# Patient Record
Sex: FEMALE | Race: WHITE | NOT HISPANIC OR LATINO | Employment: OTHER | URBAN - METROPOLITAN AREA
[De-identification: names, ages, dates, MRNs, and addresses within clinical notes are randomized per-mention and may not be internally consistent; named-entity substitution may affect disease eponyms.]

---

## 2017-01-10 ENCOUNTER — TELEPHONE (OUTPATIENT)
Dept: INTERNAL MEDICINE | Facility: CLINIC | Age: 57
End: 2017-01-10

## 2017-01-10 NOTE — TELEPHONE ENCOUNTER
----- Message from Mee Sidhu sent at 1/10/2017 10:28 AM EST -----  Patient called to ask for an order to check her thyroid, please. Thank you!

## 2017-01-10 NOTE — TELEPHONE ENCOUNTER
Called to see why she needed Thyroid checked again just checked on 12.20.16.      Her endo doctor wants her to have:  Free T3  Reverse T3  Thyroid Antibody tests  T3U  Free T4 Index?  TPO and anti TG antibodies      Weight gain, hair lose etc.

## 2017-01-12 DIAGNOSIS — R63.5 WEIGHT GAIN: ICD-10-CM

## 2017-01-12 DIAGNOSIS — L65.9 HAIR LOSS: Primary | ICD-10-CM

## 2017-01-13 ENCOUNTER — LAB (OUTPATIENT)
Dept: INTERNAL MEDICINE | Facility: CLINIC | Age: 57
End: 2017-01-13

## 2017-01-13 DIAGNOSIS — R77.8 ELEVATED TOTAL PROTEIN: ICD-10-CM

## 2017-01-13 DIAGNOSIS — L65.9 HAIR LOSS: ICD-10-CM

## 2017-01-13 DIAGNOSIS — E83.52 HYPERCALCEMIA: ICD-10-CM

## 2017-01-13 DIAGNOSIS — D72.829 LEUKOCYTOSIS, UNSPECIFIED TYPE: ICD-10-CM

## 2017-01-13 DIAGNOSIS — R63.5 WEIGHT GAIN: ICD-10-CM

## 2017-01-13 LAB
BASOPHILS # BLD AUTO: 0.03 10*3/MM3 (ref 0–0.2)
BASOPHILS NFR BLD AUTO: 0.2 % (ref 0–1)
CA-I SERPL ISE-MCNC: 1.34 MMOL/L (ref 1.12–1.32)
DEPRECATED RDW RBC AUTO: 45.6 FL (ref 37–54)
EOSINOPHIL # BLD AUTO: 0.85 10*3/MM3 (ref 0.1–0.3)
EOSINOPHIL NFR BLD AUTO: 6.7 % (ref 0–3)
ERYTHROCYTE [DISTWIDTH] IN BLOOD BY AUTOMATED COUNT: 13.7 % (ref 11.3–14.5)
HCT VFR BLD AUTO: 39.5 % (ref 34.5–44)
HGB BLD-MCNC: 13 G/DL (ref 11.5–15.5)
IMM GRANULOCYTES # BLD: 0.02 10*3/MM3 (ref 0–0.03)
IMM GRANULOCYTES NFR BLD: 0.2 % (ref 0–0.6)
LYMPHOCYTES # BLD AUTO: 4.04 10*3/MM3 (ref 0.6–4.8)
LYMPHOCYTES NFR BLD AUTO: 32 % (ref 24–44)
MCH RBC QN AUTO: 29.8 PG (ref 27–31)
MCHC RBC AUTO-ENTMCNC: 32.9 G/DL (ref 32–36)
MCV RBC AUTO: 90.6 FL (ref 80–99)
MONOCYTES # BLD AUTO: 1.87 10*3/MM3 (ref 0–1)
MONOCYTES NFR BLD AUTO: 14.8 % (ref 0–12)
NEUTROPHILS # BLD AUTO: 5.83 10*3/MM3 (ref 1.5–8.3)
NEUTROPHILS NFR BLD AUTO: 46.1 % (ref 41–71)
PLATELET # BLD AUTO: 517 10*3/MM3 (ref 150–450)
PMV BLD AUTO: 11.4 FL (ref 6–12)
RBC # BLD AUTO: 4.36 10*6/MM3 (ref 3.89–5.14)
T4 FREE SERPL-MCNC: 1.01 NG/DL (ref 0.89–1.76)
TSH SERPL DL<=0.05 MIU/L-ACNC: 2.25 MIU/ML (ref 0.35–5.35)
WBC NRBC COR # BLD: 12.64 10*3/MM3 (ref 3.5–10.8)

## 2017-01-13 PROCEDURE — 86376 MICROSOMAL ANTIBODY EACH: CPT | Performed by: FAMILY MEDICINE

## 2017-01-13 PROCEDURE — 84439 ASSAY OF FREE THYROXINE: CPT | Performed by: FAMILY MEDICINE

## 2017-01-13 PROCEDURE — 85025 COMPLETE CBC W/AUTO DIFF WBC: CPT | Performed by: FAMILY MEDICINE

## 2017-01-13 PROCEDURE — 84432 ASSAY OF THYROGLOBULIN: CPT | Performed by: FAMILY MEDICINE

## 2017-01-13 PROCEDURE — 84155 ASSAY OF PROTEIN SERUM: CPT | Performed by: FAMILY MEDICINE

## 2017-01-13 PROCEDURE — 84479 ASSAY OF THYROID (T3 OR T4): CPT | Performed by: FAMILY MEDICINE

## 2017-01-13 PROCEDURE — 84165 PROTEIN E-PHORESIS SERUM: CPT | Performed by: FAMILY MEDICINE

## 2017-01-13 PROCEDURE — 83970 ASSAY OF PARATHORMONE: CPT | Performed by: FAMILY MEDICINE

## 2017-01-13 PROCEDURE — 84443 ASSAY THYROID STIM HORMONE: CPT | Performed by: FAMILY MEDICINE

## 2017-01-13 PROCEDURE — 84481 FREE ASSAY (FT-3): CPT | Performed by: FAMILY MEDICINE

## 2017-01-13 PROCEDURE — 36415 COLL VENOUS BLD VENIPUNCTURE: CPT

## 2017-01-13 PROCEDURE — 84482 T3 REVERSE: CPT | Performed by: FAMILY MEDICINE

## 2017-01-13 PROCEDURE — 82330 ASSAY OF CALCIUM: CPT | Performed by: FAMILY MEDICINE

## 2017-01-14 LAB
FT4I SERPL CALC-MCNC: 2.1 (ref 1.2–4.9)
PTH-INTACT SERPL-MCNC: 36 PG/ML (ref 15–65)
T3FREE SERPL-MCNC: 2.8 PG/ML (ref 2–4.4)
T3RU NFR SERPL: 30 % (ref 24–39)
T4 SERPL-MCNC: 7 UG/DL (ref 4.5–12)
THYROPEROXIDASE AB SERPL-ACNC: 23 IU/ML (ref 0–34)

## 2017-01-16 LAB
ALBUMIN SERPL-MCNC: 3.9 G/DL (ref 2.9–4.4)
ALBUMIN/GLOB SERPL: 1 {RATIO} (ref 0.7–1.7)
ALPHA1 GLOB FLD ELPH-MCNC: 0.3 G/DL (ref 0–0.4)
ALPHA2 GLOB SERPL ELPH-MCNC: 1 G/DL (ref 0.4–1)
B-GLOBULIN SERPL ELPH-MCNC: 1.3 G/DL (ref 0.7–1.3)
GAMMA GLOB SERPL ELPH-MCNC: 1.2 G/DL (ref 0.4–1.8)
GLOBULIN SER CALC-MCNC: 3.8 G/DL (ref 2.2–3.9)
Lab: NORMAL
M-SPIKE: NORMAL G/DL
PROT SERPL-MCNC: 7.7 G/DL (ref 6–8.5)

## 2017-01-17 LAB — T3REVERSE SERPL-MCNC: 10.9 NG/DL (ref 9.2–24.1)

## 2017-01-18 ENCOUNTER — TELEPHONE (OUTPATIENT)
Dept: INTERNAL MEDICINE | Facility: CLINIC | Age: 57
End: 2017-01-18

## 2017-01-18 NOTE — TELEPHONE ENCOUNTER
----- Message from Mee Sidhu sent at 1/18/2017  3:09 PM EST -----  Patient called stating that the symptoms from her bladder infection have not improved. Please advise. Thank you!

## 2017-01-20 ENCOUNTER — OFFICE VISIT (OUTPATIENT)
Dept: INTERNAL MEDICINE | Facility: CLINIC | Age: 57
End: 2017-01-20

## 2017-01-20 VITALS
WEIGHT: 141.8 LBS | SYSTOLIC BLOOD PRESSURE: 110 MMHG | DIASTOLIC BLOOD PRESSURE: 70 MMHG | TEMPERATURE: 98 F | OXYGEN SATURATION: 94 % | BODY MASS INDEX: 23.63 KG/M2 | HEART RATE: 81 BPM | HEIGHT: 65 IN

## 2017-01-20 DIAGNOSIS — L65.9 HAIR LOSS: ICD-10-CM

## 2017-01-20 DIAGNOSIS — R82.90 ABNORMAL URINALYSIS: ICD-10-CM

## 2017-01-20 DIAGNOSIS — R82.90 ABNORMAL URINE ODOR: Primary | ICD-10-CM

## 2017-01-20 LAB
BILIRUB BLD-MCNC: NEGATIVE MG/DL
CLARITY, POC: CLEAR
COLOR UR: YELLOW
GLUCOSE UR STRIP-MCNC: NEGATIVE MG/DL
KETONES UR QL: NEGATIVE
LEUKOCYTE EST, POC: ABNORMAL
NITRITE UR-MCNC: NEGATIVE MG/ML
PH UR: 6 [PH] (ref 5–8)
PROT UR STRIP-MCNC: NEGATIVE MG/DL
RBC # UR STRIP: NEGATIVE /UL
SP GR UR: 1.02 (ref 1–1.03)
UROBILINOGEN UR QL: ABNORMAL

## 2017-01-20 PROCEDURE — 87077 CULTURE AEROBIC IDENTIFY: CPT | Performed by: FAMILY MEDICINE

## 2017-01-20 PROCEDURE — 87186 SC STD MICRODIL/AGAR DIL: CPT | Performed by: FAMILY MEDICINE

## 2017-01-20 PROCEDURE — 87086 URINE CULTURE/COLONY COUNT: CPT | Performed by: FAMILY MEDICINE

## 2017-01-20 PROCEDURE — 99213 OFFICE O/P EST LOW 20 MIN: CPT | Performed by: FAMILY MEDICINE

## 2017-01-20 PROCEDURE — 81003 URINALYSIS AUTO W/O SCOPE: CPT | Performed by: FAMILY MEDICINE

## 2017-01-20 RX ORDER — CEFUROXIME AXETIL 500 MG/1
500 TABLET ORAL 2 TIMES DAILY
Qty: 20 TABLET | Refills: 0 | Status: SHIPPED | OUTPATIENT
Start: 2017-01-20 | End: 2017-05-11

## 2017-01-20 NOTE — PROGRESS NOTES
"Leni Laurent is a 56 y.o. female.     Chief Complaint   Patient presents with   • Cystitis     1 month follow up on bladder infection and thyroid       Vitals:    01/20/17 1634   BP: 110/70   Pulse: 81   Temp: 98 °F (36.7 °C)   SpO2: 94%   Weight: 141 lb 12.8 oz (64.3 kg)   Height: 65\" (165.1 cm)       Cystitis   This is a recurrent problem. The current episode started 1 to 4 weeks ago. The problem occurs intermittently. The problem has been unchanged. Associated symptoms include abdominal pain, diaphoresis, fatigue, headaches, nausea and vomiting. Pertinent negatives include no arthralgias, chest pain, chills, coughing, fever, myalgias, neck pain, rash or sore throat.      Bladder infection has recurred. Pt has an odor, but no pain.   Pt has had weight gain and fatigue with brittle nails and hair loss.  Preliminary thyroid lab ok, will check Celiac.   The following portions of the patient's history were reviewed and updated as appropriate: allergies, current medications, past family history, past medical history, past social history, past surgical history and problem list.     Past Medical History   Diagnosis Date   • Abdominal pain    • Absence of pancreas, acquired    • Anxiety    • Asthma    • Chronic illness    • Contact dermatitis      due to plants   • Depression    • Diabetes mellitus    • Encounter for dental examination 07/2014   • Gastroparesis    • GERD (gastroesophageal reflux disease)    • Hyperlipidemia    • Incisional hernia    • Insomnia    • Iron deficiency    • Myeloid leukemia    • Osteopenia    • Post-splenectomy    • Vitamin D deficiency        Past Surgical History   Procedure Laterality Date   • Colonoscopy     • Total abdominal hysterectomy with salpingo oophorectomy     • Pancreas surgery       pancreas removal   • Gallbladder surgery     • Stomach surgery       distal stomach    • Other surgical history       duodeum removed   • Other surgical history       jejunem removed "   • Cervical discectomy     • Hernia mesh removal  2014     abdominal hernia with mesh        Allergies   Allergen Reactions   • Coconut Oil      Difficulty speaking, swallowing, hives     • Aspirin Hives   • Phenergan [Promethazine Hcl]      Anxiety/relessness   • Reglan [Metoclopramide]      Involuntary movement       Social History     Social History   • Marital status:      Spouse name: N/A   • Number of children: N/A   • Years of education: N/A     Occupational History   • Not on file.     Social History Main Topics   • Smoking status: Never Smoker   • Smokeless tobacco: Never Used   • Alcohol use No   • Drug use: No   • Sexual activity: Yes     Birth control/ protection: None     Other Topics Concern   • Not on file     Social History Narrative       Family History   Problem Relation Age of Onset   • Osteoporosis Mother    • Cancer Mother      small bowel cancer   • Hashimoto's thyroiditis Mother    • Lung cancer Father          Review of Systems   Constitutional: Positive for diaphoresis and fatigue. Negative for chills and fever.   HENT: Negative for ear pain, hearing loss, mouth sores, nosebleeds, postnasal drip, rhinorrhea, sinus pressure, sneezing and sore throat.    Eyes: Negative for redness and itching.   Respiratory: Negative for cough, shortness of breath and wheezing.    Cardiovascular: Positive for palpitations. Negative for chest pain.   Gastrointestinal: Positive for abdominal pain, constipation, diarrhea, nausea and vomiting. Negative for anal bleeding and blood in stool.        Hx pancreas issues   Endocrine: Positive for heat intolerance. Negative for cold intolerance, polydipsia, polyphagia and polyuria.   Genitourinary: Positive for frequency and hematuria. Negative for dysuria and urgency.   Musculoskeletal: Negative for arthralgias, back pain, gait problem, myalgias and neck pain.   Skin: Negative for color change and rash.        Dry skin, and brittle nails   Allergic/Immunologic:  Negative for environmental allergies.   Neurological: Positive for headaches. Negative for dizziness, seizures and syncope.   Hematological: Negative for adenopathy. Does not bruise/bleed easily.   Psychiatric/Behavioral: Negative for dysphoric mood. The patient is not nervous/anxious.        Objective   Physical Exam   Constitutional: She is oriented to person, place, and time. She appears well-developed.   HENT:   Head: Normocephalic.   Right Ear: External ear normal.   Left Ear: External ear normal.   Nose: Nose normal.   Mouth/Throat: Oropharynx is clear and moist.   Eyes: Conjunctivae and EOM are normal. Pupils are equal, round, and reactive to light.   Neck: Normal range of motion. Neck supple.   Cardiovascular: Normal rate and regular rhythm.    No murmur heard.  Pulmonary/Chest: Effort normal and breath sounds normal.   Abdominal: Soft. Bowel sounds are normal. There is tenderness. There is no rebound and no guarding.   Musculoskeletal: Normal range of motion.   Neurological: She is alert and oriented to person, place, and time.   Skin: Skin is warm and dry.   Psychiatric: She has a normal mood and affect. Her behavior is normal.   Nursing note and vitals reviewed.      Assessment/Plan   Renée was seen today for cystitis.    Diagnoses and all orders for this visit:    Abnormal urine odor  -     POC Urinalysis Dipstick, Automated    Abnormal urinalysis  -     Urine Culture    Hair loss  -     Celiac Disease Panel    Other orders  -     cefuroxime (CEFTIN) 500 MG tablet; Take 1 tablet by mouth 2 (Two) Times a Day.      Reviewed available lab with pt and wrote quick lab letter in her presence to be able to give her a copy of most recent lab.              Current Outpatient Prescriptions:   •  desvenlafaxine (PRISTIQ) 100 MG 24 hr tablet, Take 1 tablet by mouth Daily., Disp: 30 tablet, Rfl: 5  •  esomeprazole (nexIUM) 40 MG capsule, Take 1 capsule by mouth Every Morning Before Breakfast., Disp: 30 capsule, Rfl:  5  •  glucagon (GLUCAGON EMERGENCY) 1 MG injection, Inject 1 mg under the skin 1 (one) time as needed for low blood sugar for up to 1 dose., Disp: 1 kit, Rfl: 12  •  glucose blood test strip, Used to test blood sugar 4 times daily for diabetes. E10.9, Disp: 150 each, Rfl: 12  •  Insulin Glargine (LANTUS SOLOSTAR) 100 UNIT/ML injection pen, Inject 10 Units under the skin Every Night. For diabetes E10.9, Disp: 5 pen, Rfl: 1  •  Insulin Lispro (HUMALOG KWIKPEN) 100 UNIT/ML solution pen-injector, 3 times per day, sliding scale. 1 unit for every 30 points over 175 for diabetes. E10.9, Disp: 10 pen, Rfl: 1  •  magnesium oxide (MAGOX) 400 (241.3 MG) MG tablet tablet, Take 400 mg by mouth daily., Disp: , Rfl:   •  Multiple Vitamins-Minerals (AQUADEKS PO), Take  by mouth., Disp: , Rfl:   •  ondansetron (ZOFRAN) 4 MG tablet, Take 1 tablet by mouth Every 8 (Eight) Hours As Needed for nausea or vomiting., Disp: 30 tablet, Rfl: 5  •  pancrelipase, Lip-Prot-Amyl, (CREON) 40574 UNITS capsule delayed-release particles capsule, Take 3 tablets tid with meals and 1-2 with snacks, Disp: 360 capsule, Rfl: 5  •  vitamin D (ERGOCALCIFEROL) 51537 UNITS capsule capsule, Take 1 capsule by mouth Every 7 (Seven) Days., Disp: 4 capsule, Rfl: 4  •  zolpidem (AMBIEN) 10 MG tablet, Take 1 tablet by mouth At Night As Needed for sleep., Disp: 30 tablet, Rfl: 2  •  cefuroxime (CEFTIN) 500 MG tablet, Take 1 tablet by mouth 2 (Two) Times a Day., Disp: 20 tablet, Rfl: 0  •  HYDROcodone-acetaminophen (NORCO) 5-325 MG per tablet, Take 1 tablet by mouth Every 6 (Six) Hours As Needed for moderate pain (4-6)., Disp: 30 tablet, Rfl: 0  •  Multiple Vitamins-Minerals (MULTIVITAMIN ADULT PO), Take  by mouth., Disp: , Rfl:     Return in about 3 months (around 4/20/2017), or if symptoms worsen or fail to improve, for Recheck.    Recent Results (from the past 336 hour(s))   PTH, Intact    Collection Time: 01/13/17  2:08 PM   Result Value Ref Range    PTH, Intact 36  15 - 65 pg/mL   Calcium, Ionized    Collection Time: 01/13/17  2:08 PM   Result Value Ref Range    Ionized Calcium 1.34 (H) 1.12 - 1.32 mmol/L   Protein Electrophoresis, Total    Collection Time: 01/13/17  2:08 PM   Result Value Ref Range    Total Protein 7.7 6.0 - 8.5 g/dL    Albumin 3.9 2.9 - 4.4 g/dL    Alpha-1-Globulin 0.3 0.0 - 0.4 g/dL    Alpha-2-Globulin 1.0 0.4 - 1.0 g/dL    Beta Globulin 1.3 0.7 - 1.3 g/dL    Gamma Globulin 1.2 0.4 - 1.8 g/dL    M-Clive Not Observed Not Observed g/dL    Globulin 3.8 2.2 - 3.9 g/dL    A/G Ratio 1.0 0.7 - 1.7    Please note Comment    TSH    Collection Time: 01/13/17  2:08 PM   Result Value Ref Range    TSH 2.247 0.350 - 5.350 mIU/mL   T4, Free    Collection Time: 01/13/17  2:08 PM   Result Value Ref Range    Free T4 1.01 0.89 - 1.76 ng/dL   T3, Free    Collection Time: 01/13/17  2:08 PM   Result Value Ref Range    T3, Free 2.8 2.0 - 4.4 pg/mL   T3, Reverse    Collection Time: 01/13/17  2:08 PM   Result Value Ref Range    T3, Reverse 10.9 9.2 - 24.1 ng/dL   Thyroid Peroxidase Antibody    Collection Time: 01/13/17  2:08 PM   Result Value Ref Range    Thyroid Peroxidase Antibody 23 0 - 34 IU/mL   T3 Uptake & FTI    Collection Time: 01/13/17  2:08 PM   Result Value Ref Range    T4, Total 7.0 4.5 - 12.0 ug/dL    T3 Uptake 30 24 - 39 %    Free Thyroxine Index 2.1 1.2 - 4.9   CBC Auto Differential    Collection Time: 01/13/17  2:08 PM   Result Value Ref Range    WBC 12.64 (H) 3.50 - 10.80 10*3/mm3    RBC 4.36 3.89 - 5.14 10*6/mm3    Hemoglobin 13.0 11.5 - 15.5 g/dL    Hematocrit 39.5 34.5 - 44.0 %    MCV 90.6 80.0 - 99.0 fL    MCH 29.8 27.0 - 31.0 pg    MCHC 32.9 32.0 - 36.0 g/dL    RDW 13.7 11.3 - 14.5 %    RDW-SD 45.6 37.0 - 54.0 fl    MPV 11.4 6.0 - 12.0 fL    Platelets 517 (H) 150 - 450 10*3/mm3    Neutrophil % 46.1 41.0 - 71.0 %    Lymphocyte % 32.0 24.0 - 44.0 %    Monocyte % 14.8 (H) 0.0 - 12.0 %    Eosinophil % 6.7 (H) 0.0 - 3.0 %    Basophil % 0.2 0.0 - 1.0 %    Immature  Grans % 0.2 0.0 - 0.6 %    Neutrophils, Absolute 5.83 1.50 - 8.30 10*3/mm3    Lymphocytes, Absolute 4.04 0.60 - 4.80 10*3/mm3    Monocytes, Absolute 1.87 (H) 0.00 - 1.00 10*3/mm3    Eosinophils, Absolute 0.85 (H) 0.10 - 0.30 10*3/mm3    Basophils, Absolute 0.03 0.00 - 0.20 10*3/mm3    Immature Grans, Absolute 0.02 0.00 - 0.03 10*3/mm3   POC Urinalysis Dipstick, Automated    Collection Time: 01/20/17  5:24 PM   Result Value Ref Range    Color Yellow Yellow, Straw, Dark Yellow, Denice    Clarity, UA Clear Clear    Glucose, UA Negative Negative, 1000 mg/dL (3+) mg/dL    Bilirubin Negative Negative    Ketones, UA Negative Negative    Specific Gravity  1.025 1.005 - 1.030    Blood, UA Negative Negative    pH, Urine 6.0 5.0 - 8.0    Protein, POC Negative Negative mg/dL    Urobilinogen, UA  Normal    Leukocytes Moderate (2+) (A) Negative    Nitrite, UA Negative Negative

## 2017-01-20 NOTE — MR AVS SNAPSHOT
Renée Laurent   1/20/2017 3:30 PM   Office Visit    Dept Phone:  [phone]   Encounter #:  [id number]    Provider:  Karla EDDY MD   Department:  [facility] INTERNAL MEDICINE                Your Full Care Plan              Today's Medication Changes          These changes are accurate as of: 1/20/17  5:48 PM.  If you have any questions, ask your nurse or doctor.               New Medication(s)Ordered:     cefuroxime 500 MG tablet   Commonly known as:  CEFTIN   Take 1 tablet by mouth 2 (Two) Times a Day.   Started by:  Karla EDDY MD         Stop taking medication(s)listed here:     nitrofurantoin (macrocrystal-monohydrate) 100 MG capsule   Commonly known as:  MACROBID   Stopped by:  Karla EDDY MD                Where to Get Your Medications      These medications were sent to [address] [place], [place] - [address] - [phone]  - [phone] FX  [address] [facility], [place] [address]     Phone:  [phone]     cefuroxime 500 MG tablet                  Your Updated Medication List          This list is accurate as of: 1/20/17  5:48 PM.  Always use your most recent med list.                AQUADEKS PO       cefuroxime 500 MG tablet   Commonly known as:  CEFTIN   Take 1 tablet by mouth 2 (Two) Times a Day.       desvenlafaxine 100 MG 24 hr tablet   Commonly known as:  PRISTIQ   Take 1 tablet by mouth Daily.       esomeprazole 40 MG capsule   Commonly known as:  nexIUM   Take 1 capsule by mouth Every Morning Before Breakfast.       glucagon 1 MG injection   Commonly known as:  GLUCAGON EMERGENCY   Inject 1 mg under the skin 1 (one) time as needed for low blood sugar for up to 1 dose.       glucose blood test strip   Used to test blood sugar 4 times daily for diabetes. E10.9       HYDROcodone-acetaminophen 5-325 MG per tablet   Commonly known as:  NORCO   Take 1 tablet by mouth Every 6 (Six) Hours As Needed for moderate pain  (4-6).       Insulin Glargine 100 UNIT/ML injection pen   Commonly known as:  LANTUS SOLOSTAR   Inject 10 Units under the skin Every Night. For diabetes E10.9       Insulin Lispro 100 UNIT/ML solution pen-injector   Commonly known as:  HUMALOG KWIKPEN   3 times per day, sliding scale. 1 unit for every 30 points over 175 for diabetes. E10.9       magnesium oxide 400 (241.3 MG) MG tablet tablet   Commonly known as:  MAGOX       MULTIVITAMIN ADULT PO       ondansetron 4 MG tablet   Commonly known as:  ZOFRAN   Take 1 tablet by mouth Every 8 (Eight) Hours As Needed for nausea or vomiting.       pancrelipase (Lip-Prot-Amyl) 96008 UNITS capsule delayed-release particles capsule   Commonly known as:  CREON   Take 3 tablets tid with meals and 1-2 with snacks       vitamin D 14554 UNITS capsule capsule   Commonly known as:  ERGOCALCIFEROL   Take 1 capsule by mouth Every 7 (Seven) Days.       zolpidem 10 MG tablet   Commonly known as:  AMBIEN   Take 1 tablet by mouth At Night As Needed for sleep.               We Performed the Following     Celiac Disease Panel     POC Urinalysis Dipstick, Automated     Urine Culture       You Were Diagnosed With        Codes Comments    Abnormal urine odor    -  Primary ICD-10-CM: R82.90  ICD-9-CM: 791.9     Abnormal urinalysis     ICD-10-CM: R82.90  ICD-9-CM: 791.9     Hair loss     ICD-10-CM: L65.9  ICD-9-CM: 704.00       Instructions     None    Patient Instructions History      Upcoming Appointments     Visit Type Date Time Department    FOLLOW UP 1/20/2017  3:30 PM [address]    FOLLOW UP 3/20/2017 12:30 PM [address]      MyChart Signup     Our records indicate that you have declined [facility] Schedule C Systemshart signup. If you would like to sign up for [employer], please email [email or web address] or call [phone] to obtain an activation code.             Other Info from Your Visit           Your Appointments     Mar 20, 2017 12:30 PM EDT   Follow Up with Karla  "Brian EDDY MD   [facility] INTERNAL MEDICINE (--)    [address]  [place] [id number] [phone]           Arrive 15 minutes prior to appointment.              Allergies     Coconut Oil      Difficulty speaking, swallowing, hives    Aspirin  Hives    Phenergan [Promethazine Hcl]      Anxiety/relessness    Reglan [Metoclopramide]      Involuntary movement      Reason for Visit     Cystitis 1 month follow up on bladder infection and thyroid      Vital Signs     Blood Pressure Pulse Temperature Height Weight Last Menstrual Period    110/70 81 98 °F (36.7 °C) 65\" (165.1 cm) 141 lb 12.8 oz (64.3 kg) (LMP Unknown)    Oxygen Saturation Body Mass Index Smoking Status             94% 23.6 kg/m2 Never Smoker         Problems and Diagnoses Noted     Abnormal urine odor    -  Primary    Abnormal urine findings        Hair loss          Results     POC Urinalysis Dipstick, Automated      Component Value Standard Range & Units    Color Yellow Yellow, Straw, Dark Yellow, Denice    Clarity, UA Clear Clear    Glucose, UA Negative Negative, 1000 mg/dL (3+) mg/dL    Bilirubin Negative Negative    Ketones, UA Negative Negative    Specific Gravity  1.025 1.005 - 1.030    Blood, UA Negative Negative    pH, Urine 6.0 5.0 - 8.0    Protein, POC Negative Negative mg/dL    Urobilinogen, UA  Normal    17 umol/L    Leukocytes Moderate (2+) Negative    125 Ray/uL    Nitrite, UA Negative Negative                    "

## 2017-01-22 LAB — BACTERIA SPEC AEROBE CULT: ABNORMAL

## 2017-01-23 PROCEDURE — 36415 COLL VENOUS BLD VENIPUNCTURE: CPT | Performed by: FAMILY MEDICINE

## 2017-01-23 PROCEDURE — 86255 FLUORESCENT ANTIBODY SCREEN: CPT | Performed by: FAMILY MEDICINE

## 2017-01-25 LAB
ENDOMYSIUM IGA SER QL: NEGATIVE
IGA SERPL-MCNC: 435 MG/DL (ref 87–352)
TTG IGA SER-ACNC: <2 U/ML (ref 0–3)

## 2017-01-30 LAB — THYROGLOBULIN (TG-RIA): 7.8 NG/ML

## 2017-03-22 RX ORDER — ZOLPIDEM TARTRATE 10 MG/1
10 TABLET ORAL NIGHTLY PRN
Qty: 30 TABLET | Refills: 0 | OUTPATIENT
Start: 2017-03-22 | End: 2017-05-11 | Stop reason: SDUPTHER

## 2017-03-23 ENCOUNTER — TELEPHONE (OUTPATIENT)
Dept: INTERNAL MEDICINE | Facility: CLINIC | Age: 57
End: 2017-03-23

## 2017-03-23 NOTE — TELEPHONE ENCOUNTER
----- Message from Karla EDDY MD sent at 3/22/2017  5:58 PM EDT -----  Ok #30 R0 on printer  ----- Message -----     From: Nuha Butt     Sent: 3/22/2017   5:00 PM       To: Karla EDDY MD    Last seen 1/20/17.  Last fill2/21/17.  Next appointment 3/30.  Mannie printed.  [facility] 12/22/16  ----- Message -----     From: Angely Baron     Sent: 3/22/2017   9:13 AM       To: Nuha Butt    PT CALLED NEEDS REFILL ZOLPIDIM 10 MG SENT TO [facility] [place]

## 2017-04-17 RX ORDER — ZOLPIDEM TARTRATE 10 MG/1
TABLET ORAL
Qty: 30 TABLET | Refills: 0 | OUTPATIENT
Start: 2017-04-17

## 2017-04-19 ENCOUNTER — TELEPHONE (OUTPATIENT)
Dept: INTERNAL MEDICINE | Facility: CLINIC | Age: 57
End: 2017-04-19

## 2017-04-19 RX ORDER — ZOLPIDEM TARTRATE 10 MG/1
TABLET ORAL
Qty: 30 TABLET | Refills: 0 | OUTPATIENT
Start: 2017-04-19

## 2017-04-19 NOTE — TELEPHONE ENCOUNTER
----- Message from Angely Baron sent at 4/18/2017  1:16 PM EDT -----  PT NEEDS REFILL ZOLPIDIM 10 SENT TO [facility] [place]

## 2017-04-19 NOTE — TELEPHONE ENCOUNTER
PN that she must be seen for 3 mon th follow up before we can approve.  She has an appointment on 5/4, I offered an earlier appointment, but she will be out of town. She will wait for her appointment

## 2017-05-11 ENCOUNTER — OFFICE VISIT (OUTPATIENT)
Dept: INTERNAL MEDICINE | Facility: CLINIC | Age: 57
End: 2017-05-11

## 2017-05-11 VITALS
TEMPERATURE: 97.5 F | WEIGHT: 141.4 LBS | HEIGHT: 65 IN | BODY MASS INDEX: 23.56 KG/M2 | HEART RATE: 78 BPM | OXYGEN SATURATION: 94 % | SYSTOLIC BLOOD PRESSURE: 110 MMHG | DIASTOLIC BLOOD PRESSURE: 72 MMHG

## 2017-05-11 DIAGNOSIS — K31.84 GASTROPARESIS: ICD-10-CM

## 2017-05-11 DIAGNOSIS — Z90.410 POST-PANCREATECTOMY DIABETES (HCC): Primary | ICD-10-CM

## 2017-05-11 DIAGNOSIS — K21.9 GASTROESOPHAGEAL REFLUX DISEASE WITHOUT ESOPHAGITIS: ICD-10-CM

## 2017-05-11 DIAGNOSIS — R53.83 OTHER FATIGUE: ICD-10-CM

## 2017-05-11 DIAGNOSIS — E89.1 POST-PANCREATECTOMY DIABETES (HCC): Primary | ICD-10-CM

## 2017-05-11 DIAGNOSIS — K91.2 POSTSURGICAL MALABSORPTION: ICD-10-CM

## 2017-05-11 DIAGNOSIS — E10.9 TYPE 1 DIABETES MELLITUS WITHOUT COMPLICATIONS (HCC): ICD-10-CM

## 2017-05-11 DIAGNOSIS — E13.9 POST-PANCREATECTOMY DIABETES (HCC): Primary | ICD-10-CM

## 2017-05-11 DIAGNOSIS — E78.2 MIXED HYPERLIPIDEMIA: ICD-10-CM

## 2017-05-11 LAB
ALBUMIN SERPL-MCNC: 4.9 G/DL (ref 3.2–4.8)
ALBUMIN/GLOB SERPL: 1.3 G/DL (ref 1.5–2.5)
ALP SERPL-CCNC: 183 U/L (ref 25–100)
ALT SERPL W P-5'-P-CCNC: 34 U/L (ref 7–40)
ANION GAP SERPL CALCULATED.3IONS-SCNC: 4 MMOL/L (ref 3–11)
ARTICHOKE IGE QN: 150 MG/DL (ref 0–130)
AST SERPL-CCNC: 33 U/L (ref 0–33)
BILIRUB SERPL-MCNC: 0.2 MG/DL (ref 0.3–1.2)
BUN BLD-MCNC: 15 MG/DL (ref 9–23)
BUN/CREAT SERPL: 21.4 (ref 7–25)
CALCIUM SPEC-SCNC: 10.6 MG/DL (ref 8.7–10.4)
CHLORIDE SERPL-SCNC: 99 MMOL/L (ref 99–109)
CHOLEST SERPL-MCNC: 259 MG/DL (ref 0–200)
CO2 SERPL-SCNC: 36 MMOL/L (ref 20–31)
CREAT BLD-MCNC: 0.7 MG/DL (ref 0.6–1.3)
GFR SERPL CREATININE-BSD FRML MDRD: 86 ML/MIN/1.73
GLOBULIN UR ELPH-MCNC: 3.8 GM/DL
GLUCOSE BLD-MCNC: 116 MG/DL (ref 70–100)
HBA1C MFR BLD: 6.3 % (ref 4.8–5.6)
HDLC SERPL-MCNC: 72 MG/DL (ref 40–60)
POTASSIUM BLD-SCNC: 4.6 MMOL/L (ref 3.5–5.5)
PROT SERPL-MCNC: 8.7 G/DL (ref 5.7–8.2)
SODIUM BLD-SCNC: 139 MMOL/L (ref 132–146)
TRIGL SERPL-MCNC: 273 MG/DL (ref 0–150)
TSH SERPL DL<=0.05 MIU/L-ACNC: 2.36 MIU/ML (ref 0.35–5.35)

## 2017-05-11 PROCEDURE — 80053 COMPREHEN METABOLIC PANEL: CPT | Performed by: FAMILY MEDICINE

## 2017-05-11 PROCEDURE — 83036 HEMOGLOBIN GLYCOSYLATED A1C: CPT | Performed by: FAMILY MEDICINE

## 2017-05-11 PROCEDURE — 80061 LIPID PANEL: CPT | Performed by: FAMILY MEDICINE

## 2017-05-11 PROCEDURE — 99214 OFFICE O/P EST MOD 30 MIN: CPT | Performed by: FAMILY MEDICINE

## 2017-05-11 PROCEDURE — 84443 ASSAY THYROID STIM HORMONE: CPT | Performed by: FAMILY MEDICINE

## 2017-05-11 RX ORDER — ZOLPIDEM TARTRATE 10 MG/1
10 TABLET ORAL NIGHTLY PRN
Qty: 30 TABLET | Refills: 2 | Status: SHIPPED | OUTPATIENT
Start: 2017-05-11 | End: 2017-08-28 | Stop reason: SDUPTHER

## 2017-05-11 RX ORDER — ONDANSETRON 4 MG/1
4 TABLET, FILM COATED ORAL EVERY 8 HOURS PRN
Qty: 60 TABLET | Refills: 5 | Status: SHIPPED | OUTPATIENT
Start: 2017-05-11 | End: 2017-10-27 | Stop reason: SDUPTHER

## 2017-05-14 DIAGNOSIS — E55.9 VITAMIN D DEFICIENCY: ICD-10-CM

## 2017-05-15 RX ORDER — ERGOCALCIFEROL 1.25 MG/1
CAPSULE ORAL
Qty: 4 CAPSULE | Refills: 0 | Status: SHIPPED | OUTPATIENT
Start: 2017-05-15 | End: 2017-06-15 | Stop reason: SDUPTHER

## 2017-06-15 DIAGNOSIS — E55.9 VITAMIN D DEFICIENCY: ICD-10-CM

## 2017-06-15 RX ORDER — ERGOCALCIFEROL 1.25 MG/1
CAPSULE ORAL
Qty: 4 CAPSULE | Refills: 0 | Status: SHIPPED | OUTPATIENT
Start: 2017-06-15 | End: 2023-01-16

## 2017-06-17 DIAGNOSIS — F32.A DEPRESSION, UNSPECIFIED DEPRESSION TYPE: ICD-10-CM

## 2017-06-19 RX ORDER — DESVENLAFAXINE 100 MG/1
TABLET, EXTENDED RELEASE ORAL
Qty: 30 TABLET | Refills: 4 | Status: SHIPPED | OUTPATIENT
Start: 2017-06-19 | End: 2017-11-20 | Stop reason: SDUPTHER

## 2017-08-21 ENCOUNTER — OFFICE VISIT (OUTPATIENT)
Dept: GASTROENTEROLOGY | Facility: CLINIC | Age: 57
End: 2017-08-21

## 2017-08-21 VITALS
WEIGHT: 148 LBS | HEIGHT: 65 IN | TEMPERATURE: 97.5 F | HEART RATE: 92 BPM | DIASTOLIC BLOOD PRESSURE: 68 MMHG | BODY MASS INDEX: 24.66 KG/M2 | OXYGEN SATURATION: 98 % | RESPIRATION RATE: 20 BRPM | SYSTOLIC BLOOD PRESSURE: 108 MMHG

## 2017-08-21 DIAGNOSIS — K63.89 SMALL INTESTINAL BACTERIAL OVERGROWTH: ICD-10-CM

## 2017-08-21 DIAGNOSIS — Z94.83 HISTORY OF PANCREAS TRANSPLANT (HCC): ICD-10-CM

## 2017-08-21 DIAGNOSIS — R10.10 UPPER ABDOMINAL PAIN: Primary | ICD-10-CM

## 2017-08-21 DIAGNOSIS — K31.84 GASTROPARESIS: ICD-10-CM

## 2017-08-21 DIAGNOSIS — Z80.0 FAMILY HISTORY OF CANCER OF SMALL INTESTINE: ICD-10-CM

## 2017-08-21 DIAGNOSIS — Z79.1 NSAID LONG-TERM USE: ICD-10-CM

## 2017-08-21 DIAGNOSIS — R79.89 LFT ELEVATION: ICD-10-CM

## 2017-08-21 DIAGNOSIS — Z83.2 FAMILY HISTORY OF AUTOIMMUNE DISORDER: ICD-10-CM

## 2017-08-21 DIAGNOSIS — K21.9 GASTROESOPHAGEAL REFLUX DISEASE, ESOPHAGITIS PRESENCE NOT SPECIFIED: ICD-10-CM

## 2017-08-21 DIAGNOSIS — Z13.220 ENCOUNTER FOR SCREENING FOR LIPID DISORDER: ICD-10-CM

## 2017-08-21 PROCEDURE — 99205 OFFICE O/P NEW HI 60 MIN: CPT | Performed by: NURSE PRACTITIONER

## 2017-08-21 RX ORDER — PANTOPRAZOLE SODIUM 40 MG/1
40 TABLET, DELAYED RELEASE ORAL DAILY
Qty: 30 TABLET | Refills: 2 | Status: SHIPPED | OUTPATIENT
Start: 2017-08-21 | End: 2018-02-20 | Stop reason: SDUPTHER

## 2017-08-21 RX ORDER — ATORVASTATIN CALCIUM 40 MG/1
1 TABLET, FILM COATED ORAL DAILY
COMMUNITY
Start: 2017-08-09 | End: 2017-11-20

## 2017-08-21 NOTE — PROGRESS NOTES
OUTPATIENT NEW PATIENT NOTE  Patient: JANI JIMENES : 1960  Date of Service: 2017  Dear Dr.Wanda NAJMA Torres MD   Thank you for your referral of this patient for evaluation of GERD and gastroparesis.   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  Start pantoprazole (PROTONIX) 40 MG EC tablet; Take 1 tablet by mouth Daily. Take first thing in the morning on an empty stomach.  Wait at least 30 min to 1hr before eating.  · Anti-reflux measures.  Written instructions given.  Avoid NSAIDS if possible.  Pt is encouraged to talk to PCP for alternative approach for pain management such as physical therapy, exercises, muscle relaxants, etc.   Obtain recent CT done outside    Gastroparesis.  Allergic to Reglan. Erythromycin not effective.  Post-pancreatectomy diabetes  -     Refer pt to Dr Cantrell [facility].   · Follow up w/ endocrinologist    Small intestinal bacterial overgrowth  Chronic leukocytosis and elevated eosinophils  Thrombocytosis r/t splenectomy   -     Repeat another round of rifaximin (XIFAXAN) 550 MG tablet; Take 1 tablet by mouth Every 8 (Eight) Hours for 14 days.    LFT elevation  -     Iron Profile  -     Ferritin  -     Hepatitis A Antibody, Total   -     Hepatitis B Surface Antibody  -     Celiac Ab tTG DGP TIgA  -     Alkaline Phosphatase, Isoenzymes  -     Alpha - 1 - Antitrypsin Phenotype  -     TO  -     Anti-microsomal Antibody  -     Anti-Smooth Muscle Antibody Titer  -     Mitochondrial Antibodies, M2    Family history of cancer of small intestine and  autoimmune disorder    Follow Up: Return in about 3 months (around 11/21/2017) for Recheck.      DISCUSSION: The above plan was delineated in details with patient and all questions and concerns were answered.  Patient is also given contact information.  Patient is to return as scheduled or sooner if new problems arise  Subjective                                                     SUBJECTIVE   History of Present Illness  Ms. Renée Laurent is a 56 y.o. female Female w/ recurrent pancreatitis related to pancreatic divisum and cystic fibrosis heterozygous.  She was evaluated at [facility].  Due to persistent abdominal pain, pt underwent Total Pancreatectomy with Islet Auto Transplantation of isolate (TPIAT) at [facility] by Dr Rigoberto Whiting in 2011.  Pt had insufficient endogenous insulin production and required additional insulin.  She developed gastroparesis.  Tx w/ Reglan caused dyskinesia. Tried erythromycin, but it did not work for gastroparesis. Patient have had 2 gastric emptying studies done in the past. Has not seen a gastroparesis specialist. Was recently placed on insulin pump 8/2017.    Pt was evaluated by Dr. Jesus Boudreaux (Director of Pancreatobiliary Endoscopy) from [facility] 2/2016 for bloating, n/v, abd distention pain. Pt had 2 courses of rifaximin for SIBO in 2016 w/ sx improvement. No scope done at [place]. Pt is here for transfer of care.       Continue to have chronic RUQ and upper abd pain since teenager and worsened since surgery 2011. Pain is intermittent, but quite frequent.  Eating makes it worsened. Frequent Nausea.  Vomits w/ undigested food and acid.  Emesis w/ acid is more common. Pt has chronic GERD. EGDs x 2 were done before and after TPIAT surgery.  Last one is 2015. Has been on Nexium since surgery.  Not taking Nexium daily (only about 2x a wk) d/t dx of osteopenia 2012.  Regurgitates and reflux every couple wks. Repeat  bone scan is scheduled in 9/2017. Takes vitamin D and calcium supplement. Not on iron supplement. Long hx of anemia, requiring periodic IV iron infusion. Takes ibuprofen 600-800 mg OTC once a daily for a couple yrs for chronic pain.     Takes Creon since pancreas surgery, but it makes pt bloated and constipated. Feeling incomplete emptied. Colonoscopy, done in 2011 at [facility], and was reportedly normal without evidence of polyps. Patient does not know when her repeat colonoscopy was recommended.     Has long hx of HLP for at least 3 yrs, but was only placed on Lipitor (atorvastatin) recently. Recent LFT elevation.  Gained 20 lbs w/in 16 months. Unsure of LFT elevation was before or after being placed on Lipitor. Mother has cancer of the small intestine, Hashimoto thyroid, disease and osteoporosis. Patient is currently undergoing workup for potential thyroid disease.  ROS:Review of Systems   Constitutional: Negative.    HENT: Negative.    Eyes: Negative.    Respiratory: Negative.    Cardiovascular: Negative.    Gastrointestinal: Positive for abdominal pain, constipation, nausea and vomiting.   Endocrine: Negative.    Genitourinary: Negative.    Musculoskeletal: Negative.    Skin: Negative.    Allergic/Immunologic: Positive for food allergies.   Neurological: Negative.    Hematological: Negative.    Psychiatric/Behavioral: Negative.      PAST MED HX: Pt  has a past medical history of Abdominal pain; Absence of pancreas, acquired; Anxiety; Asthma; Chronic illness; Contact dermatitis; Depression; Diabetes mellitus; Encounter for dental examination (07/2014); Gastroparesis; GERD (gastroesophageal reflux disease); Hyperlipidemia; Incisional hernia; Insomnia; Iron deficiency; Myeloid leukemia; Osteopenia; Post-splenectomy; and Vitamin D deficiency.  PAST SURG HX:   · Cervical discectomy and fusion in 1997  · Total abdominal hysterectomy w/ bilateral salpingoophorectomy in 2004  · Cholecystectomy in 2007  · Total  Pancreatectomy with Islet Auto Transplantation of isolate (TPIAT) 12/2011,   · Incisional hernia repair w/ mesh placement 2014    FAM HX: family history includes small intestine cancer in her mother; Hashimoto's thyroiditis in her mother; Lung cancer in her father; Osteoporosis in her mother.  SOC HX: Pt  reports that she has never smoked. She has never used smokeless tobacco. She reports that she does not drink alcohol or use illicit drugs.  Objective                                                           OBJECTIVE   Allergy: Pt is allergic to coconut oil; aspirin; phenergan [promethazine hcl]; and reglan [metoclopramide].  MEDS: •  atorvastatin (LIPITOR) 40 MG tablet, Take 1 tablet by mouth Daily., Disp: , Rfl:   •  desvenlafaxine (PRISTIQ) 100 MG 24 hr tablet, TAKE ONE TABLET BY MOUTH DAILY, Disp: 30 tablet, Rfl: 4  •  esomeprazole (nexIUM) 40 MG capsule, Take 1 capsule by mouth Every Morning Before Breakfast., Disp: 30 capsule, Rfl: 5  •  glucagon (GLUCAGON EMERGENCY) 1 MG injection, Inject 1 mg under the skin 1 (one) time as needed for low blood sugar for up to 1 dose., Disp: 1 kit, Rfl: 12  •  glucose blood test strip, Used to test blood sugar 4 times daily for diabetes. E10.9, Disp: 150 each, Rfl: 12  •  Insulin Glargine (LANTUS SOLOSTAR) 100 UNIT/ML injection pen, Inject 10 Units under the skin Every Night. For diabetes E10.9, Disp: 5 pen, Rfl: 1  •  Insulin Lispro (HUMALOG KWIKPEN) 100 UNIT/ML solution pen-injector, 3 times per day, sliding scale. 1 unit for every 30 points over 175 for diabetes. E10.9, Disp: 10 pen, Rfl: 1  •  magnesium oxide (MAGOX) 400 (241.3 MG) MG tablet tablet, Take 400 mg by mouth daily., Disp: , Rfl:   •  Multiple Vitamins-Minerals (AQUADEKS PO), Take  by mouth., Disp: , Rfl:   •  ondansetron (ZOFRAN) 4 MG tablet, Take 1 tablet by mouth Every 8 (Eight) Hours As Needed for Nausea or Vomiting., Disp: 60 tablet, Rfl: 5  •  pancrelipase, Lip-Prot-Amyl, (CREON) 86379 UNITS capsule  delayed-release particles capsule, Take 3 tablets tid with meals and 1-2 with snacks, Disp: 360 capsule, Rfl: 5  •  vitamin D (ERGOCALCIFEROL) 61979 UNITS capsule capsule, TAKE ONE CAPSULE BY MOUTH EVERY 7 DAYS, Disp: 4 capsule, Rfl: 0  •  zolpidem (AMBIEN) 10 MG tablet, Take 1 tablet by mouth At Night As Needed for Sleep., Disp: 30 tablet, Rfl: 2  Lab Results   Component Value Date    WBC 12.64 (H) 01/13/2017    HGB 13.0 01/13/2017    HCT 39.5 01/13/2017    MCV 90.6 01/13/2017    MCHC 32.9 01/13/2017     (H) 01/13/2017     Lab Results   Component Value Date    WBC 12.64 (H) 01/13/2017    WBC 13.85 (H) 12/20/2016    EOSABS 0.85 (H) 01/13/2017    EOSABS 1.02 (H) 12/20/2016     Lab Results   Component Value Date     05/11/2017    K 4.6 05/11/2017    CL 99 05/11/2017    CO2 36.0 (H) 05/11/2017    BUN 15 05/11/2017    CREATININE 0.70 05/11/2017    GLUCOSE 116 (H) 05/11/2017    CALCIUM 10.6 (H) 05/11/2017    ANIONGAP 4.0 05/11/2017     Lab Results   Component Value Date    AST 33 05/11/2017    AST 32 12/20/2016    ALT 34 05/11/2017    ALT 27 12/20/2016    ALKPHOS 183 (H) 05/11/2017    ALKPHOS 160 (H) 12/20/2016    BILITOT 0.2 (L) 05/11/2017    PROTEINTOT 8.7 (H) 05/11/2017    ALBUMIN 4.90 (H) 05/11/2017   [addendum:    Per outside medical record, lab work done on 8/23/17 at [facility] in [place]  AST 27 ALT 29 alkaline phosphatase 152 total bili 0.2 albumin 4.7 total protein 8.3 serum creatinine 0.7 BUN 16   Hemoglobin A1c 7.0  Triglycerides elevated at 265 total cholesterol elevated at 310   Alkaline phosphatase isoenzyme with alkaline phosphatase elevated at 153.  Liver fraction normal at 39 bone fraction normal at 19 intestinal fraction elevated at 41 (normal is at 0-18%)  Ferritin 122  Iron 81  TIBC 351  Iron saturation 23%  Liver kidney microsomal antibody less than 1.0  Celiac disease compared to panel negative  Mitochondrial antibody less than 20.0  Anti-smooth muscle antibody 9  TO  negative  Alpha-1 antitrypsin phenotype  Hepatitis B surface antibody negative  Hepatitis A IgM negative      Wt Readings from Last 5 Encounters:   08/21/17 148 lb (67.1 kg)   05/11/17 141 lb 6.4 oz (64.1 kg)   01/20/17 141 lb 12.8 oz (64.3 kg)   12/20/16 145 lb (65.8 kg)   06/01/16 140 lb 3.2 oz (63.6 kg)   body mass index is 24.63 kg/(m^2).,Temp: 97.5 °F (36.4 °C),BP: 108/68,Heart Rate: 92   Physical Exam  General Well developed; well nourished; no acute distress.   ENT Oral mucosa pink and moist without thrush or lesions.    Neck Neck supple; trachea midline. No thyromegaly   Resp CTA; no rhonchi, rales, or wheezes.  Respiration effort normal  CV RRR; normal S1, S2; no M/R/G. No lower extremity edema  GI Abd soft, NT, ND, normal active bowel sounds.  No HSM.  No abd hernia  Skin No rash; no lesions; no bruises.  Skin turgor normal  Musc No clubbing; no cyanosis.  Gait steady  Psych Oriented to time, place, and person.  Appropriate affect  Thank you kindly for allowing me to be part of this patient’s care.    Pt care team: Kati PAREKH & Krish Mcdaniel [facility]--Gastroenterology  [phone]    CC: Dr.Wanda NAJMA Torres MD  [address] [place] [address] / [place] [address] FAX:[phone]

## 2017-08-23 ENCOUNTER — TELEPHONE (OUTPATIENT)
Dept: GASTROENTEROLOGY | Facility: CLINIC | Age: 57
End: 2017-08-23

## 2017-08-23 NOTE — TELEPHONE ENCOUNTER
Gabby  I don't remember if I've already sent you a message.  Pls refer tp to Dr Óscar poole [facility] for gastroparesis.  Thanks

## 2017-08-28 ENCOUNTER — OFFICE VISIT (OUTPATIENT)
Dept: INTERNAL MEDICINE | Facility: CLINIC | Age: 57
End: 2017-08-28

## 2017-08-28 VITALS
WEIGHT: 146.2 LBS | SYSTOLIC BLOOD PRESSURE: 108 MMHG | TEMPERATURE: 97.3 F | BODY MASS INDEX: 24.36 KG/M2 | OXYGEN SATURATION: 95 % | DIASTOLIC BLOOD PRESSURE: 68 MMHG | HEIGHT: 65 IN | HEART RATE: 85 BPM

## 2017-08-28 DIAGNOSIS — E89.1 POST-PANCREATECTOMY DIABETES (HCC): ICD-10-CM

## 2017-08-28 DIAGNOSIS — E10.9 TYPE 1 DIABETES MELLITUS WITHOUT COMPLICATIONS (HCC): ICD-10-CM

## 2017-08-28 DIAGNOSIS — E13.9 POST-PANCREATECTOMY DIABETES (HCC): ICD-10-CM

## 2017-08-28 DIAGNOSIS — E78.2 MIXED HYPERLIPIDEMIA: ICD-10-CM

## 2017-08-28 DIAGNOSIS — R06.09 DYSPNEA ON EXERTION: Primary | ICD-10-CM

## 2017-08-28 DIAGNOSIS — Z90.410 POST-PANCREATECTOMY DIABETES (HCC): ICD-10-CM

## 2017-08-28 PROCEDURE — 93000 ELECTROCARDIOGRAM COMPLETE: CPT | Performed by: FAMILY MEDICINE

## 2017-08-28 PROCEDURE — 99214 OFFICE O/P EST MOD 30 MIN: CPT | Performed by: FAMILY MEDICINE

## 2017-08-28 RX ORDER — ZOLPIDEM TARTRATE 10 MG/1
10 TABLET ORAL NIGHTLY PRN
Qty: 30 TABLET | Refills: 2 | Status: SHIPPED | OUTPATIENT
Start: 2017-08-28 | End: 2017-11-20 | Stop reason: SDUPTHER

## 2017-08-28 NOTE — PROGRESS NOTES
"Leni Laurent is a 56 y.o. female.     Chief Complaint   Patient presents with   • type 1 diabetes     patient stated she has her pump   • post-pancreatectomy   • Med Refill       Visit Vitals   • /68   • Pulse 85   • Temp 97.3 °F (36.3 °C)   • Ht 65\" (165.1 cm)   • Wt 146 lb 3.2 oz (66.3 kg)   • LMP  (LMP Unknown)   • SpO2 95%   • BMI 24.33 kg/m2       Diabetes   She presents for her follow-up diabetic visit. She has type 1 diabetes mellitus. MedicAlert identification noted. Her disease course has been worsening. Hypoglycemia symptoms include confusion and sweats. Pertinent negatives for hypoglycemia include no dizziness, headaches or nervousness/anxiousness. (Palpitations and fatigue) Associated symptoms include fatigue. Pertinent negatives for diabetes include no blurred vision, no chest pain, no foot paresthesias, no foot ulcerations, no polydipsia, no polyphagia, no polyuria, no visual change, no weakness and no weight loss. There are no hypoglycemic complications. Pertinent negatives for hypoglycemia complications include no blackouts, no hospitalization, no nocturnal hypoglycemia, no required assistance and no required glucagon injection. Diabetic complications include autonomic neuropathy. Pertinent negatives for diabetic complications include no CVA, heart disease, impotence, nephropathy, peripheral neuropathy, PVD or retinopathy. Risk factors for coronary artery disease include diabetes mellitus, dyslipidemia and post-menopausal. Current diabetic treatment includes insulin injections and intensive insulin program (on insulin pump). She is compliant with treatment most of the time. Her weight is increasing steadily. She is following a diabetic diet. Meal planning includes avoidance of concentrated sweets. She has not had a previous visit with a dietitian. She participates in exercise daily. Her home blood glucose trend is increasing rapidly. Her breakfast blood glucose range is " generally >200 mg/dl. Her highest blood glucose is >200 mg/dl. An ACE inhibitor/angiotensin II receptor blocker is being taken. She does not see a podiatrist.Eye exam is current (tomorrow).   Hyperlipidemia   This is a new problem. The current episode started more than 1 month ago. The problem is uncontrolled. Recent lipid tests were reviewed and are high. Exacerbating diseases include diabetes. She has no history of chronic renal disease, hypothyroidism, liver disease, obesity or nephrotic syndrome. There are no known factors aggravating her hyperlipidemia. Associated symptoms include shortness of breath. Pertinent negatives include no chest pain, focal sensory loss, focal weakness, leg pain or myalgias. Current antihyperlipidemic treatment includes statins. Compliance problems include medication side effects.  Risk factors for coronary artery disease include dyslipidemia, diabetes mellitus and post-menopausal.   Shortness of Breath   This is a recurrent problem. The current episode started more than 1 month ago (3-4 months). The problem occurs intermittently. The problem has been waxing and waning. Associated symptoms include abdominal pain, orthopnea, PND and vomiting. Pertinent negatives include no chest pain, claudication, coryza, ear pain, fever, headaches, hemoptysis, leg pain, leg swelling, neck pain, rash, rhinorrhea, sore throat, sputum production, swollen glands, syncope or wheezing. The symptoms are aggravated by exercise and weather changes. The patient has no known risk factors for DVT/PE. She has tried rest for the symptoms. The treatment provided mild relief. Her past medical history is significant for asthma. There is no history of allergies, aspirin allergies, bronchiolitis, CAD, chronic lung disease, COPD, DVT, a heart failure, PE, pneumonia or a recent surgery.      Pt has endoscopy scheduled for 9/8/17 with Dr Mehta.   Pt did see GI.   Pt had lab done in [place] that is not back yet.    Pt's glucose this am was 242. Pt has held the lipitor as that is the only new medication.   Pt's shortness of breath is intermittent and fells like dog or elephant on chest. Pt denies chest pain.     Pt's sugar yesterday 363.     The following portions of the patient's history were reviewed and updated as appropriate: allergies, current medications, past family history, past medical history, past social history, past surgical history and problem list.    Past Medical History:   Diagnosis Date   • Abdominal pain    • Absence of pancreas, acquired    • Anxiety    • Asthma    • Chronic illness    • Contact dermatitis     due to plants   • Depression    • Diabetes mellitus    • Encounter for dental examination 07/2014   • Gastroparesis    • GERD (gastroesophageal reflux disease)    • Hyperlipidemia    • Incisional hernia    • Insomnia    • Iron deficiency    • Myeloid leukemia    • Osteopenia    • Post-splenectomy    • Vitamin D deficiency        Past Surgical History:   Procedure Laterality Date   • CERVICAL DISCECTOMY ANTERIOR     • COLONOSCOPY     • GALLBLADDER SURGERY     • HERNIA MESH REMOVAL  2014    abdominal hernia with mesh    • OTHER SURGICAL HISTORY      duodeum removed   • OTHER SURGICAL HISTORY      jejunem removed   • PANCREAS SURGERY      pancreas removal   • STOMACH SURGERY      distal stomach    • TOTAL ABDOMINAL HYSTERECTOMY WITH SALPINGO OOPHORECTOMY         Allergies   Allergen Reactions   • Coconut Oil      Difficulty speaking, swallowing, hives     • Aspirin Hives   • Phenergan [Promethazine Hcl]      Anxiety/relessness   • Reglan [Metoclopramide]      Involuntary movement       Family History   Problem Relation Age of Onset   • Osteoporosis Mother    • Cancer Mother      small bowel cancer   • Hashimoto's thyroiditis Mother    • Lung cancer Father        Social History     Social History   • Marital status:      Spouse name: N/A   • Number of children: N/A   • Years of education: N/A      Occupational History   • Not on file.     Social History Main Topics   • Smoking status: Never Smoker   • Smokeless tobacco: Never Used   • Alcohol use No   • Drug use: No   • Sexual activity: Yes     Birth control/ protection: None     Other Topics Concern   • Not on file     Social History Narrative       Review of Systems   Constitutional: Positive for fatigue and unexpected weight change. Negative for chills, diaphoresis, fever and weight loss.        Weight gain   HENT: Negative.  Negative for ear pain, nosebleeds, postnasal drip, rhinorrhea, sinus pressure, sneezing and sore throat.    Eyes: Negative.  Negative for blurred vision, redness and itching.   Respiratory: Positive for shortness of breath. Negative for cough, hemoptysis, sputum production and wheezing.    Cardiovascular: Positive for orthopnea and PND. Negative for chest pain, palpitations, claudication, leg swelling and syncope.   Gastrointestinal: Positive for abdominal distention, abdominal pain, constipation, nausea and vomiting. Negative for diarrhea.   Endocrine: Negative.  Negative for cold intolerance, heat intolerance, polydipsia, polyphagia and polyuria.   Genitourinary: Negative.  Negative for dysuria, frequency, hematuria, impotence and urgency.   Musculoskeletal: Negative.  Negative for arthralgias, back pain, myalgias and neck pain.   Skin: Negative.  Negative for color change and rash.   Allergic/Immunologic: Negative.  Negative for environmental allergies.   Neurological: Negative.  Negative for dizziness, focal weakness, syncope, weakness, light-headedness and headaches.   Hematological: Negative.  Negative for adenopathy. Does not bruise/bleed easily.   Psychiatric/Behavioral: Positive for confusion. Negative for dysphoric mood. The patient is not nervous/anxious.         Confusion with hypoglycemia.        Objective   Physical Exam   Constitutional: She is oriented to person, place, and time. She appears well-developed.   HENT:    Head: Normocephalic.   Right Ear: External ear normal.   Left Ear: External ear normal.   Nose: Nose normal.   Mouth/Throat: Oropharynx is clear and moist.   Eyes: Conjunctivae and EOM are normal. Pupils are equal, round, and reactive to light.   Neck: Normal range of motion. Neck supple.   Cardiovascular: Normal rate and regular rhythm.    No murmur heard.  Pulmonary/Chest: Effort normal and breath sounds normal.   Abdominal: Soft. Bowel sounds are normal. There is tenderness in the epigastric area.   Musculoskeletal: Normal range of motion.   Neurological: She is alert and oriented to person, place, and time.   Skin: Skin is warm and dry.   Psychiatric: She has a normal mood and affect. Her behavior is normal.   Nursing note and vitals reviewed.      Assessment/Plan   Renée was seen today for type 1 diabetes, post-pancreatectomy and med refill.    Diagnoses and all orders for this visit:    Dyspnea on exertion  -     ECG 12 Lead  -     Ambulatory Referral to Cardiology    Post-pancreatectomy diabetes  -     pancrelipase, Lip-Prot-Amyl, (CREON) 26079 units capsule delayed-release particles capsule; Take 3 tablets tid with meals and 1-2 with snacks    Type 1 diabetes mellitus without complications  -     Ambulatory Referral to Cardiology    Mixed hyperlipidemia  -     Ambulatory Referral to Cardiology    Other orders  -     zolpidem (AMBIEN) 10 MG tablet; Take 1 tablet by mouth At Night As Needed for Sleep.      Wait 1 week and see if blood sugar better off of lipitor, if so will change statin.              Current Outpatient Prescriptions:   •  atorvastatin (LIPITOR) 40 MG tablet, Take 1 tablet by mouth Daily., Disp: , Rfl:   •  desvenlafaxine (PRISTIQ) 100 MG 24 hr tablet, TAKE ONE TABLET BY MOUTH DAILY, Disp: 30 tablet, Rfl: 4  •  glucagon (GLUCAGON EMERGENCY) 1 MG injection, Inject 1 mg under the skin 1 (one) time as needed for low blood sugar for up to 1 dose., Disp: 1 kit, Rfl: 12  •  glucose blood test  strip, Used to test blood sugar 4 times daily for diabetes. E10.9, Disp: 150 each, Rfl: 12  •  insulin lispro (HumaLOG) 100 UNIT/ML patient supplied pump, Inject  under the skin Continuous. 1 unit per 25, Disp: , Rfl:   •  magnesium oxide (MAGOX) 400 (241.3 MG) MG tablet tablet, Take 400 mg by mouth daily., Disp: , Rfl:   •  Multiple Vitamins-Minerals (AQUADEKS PO), Take  by mouth., Disp: , Rfl:   •  ondansetron (ZOFRAN) 4 MG tablet, Take 1 tablet by mouth Every 8 (Eight) Hours As Needed for Nausea or Vomiting., Disp: 60 tablet, Rfl: 5  •  pancrelipase, Lip-Prot-Amyl, (CREON) 30351 units capsule delayed-release particles capsule, Take 3 tablets tid with meals and 1-2 with snacks, Disp: 360 capsule, Rfl: 5  •  pantoprazole (PROTONIX) 40 MG EC tablet, Take 1 tablet by mouth Daily. Take first thing in the morning on an empty stomach.  Wait at least 30 min to 1hr before eating., Disp: 30 tablet, Rfl: 2  •  vitamin D (ERGOCALCIFEROL) 56891 UNITS capsule capsule, TAKE ONE CAPSULE BY MOUTH EVERY 7 DAYS, Disp: 4 capsule, Rfl: 0  •  zolpidem (AMBIEN) 10 MG tablet, Take 1 tablet by mouth At Night As Needed for Sleep., Disp: 30 tablet, Rfl: 2    Return in about 3 months (around 11/28/2017), or if symptoms worsen or fail to improve, for Recheck.      ECG 12 Lead  Date/Time: 8/28/2017 3:46 PM  Performed by: MARTITA MENDES  Authorized by: MARTITA MENDES   Comparison: not compared with previous ECG   Previous ECG: no previous ECG available  Rhythm: sinus rhythm  Rate: normal  BPM: 83  Conduction: conduction normal  ST Segments: ST segments normal  T Waves: T waves normal  QRS axis: normal (P, R, T: 67, 55, 22)  Other: no other findings  Clinical impression: normal ECG          Mannie report reviewed and is consistent #[id number]

## 2017-09-21 ENCOUNTER — TELEPHONE (OUTPATIENT)
Dept: GASTROENTEROLOGY | Facility: CLINIC | Age: 57
End: 2017-09-21

## 2017-09-21 NOTE — TELEPHONE ENCOUNTER
Talked to patient this afternoon. I informed her that we haven't received her labs results from her hometown lab. She stated she will call them today and get them to fax over the results to Kati Harris.

## 2017-10-03 ENCOUNTER — TELEPHONE (OUTPATIENT)
Dept: GASTROENTEROLOGY | Facility: CLINIC | Age: 57
End: 2017-10-03

## 2017-10-03 NOTE — TELEPHONE ENCOUNTER
Pls tell pt that I apologize for the delay.  I didn't not know about the labs are were in since it was done outside.    Labs show no evidence of autoimmune condition to explain abnormal liver enzymes.  No hepatitis infection.   Liver enzymes elevation is likely to be r/t fatty liver.  · Pls follow up w/ PCP for mgt of HLP and DM  · I encourage moderate exercise for weight control  · I recommend a referral to a dietician (if she has not already seen one in the past for DM diet education and weight loss).  Pt lives in [place].  I can send a referral order for a dietician if she is interested.     Per outside medical record, lab work done on 8/23/17 at [facility] in [place]  AST 27 ALT 29 alkaline phosphatase 152 total bili 0.2 albumin 4.7 total protein 8.3 serum creatinine 0.7 BUN 16   Hemoglobin A1c 7.0  Triglycerides elevated at 265 total cholesterol elevated at 310   Alkaline phosphatase isoenzyme with alkaline phosphatase elevated at 153.  Liver fraction normal at 39 bone fraction normal at 19 intestinal fraction elevated at 41 (normal is at 0-18%)  Ferritin 122  Iron 81  TIBC 351  Iron saturation 23%  Liver kidney microsomal antibody less than 1.0  Celiac disease compared to panel negative  Mitochondrial antibody less than 20.0  Anti-smooth muscle antibody 9  TO negative  Alpha-1 antitrypsin phenotype  Hepatitis B surface antibody negative  Hepatitis A IgM negative

## 2017-10-03 NOTE — TELEPHONE ENCOUNTER
Gave patient her outside lab results per Kati Harris. Patient stated she will leave everything as is for now. She has a follow up with her PCP next week. She will see Kati in November 2017 for her follow up visit with her.

## 2017-10-10 ENCOUNTER — TELEPHONE (OUTPATIENT)
Dept: GASTROENTEROLOGY | Facility: CLINIC | Age: 57
End: 2017-10-10

## 2017-10-10 NOTE — TELEPHONE ENCOUNTER
Patient called back. Informed her that we have the Creon 24,000 units samples. Patient stated she will pick them up next week. She is coming from [facility]

## 2017-10-25 ENCOUNTER — LAB REQUISITION (OUTPATIENT)
Dept: LAB | Facility: HOSPITAL | Age: 57
End: 2017-10-25

## 2017-10-25 ENCOUNTER — OUTSIDE FACILITY SERVICE (OUTPATIENT)
Dept: GASTROENTEROLOGY | Facility: CLINIC | Age: 57
End: 2017-10-25

## 2017-10-25 DIAGNOSIS — R10.10 UPPER ABDOMINAL PAIN: ICD-10-CM

## 2017-10-25 PROCEDURE — 88312 SPECIAL STAINS GROUP 1: CPT | Performed by: INTERNAL MEDICINE

## 2017-10-25 PROCEDURE — 88305 TISSUE EXAM BY PATHOLOGIST: CPT | Performed by: INTERNAL MEDICINE

## 2017-10-25 PROCEDURE — 43239 EGD BIOPSY SINGLE/MULTIPLE: CPT | Performed by: INTERNAL MEDICINE

## 2017-10-26 LAB
CYTO UR: NORMAL
LAB AP CASE REPORT: NORMAL
LAB AP CLINICAL INFORMATION: NORMAL
Lab: NORMAL
PATH REPORT.FINAL DX SPEC: NORMAL
PATH REPORT.GROSS SPEC: NORMAL

## 2017-10-27 RX ORDER — ONDANSETRON 4 MG/1
TABLET, FILM COATED ORAL
Qty: 60 TABLET | Refills: 4 | Status: SHIPPED | OUTPATIENT
Start: 2017-10-27 | End: 2018-02-20 | Stop reason: SDUPTHER

## 2017-11-20 ENCOUNTER — OFFICE VISIT (OUTPATIENT)
Dept: INTERNAL MEDICINE | Facility: CLINIC | Age: 57
End: 2017-11-20

## 2017-11-20 VITALS
SYSTOLIC BLOOD PRESSURE: 110 MMHG | HEART RATE: 79 BPM | DIASTOLIC BLOOD PRESSURE: 74 MMHG | WEIGHT: 148.6 LBS | OXYGEN SATURATION: 95 % | BODY MASS INDEX: 24.73 KG/M2 | TEMPERATURE: 96.6 F

## 2017-11-20 DIAGNOSIS — G47.00 INSOMNIA, UNSPECIFIED TYPE: ICD-10-CM

## 2017-11-20 DIAGNOSIS — M54.6 CHRONIC MIDLINE THORACIC BACK PAIN: ICD-10-CM

## 2017-11-20 DIAGNOSIS — Z23 NEED FOR INFLUENZA VACCINATION: Primary | ICD-10-CM

## 2017-11-20 DIAGNOSIS — F32.A DEPRESSION, UNSPECIFIED DEPRESSION TYPE: ICD-10-CM

## 2017-11-20 DIAGNOSIS — G89.29 CHRONIC MIDLINE THORACIC BACK PAIN: ICD-10-CM

## 2017-11-20 DIAGNOSIS — R10.13 EPIGASTRIC PAIN: ICD-10-CM

## 2017-11-20 PROCEDURE — 99214 OFFICE O/P EST MOD 30 MIN: CPT | Performed by: FAMILY MEDICINE

## 2017-11-20 PROCEDURE — 90686 IIV4 VACC NO PRSV 0.5 ML IM: CPT | Performed by: FAMILY MEDICINE

## 2017-11-20 PROCEDURE — 90471 IMMUNIZATION ADMIN: CPT | Performed by: FAMILY MEDICINE

## 2017-11-20 RX ORDER — UBIDECARENONE 50 MG
50 CAPSULE ORAL DAILY
COMMUNITY
End: 2021-05-07

## 2017-11-20 RX ORDER — DESVENLAFAXINE 100 MG/1
100 TABLET, EXTENDED RELEASE ORAL DAILY
Qty: 30 TABLET | Refills: 4 | Status: SHIPPED | OUTPATIENT
Start: 2017-11-20 | End: 2018-02-20 | Stop reason: SDUPTHER

## 2017-11-20 RX ORDER — ASCORBIC ACID 500 MG
500 TABLET ORAL DAILY
COMMUNITY

## 2017-11-20 RX ORDER — GLUCOSAMINE/D3/BOSWELLIA SERRA 1500MG-400
1 TABLET ORAL DAILY
COMMUNITY

## 2017-11-20 RX ORDER — ZOLPIDEM TARTRATE 10 MG/1
10 TABLET ORAL NIGHTLY PRN
Qty: 30 TABLET | Refills: 2 | Status: SHIPPED | OUTPATIENT
Start: 2017-11-20 | End: 2018-02-20 | Stop reason: SDUPTHER

## 2017-11-20 NOTE — PROGRESS NOTES
Subjective   Renée Laurent is a 56 y.o. female.     Chief Complaint   Patient presents with   • Med Management     3 month follow up visit       Visit Vitals   • /74   • Pulse 79   • Temp 96.6 °F (35.9 °C)   • Wt 148 lb 9.6 oz (67.4 kg)   • LMP  (LMP Unknown)   • SpO2 95%   • BMI 24.73 kg/m2       Abdominal Pain   This is a chronic problem. The current episode started more than 1 month ago. The onset quality is gradual. The problem occurs 2 to 4 times per day. The most recent episode lasted 3 days. The problem has been gradually worsening. The pain is located in the epigastric region. The pain is at a severity of 7/10. The quality of the pain is burning, cramping and a sensation of fullness. The abdominal pain radiates to the back. Associated symptoms include anorexia, constipation, diarrhea, nausea and vomiting. Pertinent negatives include no arthralgias, belching, dysuria, fever, flatus, frequency, headaches, hematochezia, hematuria, melena, myalgias or weight loss. The pain is aggravated by eating. The pain is relieved by certain positions, palpation and recumbency. Prior diagnostic workup includes upper endoscopy.   Back Pain   This is a recurrent problem. The current episode started more than 1 year ago. The problem occurs intermittently. The problem has been waxing and waning since onset. The pain is present in the thoracic spine. The quality of the pain is described as aching and burning. The pain does not radiate. The pain is at a severity of 8/10. The symptoms are aggravated by bending and position (food). Associated symptoms include abdominal pain. Pertinent negatives include no bladder incontinence, bowel incontinence, chest pain, dysuria, fever, headaches, leg pain, numbness, paresis, paresthesias, pelvic pain, perianal numbness, tingling, weakness or weight loss. Risk factors: myeloid leukemia. She has tried analgesics for the symptoms. The treatment provided moderate relief.      Pt is taking  the MCT oil and is digesting better.  Pt us taking polyglycoplex x and is not craving sugar.  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   Pt had her upper endoscopy and has bile reflux and stomach reflux and will wake up with bile reflux.  Pt is off of narcotics for 2 years.     Pt has chronic pain at 3 up to 7.     Pt is taking CBD oil otc from [employer].   Pt cannot take reglan, pt does not tolerate the erythromycin.     Pt will have pain in her back with stomach bloating. Topical analgesics don't work on back pain.   Pt has had back nerves cut, with pain relief for 3 months,     Pt tried carafate for 2 weeks without improvement.     Pt tried THC with improvement while in [facility], with improvement.       The following portions of the patient's history were reviewed and updated as appropriate: allergies, current medications, past family history, past medical history, past social history, past surgical history and problem list.   Past Medical History:   Diagnosis Date   • Abdominal pain    • Absence of pancreas, acquired    • Anxiety    • Asthma    • Chronic illness    • Contact dermatitis     due to plants   • Depression    • Diabetes mellitus    • Encounter for dental examination 07/2014   • Gastroparesis    • GERD (gastroesophageal reflux disease)    • Hyperlipidemia    • Incisional hernia    • Insomnia    • Iron deficiency    • Myeloid leukemia    • Osteopenia    • Post-splenectomy    • Vitamin D deficiency        Past Surgical History:   Procedure Laterality Date   • CERVICAL DISCECTOMY ANTERIOR     • COLONOSCOPY     • GALLBLADDER SURGERY     • HERNIA MESH REMOVAL  2014    abdominal hernia with mesh    • OTHER SURGICAL HISTORY      duodeum removed   • OTHER SURGICAL HISTORY      jejunem removed   • PANCREAS SURGERY      pancreas removal   • STOMACH SURGERY      distal stomach    • TOTAL ABDOMINAL HYSTERECTOMY WITH SALPINGO OOPHORECTOMY      • UPPER GASTROINTESTINAL ENDOSCOPY  10/25/2017    Dr Mehta, gastritis, eosinphils in esophagus       Allergies   Allergen Reactions   • Aspirin Hives   • Phenergan [Promethazine Hcl]      Anxiety/relessness   • Reglan [Metoclopramide]      Involuntary movement       Family History   Problem Relation Age of Onset   • Osteoporosis Mother    • Cancer Mother      small bowel cancer   • Hashimoto's thyroiditis Mother    • Lung cancer Father        Social History     Social History   • Marital status:      Spouse name: N/A   • Number of children: N/A   • Years of education: N/A     Occupational History   • Not on file.     Social History Main Topics   • Smoking status: Never Smoker   • Smokeless tobacco: Never Used   • Alcohol use No   • Drug use: No   • Sexual activity: Yes     Birth control/ protection: None     Other Topics Concern   • Not on file     Social History Narrative         Review of Systems   Constitutional: Negative.  Negative for chills, diaphoresis, fatigue, fever and weight loss.   HENT: Positive for trouble swallowing. Negative for ear pain, nosebleeds, postnasal drip, rhinorrhea, sinus pressure, sneezing and sore throat.    Eyes: Negative.  Negative for redness and itching.   Respiratory: Positive for shortness of breath. Negative for cough and wheezing.         Reflux   Cardiovascular: Negative.  Negative for chest pain and palpitations.   Gastrointestinal: Positive for abdominal pain, anorexia, constipation, diarrhea, nausea and vomiting. Negative for bowel incontinence, flatus, hematochezia and melena.   Endocrine: Negative.  Negative for cold intolerance and heat intolerance.   Genitourinary: Negative.  Negative for bladder incontinence, dysuria, frequency, hematuria, pelvic pain and urgency.   Musculoskeletal: Positive for back pain. Negative for arthralgias, myalgias and neck pain.   Skin: Negative.  Negative for color change and rash.   Allergic/Immunologic: Negative.  Negative for  environmental allergies.   Neurological: Negative.  Negative for dizziness, tingling, syncope, weakness, light-headedness, numbness, headaches and paresthesias.   Hematological: Negative.  Negative for adenopathy. Does not bruise/bleed easily.   Psychiatric/Behavioral: Negative.  Negative for dysphoric mood. The patient is not nervous/anxious.         Depression is stable on medication       Objective   Physical Exam   Constitutional: She is oriented to person, place, and time. She appears well-developed.   HENT:   Head: Normocephalic.   Right Ear: External ear normal.   Left Ear: External ear normal.   Nose: Nose normal.   Mouth/Throat: Oropharynx is clear and moist.   Eyes: Conjunctivae and EOM are normal. Pupils are equal, round, and reactive to light.   Neck: Normal range of motion. Neck supple.   Cardiovascular: Normal rate and regular rhythm.    No murmur heard.  Pulmonary/Chest: Effort normal and breath sounds normal. No respiratory distress. She has no decreased breath sounds. She has no wheezes. She has no rhonchi. She has no rales. She exhibits no tenderness.   Abdominal: Soft. Bowel sounds are normal. There is tenderness.   Musculoskeletal: Normal range of motion.   Neurological: She is alert and oriented to person, place, and time.   Skin: Skin is warm and dry.   Psychiatric: She has a normal mood and affect. Her behavior is normal.   Nursing note and vitals reviewed.      Assessment/Plan   Renée was seen today for med management.    Diagnoses and all orders for this visit:    Need for influenza vaccination  -     Flu Vaccine Quad PF 3YR+ (FLUARIX/FLUZONE [id number])    Depression, unspecified depression type  -     desvenlafaxine (PRISTIQ) 100 MG 24 hr tablet; Take 1 tablet by mouth Daily.    Epigastric pain    Chronic midline thoracic back pain    Insomnia, unspecified type  -     zolpidem (AMBIEN) 10 MG tablet; Take 1 tablet by mouth At Night As Needed for Sleep.      Trial of nerve pain compounding  gel from [facility] pharmacy    Keep appointment with GI clinic.         Current Outpatient Prescriptions:   •  ALPHA LIPOIC ACID PO, Take 400 mg by mouth Daily., Disp: , Rfl:   •  B Complex Vitamins (VITAMIN B COMPLEX PO), Take 1 tablet by mouth Daily., Disp: , Rfl:   •  Biotin 21069 MCG tablet, Take 1 tablet by mouth Daily., Disp: , Rfl:   •  Cholecalciferol (VITAMIN D3) 5000 units tablet tablet, Take 5,000 Units by mouth Daily., Disp: , Rfl:   •  coenzyme Q10 50 MG capsule capsule, Take 50 mg by mouth Daily., Disp: , Rfl:   •  glucagon (GLUCAGON EMERGENCY) 1 MG injection, Inject 1 mg under the skin 1 (one) time as needed for low blood sugar for up to 1 dose., Disp: 1 kit, Rfl: 12  •  glucose blood test strip, Used to test blood sugar 4 times daily for diabetes. E10.9, Disp: 150 each, Rfl: 12  •  insulin lispro (HumaLOG) 100 UNIT/ML patient supplied pump, Inject  under the skin Continuous. 1 unit per 25, Disp: , Rfl:   •  MAGNESIUM GLYCINATE PLUS PO, Take 1 tablet by mouth Daily., Disp: , Rfl:   •  magnesium oxide (MAGOX) 400 (241.3 MG) MG tablet tablet, Take 400 mg by mouth daily., Disp: , Rfl:   •  Medium Chain Triglycerides (MCT OIL PO), Take 15 mL by mouth Daily., Disp: , Rfl:   •  Multiple Vitamins-Minerals (AQUADEKS PO), Take  by mouth., Disp: , Rfl:   •  NON FORMULARY, 340 mg Daily. l-cartinine fumerate, Disp: , Rfl:   •  NON FORMULARY, 2 g 3 (Three) Times a Day. polyglycoplex protein, Disp: , Rfl:   •  ondansetron (ZOFRAN) 4 MG tablet, TAKE ONE TABLET BY MOUTH EVERY 8 HOURS FOR NAUSEA AND VOMITING, Disp: 60 tablet, Rfl: 4  •  pancrelipase, Lip-Prot-Amyl, (CREON) 45931 units capsule delayed-release particles capsule, Take 3 tablets tid with meals and 1-2 with snacks, Disp: 360 capsule, Rfl: 5  •  pantoprazole (PROTONIX) 40 MG EC tablet, Take 1 tablet by mouth Daily. Take first thing in the morning on an empty stomach.  Wait at least 30 min to 1hr before eating., Disp: 30 tablet, Rfl: 2  •   Probiotic Product (PROBIOTIC DAILY PO), Take  by mouth., Disp: , Rfl:   •  vitamin C (ASCORBIC ACID) 500 MG tablet, Take 500 mg by mouth Daily., Disp: , Rfl:   •  vitamin D (ERGOCALCIFEROL) 10045 UNITS capsule capsule, TAKE ONE CAPSULE BY MOUTH EVERY 7 DAYS, Disp: 4 capsule, Rfl: 0  •  desvenlafaxine (PRISTIQ) 100 MG 24 hr tablet, Take 1 tablet by mouth Daily., Disp: 30 tablet, Rfl: 4  •  zolpidem (AMBIEN) 10 MG tablet, Take 1 tablet by mouth At Night As Needed for Sleep., Disp: 30 tablet, Rfl: 2    Return in about 3 months (around 2/20/2018) for Recheck.

## 2017-11-28 RX ORDER — SUCRALFATE 1 G/10ML
SUSPENSION ORAL
Qty: 250 ML | Refills: 0 | Status: SHIPPED | OUTPATIENT
Start: 2017-11-28 | End: 2017-12-22 | Stop reason: HOSPADM

## 2017-12-12 RX ORDER — IBUPROFEN 600 MG/1
TABLET ORAL
Qty: 1 KIT | Refills: 11 | Status: SHIPPED | OUTPATIENT
Start: 2017-12-12 | End: 2019-05-22 | Stop reason: SDUPTHER

## 2017-12-22 ENCOUNTER — OFFICE VISIT (OUTPATIENT)
Dept: GASTROENTEROLOGY | Facility: CLINIC | Age: 57
End: 2017-12-22

## 2017-12-22 VITALS
WEIGHT: 148.6 LBS | DIASTOLIC BLOOD PRESSURE: 80 MMHG | TEMPERATURE: 97.2 F | BODY MASS INDEX: 24.76 KG/M2 | OXYGEN SATURATION: 98 % | SYSTOLIC BLOOD PRESSURE: 120 MMHG | HEIGHT: 65 IN | HEART RATE: 87 BPM

## 2017-12-22 DIAGNOSIS — K21.00 REFLUX ESOPHAGITIS: Primary | ICD-10-CM

## 2017-12-22 PROCEDURE — 99213 OFFICE O/P EST LOW 20 MIN: CPT | Performed by: NURSE PRACTITIONER

## 2017-12-22 NOTE — PROGRESS NOTES
OUTPATIENT ESTABLISHED PATIENT NOTE  Patient: JANI JIMENES : 1960  Date of Service: 2017  CC: Follow-up    Assessment/Plan                                             ASSESSMENT & PLANS     Jani was seen today for follow-up.    Diagnoses and all orders for this visit:    Reflux esophagitis    continue current medication    Follow Up:Return if symptoms worsen or fail to improve, for Recheck.      DISCUSSION: The above plan was delineated in details with patient and all questions and concerns were answered.  Patient is also given contact information.  Patient is to return as scheduled or sooner if new problems arise.   Subjective                                                     SUBJECTIVE   History of Present Illness  Ms. Jani Jimenes is a 57 y.o. female is here for Follow-up  EGD, done on 10/25/17 by Dr. 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  No ulcers. Moderate shop at the beginning at the gastric anastomosis without overt ulceration.  Copious mucosal hematuria in the stomach.  ----  Tried carafate w/o sx improvement  Reflux stomach acid and bile     Xifaxan and Creon are not covered by Medicare    CT done at [facility] in Spring 2017.  We don't have the result yet  C and D    Pt used to take 5 Creon before meal w/ some, but not much sx improvement.       ROS:Review of Systems   Constitutional: Positive for fatigue.        Pt currently or recently takes NSAIDS ( (i.e Ibuprofen, Aleve, Advil, Exedrin, BC Powder, diclofenac, meloxicam, & Naproxen, etc)? No    Pt currently or recently takes abx? No   HENT: Negative.    Eyes: Negative.    Respiratory: Negative.    Cardiovascular: Negative.    Gastrointestinal: Positive for abdominal distention, abdominal pain, constipation, diarrhea, nausea and vomiting.   Endocrine: Negative.    Genitourinary: Negative.    Musculoskeletal: Negative.    Skin: Negative.     Allergic/Immunologic: Negative.    Neurological: Negative.    Hematological: Negative.    Psychiatric/Behavioral: Negative.      Objective                                                           OBJECTIVE   Allergy: Pt is allergic to aspirin; phenergan [promethazine hcl]; and reglan [metoclopramide].  MEDS: •  ALPHA LIPOIC ACID PO, Take 400 mg by mouth Daily., Disp: , Rfl:   •  B Complex Vitamins (VITAMIN B COMPLEX PO), Take 1 tablet by mouth Daily., Disp: , Rfl:   •  Biotin 22420 MCG tablet, Take 1 tablet by mouth Daily., Disp: , Rfl:   •  coenzyme Q10 50 MG capsule capsule, Take 50 mg by mouth Daily., Disp: , Rfl:   •  desvenlafaxine (PRISTIQ) 100 MG 24 hr tablet, Take 1 tablet by mouth Daily., Disp: 30 tablet, Rfl: 4  •  GLUCAGON EMERGENCY 1 MG injection, INECT 1 MG UNDER THE SKIN ONE TIME AS NEEDED FOR LOW BLOOD SUGAR FOR UP TO 1 DOSE, Disp: 1 kit, Rfl: 11  •  glucose blood test strip, Used to test blood sugar 4 times daily for diabetes. E10.9, Disp: 150 each, Rfl: 12  •  insulin lispro (HumaLOG) 100 UNIT/ML patient supplied pump, Inject  under the skin Continuous. 1 unit per 25, Disp: , Rfl:   •  MAGNESIUM GLYCINATE PLUS PO, Take 1 tablet by mouth Daily., Disp: , Rfl:   •  Medium Chain Triglycerides (MCT OIL PO), Take 15 mL by mouth Daily., Disp: , Rfl:   •  Multiple Vitamins-Minerals (AQUADEKS PO), Take  by mouth., Disp: , Rfl:   •  NON FORMULARY, 340 mg Daily. l-cartinine fumerate, Disp: , Rfl:   •  NON FORMULARY, 2 g 3 (Three) Times a Day. polyglycoplex protein, Disp: , Rfl:   •  ondansetron (ZOFRAN) 4 MG tablet, TAKE ONE TABLET BY MOUTH EVERY 8 HOURS FOR NAUSEA AND VOMITING, Disp: 60 tablet, Rfl: 4  •  pancrelipase, Lip-Prot-Amyl, (CREON) 30464 units capsule delayed-release particles capsule, Take 3 tablets tid with meals and 1-2 with snacks, Disp: 360 capsule, Rfl: 5  •  pantoprazole (PROTONIX) 40 MG EC tablet, Take 1 tablet by mouth Daily. Take first thing in the morning on an empty stomach.  Wait at  least 30 min to 1hr before eating., Disp: 30 tablet, Rfl: 2  •  Probiotic Product (PROBIOTIC DAILY PO), Take  by mouth., Disp: , Rfl:   •  vitamin C (ASCORBIC ACID) 500 MG tablet, Take 500 mg by mouth Daily., Disp: , Rfl:   •  vitamin D (ERGOCALCIFEROL) 25361 UNITS capsule capsule, TAKE ONE CAPSULE BY MOUTH EVERY 7 DAYS, Disp: 4 capsule, Rfl: 0  •  zolpidem (AMBIEN) 10 MG tablet, Take 1 tablet by mouth At Night As Needed for Sleep., Disp: 30 tablet, Rfl: 2  Pathology  Lab Results   Component Value Date    FINALDX  10/25/2017      1. SMALL BOWEL BIOPSY:  No histopathologic abnormality, small bowel.   2. GASTRIC BIOPSY:  Reactive gastropathic changes and mild chronic gastritis.  Special stains for H.pylori are negative for organisms.    3. DISTAL ESOPHAGEAL BIOPSY:  Changes compatible with reflux esophagitis with slight increase in eosinophils.  See micro.   4. MID-ESOPHAGEAL BIOPSY:  Esophagitis with mild eosinophilia.  DGD/dlb      MICRO  10/25/2017     Sections of the small bowel show normal villi without blunting or broadening and the lamina propria is not expanded by lymphoplasmacytic infiltrates.  No inflammation, erosion or ulceration is seen in the mucosal surface.  Parasitic organisms are not noted on the mucosal surface.      Sections of the small bowel show multiple pieces of the gastric biopsy, one of which shows a band-like chronic inflammatory infiltrate of mild degree with one of the other fragments showing reactive/regenerative atypia in the epithelium of the gastric pits. No definite area of clear ulceration or erosion is seen.  Special stains for H. pylori are negative for organisms.     Sections of the detached fragment of the squamous epithelium from the esophagus show acanthosis and increased numbers of small capillaries per unit area arising relatively high in the epithelium.  There is basal regenerative hyperplasia.  The inflammatory infiltrate in the intraepithelial spaces is comprised of  mononuclear inflammatory cells and eosinophils which average from 5 to 10 per high-powered field.  No viral cytopathic changes or fungal elements are seen.  No evidence of goblet cell intestinal metaplasia or dysplasia is noted.    Sections of the mid-esophageal biopsy show changes not that dissimilar to those seen in the distal esophageal biopsy.  There is slight acanthosis of the epithelium and increased numbers of capillaries arising high in the epithelium.  Inflammatory elements are noted in the intraepithelial spaces and these include small numbers of eosinophils with the most affected areas showing eight EO's in one high-powered field.       Co-morbidities   DM  Lab Results   Component Value Date    HGBA1C 6.30 (H) 05/11/2017    HGBA1C 7.30 (H) 12/20/2016      Thyroid/Calcium Panel (Normal TSH: 0.350 - 5.350 mIU/mL)  Lab Results   Component Value Date    TSH 2.357 05/11/2017    TSH 2.247 01/13/2017    TSH 2.861 12/20/2016    FREET4 1.01 01/13/2017    FREET4 1.00 12/20/2016    PTH 36 01/13/2017    CALCIUM 10.6 (H) 05/11/2017    CALCIUM 10.5 (H) 12/20/2016    CAION 1.34 (H) 01/13/2017     HLP  Lab Results   Component Value Date    TRIG 273 (H) 05/11/2017    TRIG 283 (H) 12/20/2016    HDL 72 (H) 05/11/2017    HDL 75 (H) 12/20/2016    LDLDIRECT 150 (H) 05/11/2017    LDLDIRECT 178 (H) 12/20/2016   Per outside medical record, lab work done on 8/23/17 at [facility] in [place]  AST 27 ALT 29 alkaline phosphatase 152 total bili 0.2 albumin 4.7 total protein 8.3 serum creatinine 0.7 BUN 16   Hemoglobin A1c 7.0  Triglycerides elevated at 265 total cholesterol elevated at 310   Alkaline phosphatase isoenzyme with alkaline phosphatase elevated at 153.  Liver fraction normal at 39 bone fraction normal at 19 intestinal fraction elevated at 41 (normal is at 0-18%)  Ferritin 122  Iron 81  TIBC 351  Iron saturation 23%  Liver kidney microsomal antibody less than 1.0  Celiac disease compared to panel  negative  Mitochondrial antibody less than 20.0  Anti-smooth muscle antibody 9  TO negative  Alpha-1 antitrypsin phenotype  Hepatitis B surface antibody negative  Hepatitis A IgM negative  Lab Results   Component Value Date    WBC 12.64 (H) 01/13/2017    HGB 13.0 01/13/2017    HCT 39.5 01/13/2017    MCV 90.6 01/13/2017    MCHC 32.9 01/13/2017     (H) 01/13/2017     Lab Results   Component Value Date     05/11/2017    K 4.6 05/11/2017    CL 99 05/11/2017    CO2 36.0 (H) 05/11/2017    BUN 15 05/11/2017    CREATININE 0.70 05/11/2017    GLUCOSE 116 (H) 05/11/2017    CALCIUM 10.6 (H) 05/11/2017    ANIONGAP 4.0 05/11/2017     Lab Results   Component Value Date    AST 33 05/11/2017    AST 32 12/20/2016    ALT 34 05/11/2017    ALT 27 12/20/2016    ALKPHOS 183 (H) 05/11/2017    ALKPHOS 160 (H) 12/20/2016    BILITOT 0.2 (L) 05/11/2017    ALBUMIN 4.90 (H) 05/11/2017     Lab Results   Component Value Date    HGBA1C 6.30 (H) 05/11/2017    HGBA1C 7.30 (H) 12/20/2016     No results found for: HEPAIGM, HEPBSAG, HEPBIGMCORE, HEPCAB, HEPCVIRUSABY  Wt Readings from Last 5 Encounters:   12/22/17 67.4 kg (148 lb 9.6 oz)   11/20/17 67.4 kg (148 lb 9.6 oz)   08/28/17 66.3 kg (146 lb 3.2 oz)   08/21/17 67.1 kg (148 lb)   05/11/17 64.1 kg (141 lb 6.4 oz)   body mass index is 24.73 kg/(m^2).,Temp: 97.2 °F (36.2 °C),BP: 120/80,Heart Rate: 87   Physical Exam  General Well developed; well nourished; no acute distress.   ENT Oral mucosa pink and moist without thrush or lesions.    GI Abd soft, NT, ND, normal active bowel sounds.  No HSM.  No abd hernia    Pt care team: Kati PAREKH & PEGGY Sprague  [facility]--Gastroenterology  [phone]    CC: Dr.Wanda NAJMA Torres MD   [address] [address] / [place] [id number] FAX:[phone]

## 2018-01-03 DIAGNOSIS — Z90.410 POST-PANCREATECTOMY DIABETES (HCC): ICD-10-CM

## 2018-01-03 DIAGNOSIS — E13.9 POST-PANCREATECTOMY DIABETES (HCC): ICD-10-CM

## 2018-01-03 DIAGNOSIS — E89.1 POST-PANCREATECTOMY DIABETES (HCC): ICD-10-CM

## 2018-01-03 RX ORDER — PANCRELIPASE 24000; 76000; 120000 [USP'U]/1; [USP'U]/1; [USP'U]/1
CAPSULE, DELAYED RELEASE PELLETS ORAL
Qty: 360 CAPSULE | Refills: 4 | Status: SHIPPED | OUTPATIENT
Start: 2018-01-03 | End: 2019-09-11 | Stop reason: SDUPTHER

## 2018-02-20 ENCOUNTER — OFFICE VISIT (OUTPATIENT)
Dept: INTERNAL MEDICINE | Facility: CLINIC | Age: 58
End: 2018-02-20

## 2018-02-20 VITALS
WEIGHT: 143.8 LBS | OXYGEN SATURATION: 96 % | HEART RATE: 85 BPM | DIASTOLIC BLOOD PRESSURE: 66 MMHG | BODY MASS INDEX: 23.93 KG/M2 | SYSTOLIC BLOOD PRESSURE: 112 MMHG | TEMPERATURE: 97.8 F

## 2018-02-20 DIAGNOSIS — D50.9 IRON DEFICIENCY ANEMIA, UNSPECIFIED IRON DEFICIENCY ANEMIA TYPE: ICD-10-CM

## 2018-02-20 DIAGNOSIS — Z79.899 ENCOUNTER FOR LONG-TERM CURRENT USE OF MEDICATION: ICD-10-CM

## 2018-02-20 DIAGNOSIS — Z90.81 STATUS POST SPLENECTOMY: ICD-10-CM

## 2018-02-20 DIAGNOSIS — K21.00 GASTROESOPHAGEAL REFLUX DISEASE WITH ESOPHAGITIS: ICD-10-CM

## 2018-02-20 DIAGNOSIS — F32.A DEPRESSION, UNSPECIFIED DEPRESSION TYPE: ICD-10-CM

## 2018-02-20 DIAGNOSIS — K31.84 GASTROPARESIS: ICD-10-CM

## 2018-02-20 DIAGNOSIS — E78.2 MIXED HYPERLIPIDEMIA: Primary | ICD-10-CM

## 2018-02-20 DIAGNOSIS — K21.9 GASTROESOPHAGEAL REFLUX DISEASE, ESOPHAGITIS PRESENCE NOT SPECIFIED: ICD-10-CM

## 2018-02-20 DIAGNOSIS — C92.91 MYELOID LEUKEMIA IN REMISSION, UNSPECIFIED MYELOID LEUKEMIA TYPE (HCC): ICD-10-CM

## 2018-02-20 DIAGNOSIS — R94.6 THYROID FUNCTION TEST ABNORMAL: ICD-10-CM

## 2018-02-20 DIAGNOSIS — E55.9 VITAMIN D DEFICIENCY: ICD-10-CM

## 2018-02-20 DIAGNOSIS — G47.00 INSOMNIA, UNSPECIFIED TYPE: ICD-10-CM

## 2018-02-20 LAB
25(OH)D3 SERPL-MCNC: 13.3 NG/ML
ALBUMIN SERPL-MCNC: 4.6 G/DL (ref 3.2–4.8)
ALBUMIN/GLOB SERPL: 1.5 G/DL (ref 1.5–2.5)
ALP SERPL-CCNC: 162 U/L (ref 25–100)
ALT SERPL W P-5'-P-CCNC: 33 U/L (ref 7–40)
ANION GAP SERPL CALCULATED.3IONS-SCNC: 7 MMOL/L (ref 3–11)
ARTICHOKE IGE QN: 156 MG/DL (ref 0–130)
AST SERPL-CCNC: 30 U/L (ref 0–33)
BASOPHILS # BLD AUTO: 0.03 10*3/MM3 (ref 0–0.2)
BASOPHILS NFR BLD AUTO: 0.3 % (ref 0–1)
BILIRUB SERPL-MCNC: 0.4 MG/DL (ref 0.3–1.2)
BUN BLD-MCNC: 14 MG/DL (ref 9–23)
BUN/CREAT SERPL: 17.5 (ref 7–25)
CALCIUM SPEC-SCNC: 9.7 MG/DL (ref 8.7–10.4)
CHLORIDE SERPL-SCNC: 100 MMOL/L (ref 99–109)
CHOLEST SERPL-MCNC: 235 MG/DL (ref 0–200)
CO2 SERPL-SCNC: 31 MMOL/L (ref 20–31)
CREAT BLD-MCNC: 0.8 MG/DL (ref 0.6–1.3)
DEPRECATED RDW RBC AUTO: 46.4 FL (ref 37–54)
EOSINOPHIL # BLD AUTO: 0.37 10*3/MM3 (ref 0–0.3)
EOSINOPHIL NFR BLD AUTO: 3.3 % (ref 0–3)
ERYTHROCYTE [DISTWIDTH] IN BLOOD BY AUTOMATED COUNT: 13.7 % (ref 11.3–14.5)
FERRITIN SERPL-MCNC: 64 NG/ML (ref 10–291)
FOLATE SERPL-MCNC: 16.5 NG/ML (ref 3.2–20)
GFR SERPL CREATININE-BSD FRML MDRD: 73 ML/MIN/1.73
GLOBULIN UR ELPH-MCNC: 3 GM/DL
GLUCOSE BLD-MCNC: 164 MG/DL (ref 70–100)
HCT VFR BLD AUTO: 42.1 % (ref 34.5–44)
HDLC SERPL-MCNC: 64 MG/DL (ref 40–60)
HGB BLD-MCNC: 13.4 G/DL (ref 11.5–15.5)
IMM GRANULOCYTES # BLD: 0.03 10*3/MM3 (ref 0–0.03)
IMM GRANULOCYTES NFR BLD: 0.3 % (ref 0–0.6)
IRON 24H UR-MRATE: 125 MCG/DL (ref 50–175)
IRON SATN MFR SERPL: 35 % (ref 15–50)
LYMPHOCYTES # BLD AUTO: 2.88 10*3/MM3 (ref 0.6–4.8)
LYMPHOCYTES NFR BLD AUTO: 25.4 % (ref 24–44)
MCH RBC QN AUTO: 29.2 PG (ref 27–31)
MCHC RBC AUTO-ENTMCNC: 31.8 G/DL (ref 32–36)
MCV RBC AUTO: 91.7 FL (ref 80–99)
MONOCYTES # BLD AUTO: 1.67 10*3/MM3 (ref 0–1)
MONOCYTES NFR BLD AUTO: 14.8 % (ref 0–12)
NEUTROPHILS # BLD AUTO: 6.34 10*3/MM3 (ref 1.5–8.3)
NEUTROPHILS NFR BLD AUTO: 55.9 % (ref 41–71)
PLATELET # BLD AUTO: 539 10*3/MM3 (ref 150–450)
PMV BLD AUTO: 12 FL (ref 6–12)
POTASSIUM BLD-SCNC: 5.6 MMOL/L (ref 3.5–5.5)
PROT SERPL-MCNC: 7.6 G/DL (ref 5.7–8.2)
RBC # BLD AUTO: 4.59 10*6/MM3 (ref 3.89–5.14)
SODIUM BLD-SCNC: 138 MMOL/L (ref 132–146)
T4 FREE SERPL-MCNC: 0.99 NG/DL (ref 0.89–1.76)
TIBC SERPL-MCNC: 356 MCG/DL (ref 250–450)
TRIGL SERPL-MCNC: 144 MG/DL (ref 0–150)
TSH SERPL DL<=0.05 MIU/L-ACNC: 2.69 MIU/ML (ref 0.35–5.35)
VIT B12 BLD-MCNC: 542 PG/ML (ref 211–911)
WBC NRBC COR # BLD: 11.32 10*3/MM3 (ref 3.5–10.8)

## 2018-02-20 PROCEDURE — 83540 ASSAY OF IRON: CPT | Performed by: FAMILY MEDICINE

## 2018-02-20 PROCEDURE — 85025 COMPLETE CBC W/AUTO DIFF WBC: CPT | Performed by: FAMILY MEDICINE

## 2018-02-20 PROCEDURE — 84443 ASSAY THYROID STIM HORMONE: CPT | Performed by: FAMILY MEDICINE

## 2018-02-20 PROCEDURE — 84439 ASSAY OF FREE THYROXINE: CPT | Performed by: FAMILY MEDICINE

## 2018-02-20 PROCEDURE — 83550 IRON BINDING TEST: CPT | Performed by: FAMILY MEDICINE

## 2018-02-20 PROCEDURE — 80061 LIPID PANEL: CPT | Performed by: FAMILY MEDICINE

## 2018-02-20 PROCEDURE — 82607 VITAMIN B-12: CPT | Performed by: FAMILY MEDICINE

## 2018-02-20 PROCEDURE — 82306 VITAMIN D 25 HYDROXY: CPT | Performed by: FAMILY MEDICINE

## 2018-02-20 PROCEDURE — 82746 ASSAY OF FOLIC ACID SERUM: CPT | Performed by: FAMILY MEDICINE

## 2018-02-20 PROCEDURE — 82728 ASSAY OF FERRITIN: CPT | Performed by: FAMILY MEDICINE

## 2018-02-20 PROCEDURE — 80053 COMPREHEN METABOLIC PANEL: CPT | Performed by: FAMILY MEDICINE

## 2018-02-20 PROCEDURE — 99214 OFFICE O/P EST MOD 30 MIN: CPT | Performed by: FAMILY MEDICINE

## 2018-02-20 RX ORDER — ZOLPIDEM TARTRATE 10 MG/1
10 TABLET ORAL NIGHTLY PRN
Qty: 30 TABLET | Refills: 2 | Status: SHIPPED | OUTPATIENT
Start: 2018-02-20 | End: 2018-06-07 | Stop reason: SDUPTHER

## 2018-02-20 RX ORDER — PANTOPRAZOLE SODIUM 40 MG/1
40 TABLET, DELAYED RELEASE ORAL DAILY
Qty: 30 TABLET | Refills: 2 | Status: SHIPPED | OUTPATIENT
Start: 2018-02-20 | End: 2018-12-13 | Stop reason: SDUPTHER

## 2018-02-20 RX ORDER — ONDANSETRON 4 MG/1
4 TABLET, FILM COATED ORAL EVERY 8 HOURS PRN
Qty: 60 TABLET | Refills: 4 | Status: SHIPPED | OUTPATIENT
Start: 2018-02-20 | End: 2018-07-12 | Stop reason: SDUPTHER

## 2018-02-20 RX ORDER — DESVENLAFAXINE 100 MG/1
100 TABLET, EXTENDED RELEASE ORAL DAILY
Qty: 30 TABLET | Refills: 4 | Status: SHIPPED | OUTPATIENT
Start: 2018-02-20 | End: 2018-07-09 | Stop reason: SDUPTHER

## 2018-02-20 NOTE — PROGRESS NOTES
Subjective   Renée Laurent is a 57 y.o. female.     Chief Complaint   Patient presents with   • Med Management     3 month follow up       Visit Vitals   • /66   • Pulse 85   • Temp 97.8 °F (36.6 °C)   • Wt 65.2 kg (143 lb 12.8 oz)   • LMP  (LMP Unknown)   • SpO2 96%   • BMI 23.93 kg/m2       Insomnia   This is a chronic problem. The current episode started more than 1 year ago. The problem occurs constantly. The problem has been unchanged. Associated symptoms include abdominal pain, fatigue, nausea and vomiting. Pertinent negatives include no anorexia, arthralgias, change in bowel habit, chest pain, chills, congestion, coughing, diaphoresis, fever, headaches, joint swelling, myalgias, neck pain, numbness, rash, sore throat, swollen glands, urinary symptoms, vertigo, visual change or weakness. The symptoms are aggravated by stress (abdominal pain). Treatments tried: ambien. The treatment provided moderate relief.   Heartburn   She complains of abdominal pain, early satiety, globus sensation, heartburn, a hoarse voice, nausea and tooth decay. She reports no belching, no chest pain, no choking, no coughing, no dysphagia, no sore throat, no stridor, no water brash or no wheezing. This is a chronic problem. The current episode started more than 1 year ago. The problem occurs constantly. The problem has been gradually improving. The heartburn duration is more than one hour. The heartburn is located in the substernum. The heartburn is of severe intensity. The heartburn wakes her from sleep. The heartburn does not limit her activity. The heartburn changes with position. The symptoms are aggravated by lying down and certain foods. Associated symptoms include fatigue and weight loss. Pertinent negatives include no melena, muscle weakness or orthopnea. Pt changed diet to lose weight. There are no known risk factors. She has tried a PPI for the symptoms. The treatment provided moderate relief. Past invasive treatments  do not include gastroplasty, gastroplication or reflux surgery.   Depression   Visit Type: follow-up  Patient presents with the following symptoms: fatigue, insomnia, nausea and weight loss.  Patient is not experiencing: anhedonia, chest pain, choking sensation, compulsions, confusion, decreased concentration, depressed mood, dizziness, dry mouth, excessive worry, feelings of hopelessness, feelings of worthlessness, hypersomnia, hyperventilation, irritability, malaise, memory impairment, muscle tension, nervousness/anxiety, obsessions, palpitations, panic, psychomotor agitation, psychomotor retardation, restlessness, shortness of breath, suicidal ideas, suicidal planning, thoughts of death and weight gain.  Severity: mild   Sleep per night: 4 hours  Nighttime awakenings: several  Compliance with medications:  %           Pt has done some sleep eating. Discussed cutting her ambien in half.   Pt has a referral to Dr Henry in [place] who is a surgeon who runs gastroparesis/transplant program.  Pt has been having partial bowel obstructions.   Pt is on insulin pump. Sugar is doing well. Pt has changed her diet. Pt sees Endo in [place].  Her Endocrinologist did HGA1c and DM foot exam.     The following portions of the patient's history were reviewed and updated as appropriate: allergies, current medications, past family history, past medical history, past social history, past surgical history and problem list.    Past Medical History:   Diagnosis Date   • Abdominal pain    • Absence of pancreas, acquired    • Anemia    • Anxiety    • Asthma    • Bile reflux gastritis    • Chronic illness    • Contact dermatitis     due to plants   • Depression    • Diabetes mellitus    • Encounter for dental examination 07/2014   • Gastroparesis    • GERD (gastroesophageal reflux disease)    • Hyperlipidemia    • Incisional hernia    • Insomnia    • Iron deficiency    • Myeloid leukemia    • Osteopenia    • Pancreatitis     • Post-splenectomy    • Vitamin D deficiency      Past Surgical History:   Procedure Laterality Date   • ABDOMINAL SURGERY     • CERVICAL DISCECTOMY ANTERIOR     • CHOLECYSTECTOMY     • COLONOSCOPY     • GALLBLADDER SURGERY     • HERNIA MESH REMOVAL  2014    abdominal hernia with mesh    • OTHER SURGICAL HISTORY      duodeum removed   • OTHER SURGICAL HISTORY      jejunem removed   • PANCREAS SURGERY      pancreas removal   • STOMACH SURGERY      distal stomach    • TOTAL ABDOMINAL HYSTERECTOMY WITH SALPINGO OOPHORECTOMY     • TUBAL ABDOMINAL LIGATION     • UPPER GASTROINTESTINAL ENDOSCOPY  10/25/2017    Dr Mehta, gastritis, eosinphils in esophagus       Allergies   Allergen Reactions   • Aspirin Hives   • Phenergan [Promethazine Hcl]      Anxiety/relessness   • Reglan [Metoclopramide]      Involuntary movement       Family History   Problem Relation Age of Onset   • Osteoporosis Mother    • Cancer Mother      small bowel cancer   • Hashimoto's thyroiditis Mother    • Lung cancer Father        Social History     Social History   • Marital status:      Spouse name: N/A   • Number of children: N/A   • Years of education: N/A     Occupational History   • Not on file.     Social History Main Topics   • Smoking status: Never Smoker   • Smokeless tobacco: Never Used   • Alcohol use No   • Drug use: No   • Sexual activity: Yes     Birth control/ protection: None     Other Topics Concern   • Not on file     Social History Narrative       Review of Systems   Constitutional: Positive for fatigue and weight loss. Negative for chills, diaphoresis, fever, irritability and weight gain.   HENT: Positive for hoarse voice. Negative for congestion, ear pain, nosebleeds, postnasal drip, rhinorrhea, sinus pressure, sneezing and sore throat.    Eyes: Negative.  Negative for redness and itching.   Respiratory: Negative.  Negative for cough, choking, shortness of breath and wheezing.    Cardiovascular: Negative.  Negative for  chest pain and palpitations.   Gastrointestinal: Positive for abdominal pain, constipation, diarrhea, heartburn, nausea and vomiting. Negative for anorexia, change in bowel habit, dysphagia and melena.   Endocrine: Negative.  Negative for cold intolerance and heat intolerance.   Genitourinary: Negative.  Negative for dysuria, frequency, hematuria and urgency.   Musculoskeletal: Negative.  Negative for arthralgias, back pain, joint swelling, myalgias, muscle weakness and neck pain.   Skin: Negative.  Negative for color change and rash.   Allergic/Immunologic: Negative.  Negative for environmental allergies.   Neurological: Negative.  Negative for dizziness, vertigo, syncope, weakness, light-headedness, numbness and headaches.   Hematological: Negative.  Negative for adenopathy. Does not bruise/bleed easily.   Psychiatric/Behavioral: Negative for confusion, decreased concentration, dysphoric mood and suicidal ideas. The patient has insomnia. The patient is not nervous/anxious.         Depression and anxiety are stable on medication.       Objective   Physical Exam   Constitutional: She is oriented to person, place, and time. She appears well-developed.   HENT:   Head: Normocephalic.   Right Ear: Tympanic membrane, external ear and ear canal normal.   Left Ear: Tympanic membrane, external ear and ear canal normal.   Nose: Nose normal.   Mouth/Throat: Uvula is midline and oropharynx is clear and moist. No oropharyngeal exudate, posterior oropharyngeal edema or posterior oropharyngeal erythema.   Eyes: Conjunctivae and EOM are normal. Pupils are equal, round, and reactive to light.   Neck: Normal range of motion. Neck supple.   Cardiovascular: Normal rate and regular rhythm.    No murmur heard.  Pulmonary/Chest: Effort normal and breath sounds normal. No respiratory distress. She has no decreased breath sounds. She has no wheezes. She has no rhonchi. She has no rales. She exhibits no tenderness.   Abdominal: Soft. Bowel  sounds are normal. There is no hepatosplenomegaly. There is tenderness in the right upper quadrant and epigastric area.       Musculoskeletal: Normal range of motion.   Neurological: She is alert and oriented to person, place, and time.   Skin: Skin is warm and dry.   Psychiatric: She has a normal mood and affect. Her behavior is normal.   Nursing note and vitals reviewed.      Assessment/Plan   Renée was seen today for med management.    Diagnoses and all orders for this visit:    Mixed hyperlipidemia  -     Comprehensive Metabolic Panel  -     Lipid Panel    Gastroesophageal reflux disease, esophagitis presence not specified  -     pantoprazole (PROTONIX) 40 MG EC tablet; Take 1 tablet by mouth Daily. Take first thing in the morning on an empty stomach.  Wait at least 30 min to 1hr before eating.    Insomnia, unspecified type  -     zolpidem (AMBIEN) 10 MG tablet; Take 1 tablet by mouth At Night As Needed for Sleep.    Depression, unspecified depression type  -     desvenlafaxine (PRISTIQ) 100 MG 24 hr tablet; Take 1 tablet by mouth Daily.    Gastroparesis    Gastroesophageal reflux disease with esophagitis    Vitamin D deficiency  -     Vitamin D 25 Hydroxy    Myeloid leukemia in remission, unspecified myeloid leukemia type  -     CBC & Differential    Encounter for long-term current use of medication  -     Vitamin B12  -     Folate    Thyroid function test abnormal  -     TSH  -     T4, Free    Iron deficiency anemia, unspecified iron deficiency anemia type  -     CBC & Differential  -     Iron Profile  -     Ferritin    Status post splenectomy  -     CBC & Differential    Other orders  -     ondansetron (ZOFRAN) 4 MG tablet; Take 1 tablet by mouth Every 8 (Eight) Hours As Needed for Nausea or Vomiting.        Will check [facility] for Dexa report.  Discussed getting mammogram.  Discussed tetanus vaccine           Current Outpatient Prescriptions:   •  ALPHA LIPOIC ACID PO, Take 400 mg by mouth Daily.,  Disp: , Rfl:   •  B Complex Vitamins (VITAMIN B COMPLEX PO), Take 1 tablet by mouth Daily., Disp: , Rfl:   •  Biotin 78311 MCG tablet, Take 1 tablet by mouth Daily., Disp: , Rfl:   •  coenzyme Q10 50 MG capsule capsule, Take 50 mg by mouth Daily., Disp: , Rfl:   •  CREON 28368-65829 units capsule delayed-release particles capsule, TAKE 3 CAPSULES BY MOUTH THREE TIMES A DAY WITH MEALS AND 1-2 WITH SNACKS, Disp: 360 capsule, Rfl: 4  •  desvenlafaxine (PRISTIQ) 100 MG 24 hr tablet, Take 1 tablet by mouth Daily., Disp: 30 tablet, Rfl: 4  •  GLUCAGON EMERGENCY 1 MG injection, INECT 1 MG UNDER THE SKIN ONE TIME AS NEEDED FOR LOW BLOOD SUGAR FOR UP TO 1 DOSE, Disp: 1 kit, Rfl: 11  •  glucose blood test strip, Used to test blood sugar 4 times daily for diabetes. E10.9, Disp: 150 each, Rfl: 12  •  insulin lispro (HumaLOG) 100 UNIT/ML patient supplied pump, Inject  under the skin Continuous. 1 unit per 25, Disp: , Rfl:   •  MAGNESIUM GLYCINATE PLUS PO, Take 1 tablet by mouth Daily., Disp: , Rfl:   •  Medium Chain Triglycerides (MCT OIL PO), Take 15 mL by mouth Daily., Disp: , Rfl:   •  Multiple Vitamins-Minerals (AQUADEKS PO), Take  by mouth., Disp: , Rfl:   •  NON FORMULARY, 340 mg Daily. l-cartinine fumerate, Disp: , Rfl:   •  NON FORMULARY, 2 g 3 (Three) Times a Day. polyglycoplex protein, Disp: , Rfl:   •  ondansetron (ZOFRAN) 4 MG tablet, Take 1 tablet by mouth Every 8 (Eight) Hours As Needed for Nausea or Vomiting., Disp: 60 tablet, Rfl: 4  •  pancrelipase, Lip-Prot-Amyl, (CREON) 18975 units capsule delayed-release particles capsule, Take 3 tablets tid with meals and 1-2 with snacks, Disp: 360 capsule, Rfl: 5  •  pantoprazole (PROTONIX) 40 MG EC tablet, Take 1 tablet by mouth Daily. Take first thing in the morning on an empty stomach.  Wait at least 30 min to 1hr before eating., Disp: 30 tablet, Rfl: 2  •  Probiotic Product (PROBIOTIC DAILY PO), Take  by mouth., Disp: , Rfl:   •  vitamin C (ASCORBIC ACID) 500 MG tablet,  Take 500 mg by mouth Daily., Disp: , Rfl:   •  vitamin D (ERGOCALCIFEROL) 49845 UNITS capsule capsule, TAKE ONE CAPSULE BY MOUTH EVERY 7 DAYS, Disp: 4 capsule, Rfl: 0  •  zolpidem (AMBIEN) 10 MG tablet, Take 1 tablet by mouth At Night As Needed for Sleep., Disp: 30 tablet, Rfl: 2    Return in about 3 months (around 5/20/2018), or if symptoms worsen or fail to improve, for Recheck.

## 2018-02-22 ENCOUNTER — TELEPHONE (OUTPATIENT)
Dept: INTERNAL MEDICINE | Facility: CLINIC | Age: 58
End: 2018-02-22

## 2018-02-22 NOTE — TELEPHONE ENCOUNTER
Spoke with patient. She is going to look into local places that may offer the injections as well as do some research on prolia to evaluate her insurance coverage. She is planning on calling back early next week to give us a location to send her to locally or if she'd like to go another route.

## 2018-02-22 NOTE — TELEPHONE ENCOUNTER
----- Message from Karla EDDY MD sent at 2/21/2018  5:09 PM EST -----  Please call the patient regarding her abnormal result.  Osteopenia on Dexa, High fracture risk. Need to consider adding biphosphonates such as fosamax or Boniva or injectable.   With hx of stomach surgery may have problems with oral meds, need to stay upright 30 min after pills.    Please continue weight bearing exercise, vitamin D and calcium rich diet-or supplements.   Please avoid falls.   Please avoid tobacco and alcohol.    Please repeat a DEXA scan in about 2 years.

## 2018-02-22 NOTE — TELEPHONE ENCOUNTER
PATIENT CALLED BACK AND I READ INSTRUCTIONS TO PATIENT. SHE STATES SHE WOULD NEED TO DO THE INJECTIONS. CAN YOU PLEASE CALL HER BACK REGARDING THIS.

## 2018-06-07 ENCOUNTER — TELEPHONE (OUTPATIENT)
Dept: INTERNAL MEDICINE | Facility: CLINIC | Age: 58
End: 2018-06-07

## 2018-06-07 DIAGNOSIS — G47.00 INSOMNIA, UNSPECIFIED TYPE: ICD-10-CM

## 2018-06-07 NOTE — TELEPHONE ENCOUNTER
Patients apt was rescheduled from 613 until 7/6 she will need her rx's for zolpidem (AMBIEN) 10 MG tablet,pantoprazole (PROTONIX) 40 MG EC tablet,desvenlafaxine (PRISTIQ) 100 MG 24 hr tablet,ancoenzyme Q10 50 MG capsule capsuled

## 2018-06-08 DIAGNOSIS — G47.00 INSOMNIA, UNSPECIFIED TYPE: ICD-10-CM

## 2018-06-08 RX ORDER — ZOLPIDEM TARTRATE 10 MG/1
TABLET ORAL
Qty: 30 TABLET | Refills: 0 | Status: SHIPPED | OUTPATIENT
Start: 2018-06-08 | End: 2018-12-13

## 2018-06-11 ENCOUNTER — TELEPHONE (OUTPATIENT)
Dept: INTERNAL MEDICINE | Facility: CLINIC | Age: 58
End: 2018-06-11

## 2018-06-11 DIAGNOSIS — G47.00 INSOMNIA, UNSPECIFIED TYPE: ICD-10-CM

## 2018-06-11 RX ORDER — ZOLPIDEM TARTRATE 10 MG/1
TABLET ORAL
Qty: 30 TABLET | Refills: 1 | OUTPATIENT
Start: 2018-06-11

## 2018-06-11 NOTE — TELEPHONE ENCOUNTER
Patients appointment was r/s by us due to Dr Torres being out and she is needing a refill on zolpidem (AMBIEN) 10 MG tablet

## 2018-07-09 DIAGNOSIS — F32.A DEPRESSION, UNSPECIFIED DEPRESSION TYPE: ICD-10-CM

## 2018-07-09 RX ORDER — DESVENLAFAXINE 100 MG/1
100 TABLET, EXTENDED RELEASE ORAL DAILY
Qty: 30 TABLET | Refills: 0 | Status: SHIPPED | OUTPATIENT
Start: 2018-07-09 | End: 2018-08-15 | Stop reason: SDUPTHER

## 2018-07-11 ENCOUNTER — PATIENT MESSAGE (OUTPATIENT)
Dept: INTERNAL MEDICINE | Facility: CLINIC | Age: 58
End: 2018-07-11

## 2018-07-12 RX ORDER — DOXEPIN HYDROCHLORIDE 6 MG/1
TABLET ORAL
Qty: 30 TABLET | Refills: 1 | Status: SHIPPED | OUTPATIENT
Start: 2018-07-12 | End: 2018-12-13

## 2018-07-12 RX ORDER — ONDANSETRON 4 MG/1
4 TABLET, FILM COATED ORAL EVERY 8 HOURS PRN
Qty: 60 TABLET | Refills: 0 | Status: SHIPPED | OUTPATIENT
Start: 2018-07-12 | End: 2018-08-15 | Stop reason: SDUPTHER

## 2018-07-12 NOTE — TELEPHONE ENCOUNTER
Nuha Butt 7/12/2018 10:44 AM EDT        ----- Message -----  From: Renée Laurent  Sent: 7/12/2018 10:39 AM  To: Geri Oneal  Clinical Pool  Subject: RE: Non-Urgent Medical Question     Nuha,    Thanks for letting me know. Dr Torres and I talked last visit about switching me from Ambien (zolpidem) to Doxepin at a low dose of 6 or 10mg because of the negative side effects I have with Ambien. I believe Doxepin isn’t a controlled substance so can you check with her and see if she can write a script for Doxepin and send to my pharmacy? I can try it and D/c Ambien if it works.     Thanks so much for your help with this.     Renée Laurent  ----- Message -----  From: DANIEL ALMARAZ  Sent: 7/12/2018 8:48 AM EDT  To: Renée Laurent  Subject: RE: Non-Urgent Medical Question  Pristique was sent on 7/9. Zofran sent today. They were sent to [facility] in [place]. We cannot refill the Ambien until you are seen, because you need to be seen every 3 months for controls. No show letters are sent if you do not cancel 24 hours in advance.     ----- Message -----   From: Renée Laurent   Sent: 7/11/2018 8:38 PM EDT   To: Karla Torres MD  Subject: Non-Urgent Medical Question    Dr. Torres,    I received a letter from your office today that I was a “no show” for my 7/6/18 appointment with you, but I actually called and rescheduled for your next available which is in August (spoke to Rabia). I also asked her to send a message that I will need my scripts refilled before August (pristiq, zofran, zolpidem) and she said she’d send it on to your nurse.     Would someone from your office please let me know if the renewals will be done? Pristiq is the one I’m most concerned about because of its short half-life and subsequent symptoms.     Thanks so much.     Renée Laurent   [phone]  [email or web address]

## 2018-07-27 ENCOUNTER — TELEPHONE (OUTPATIENT)
Dept: INTERNAL MEDICINE | Facility: CLINIC | Age: 58
End: 2018-07-27

## 2018-07-27 NOTE — TELEPHONE ENCOUNTER
[facility] CALLED REQUESTING A PA FOR Doxepin HCl 6 MG tablet.  PLEASE CALL [phone] AND USE REFERENCE # [id number]

## 2018-08-15 ENCOUNTER — TELEPHONE (OUTPATIENT)
Dept: INTERNAL MEDICINE | Facility: CLINIC | Age: 58
End: 2018-08-15

## 2018-08-15 DIAGNOSIS — E13.9 POST-PANCREATECTOMY DIABETES (HCC): ICD-10-CM

## 2018-08-15 DIAGNOSIS — F32.A DEPRESSION, UNSPECIFIED DEPRESSION TYPE: ICD-10-CM

## 2018-08-15 DIAGNOSIS — Z90.410 POST-PANCREATECTOMY DIABETES (HCC): ICD-10-CM

## 2018-08-15 DIAGNOSIS — E89.1 POST-PANCREATECTOMY DIABETES (HCC): ICD-10-CM

## 2018-08-15 RX ORDER — ONDANSETRON 4 MG/1
4 TABLET, FILM COATED ORAL EVERY 8 HOURS PRN
Qty: 60 TABLET | Refills: 1 | Status: SHIPPED | OUTPATIENT
Start: 2018-08-15 | End: 2018-12-13 | Stop reason: SDUPTHER

## 2018-08-15 RX ORDER — DESVENLAFAXINE 100 MG/1
100 TABLET, EXTENDED RELEASE ORAL DAILY
Qty: 30 TABLET | Refills: 1 | Status: SHIPPED | OUTPATIENT
Start: 2018-08-15 | End: 2018-11-27 | Stop reason: SDUPTHER

## 2018-08-15 NOTE — TELEPHONE ENCOUNTER
MS JIMENES IS GOING TO RUN OUT OF RX BEFORE SHE CAN BE SEEN ON 9/5/2018, SHE WILL NEED     REFILL: GABRIELLA STARKS ZOLFRAN

## 2018-09-19 ENCOUNTER — TELEPHONE (OUTPATIENT)
Dept: INTERNAL MEDICINE | Facility: CLINIC | Age: 58
End: 2018-09-19

## 2018-09-19 NOTE — TELEPHONE ENCOUNTER
Attempted to contact pt in regards to coordinating Initial Medicare Wellness Visit with Trena PAREKH 10/2/18 @ 10:45am to coincide with next routine f/u as  scheduled 10/2/18 with Dr. Torres. Left voicemail with contact information and requested a return call.

## 2018-10-02 ENCOUNTER — TELEPHONE (OUTPATIENT)
Dept: INTERNAL MEDICINE | Facility: CLINIC | Age: 58
End: 2018-10-02

## 2018-10-02 NOTE — TELEPHONE ENCOUNTER
Called pt in regards to adding Initial Medicare Wellness visit to next routine appt as scheduled with Dr. Torres 10/8/18 12:45pm. Trena Renner has availability 12:15 to complete wellness exam. Left voicemail with my contact information and requested a return call.

## 2018-11-21 DIAGNOSIS — F32.A DEPRESSION, UNSPECIFIED DEPRESSION TYPE: ICD-10-CM

## 2018-11-21 RX ORDER — DESVENLAFAXINE 100 MG/1
TABLET, EXTENDED RELEASE ORAL
Qty: 30 TABLET | Refills: 3 | OUTPATIENT
Start: 2018-11-21

## 2018-11-27 ENCOUNTER — TELEPHONE (OUTPATIENT)
Dept: INTERNAL MEDICINE | Facility: CLINIC | Age: 58
End: 2018-11-27

## 2018-11-27 DIAGNOSIS — F32.A DEPRESSION, UNSPECIFIED DEPRESSION TYPE: ICD-10-CM

## 2018-11-27 RX ORDER — DESVENLAFAXINE 100 MG/1
100 TABLET, EXTENDED RELEASE ORAL DAILY
Qty: 30 TABLET | Refills: 0 | Status: SHIPPED | OUTPATIENT
Start: 2018-11-27 | End: 2018-12-13 | Stop reason: SDUPTHER

## 2018-11-27 NOTE — TELEPHONE ENCOUNTER
pristiq sent to [facility].  Is there a doctor there who can take over care?   Pt has not been here since February. She has been to hospital there and seen Harpreet bravos

## 2018-11-27 NOTE — TELEPHONE ENCOUNTER
Pt has been a no show and canceled several times. Spoke with her today and she states due to her health and living 2 hours away she isn't able to drive herself. She has someone that could but teachers through the day and can only bring her in the middle of December. Advised that I would ask if she could receive a week's worth until she can come in? She has symptoms of a cold and will see Trena Renner for that and per Trena she will write Pristiq until she comes in to see you for a regular f/u. Please advise.

## 2018-11-29 NOTE — TELEPHONE ENCOUNTER
Pt notified and she wants to titrate to get off of the pristiq. She will try to find dr to take over care.

## 2018-12-13 ENCOUNTER — OFFICE VISIT (OUTPATIENT)
Dept: INTERNAL MEDICINE | Facility: CLINIC | Age: 58
End: 2018-12-13

## 2018-12-13 VITALS
WEIGHT: 142.5 LBS | DIASTOLIC BLOOD PRESSURE: 72 MMHG | SYSTOLIC BLOOD PRESSURE: 132 MMHG | BODY MASS INDEX: 23.74 KG/M2 | HEART RATE: 81 BPM | TEMPERATURE: 97.9 F | HEIGHT: 65 IN | OXYGEN SATURATION: 97 %

## 2018-12-13 DIAGNOSIS — R11.0 NAUSEA: ICD-10-CM

## 2018-12-13 DIAGNOSIS — K21.9 GASTROESOPHAGEAL REFLUX DISEASE, ESOPHAGITIS PRESENCE NOT SPECIFIED: ICD-10-CM

## 2018-12-13 DIAGNOSIS — E10.9 TYPE 1 DIABETES MELLITUS WITHOUT COMPLICATIONS (HCC): ICD-10-CM

## 2018-12-13 DIAGNOSIS — Z79.899 ENCOUNTER FOR LONG-TERM (CURRENT) USE OF MEDICATIONS: ICD-10-CM

## 2018-12-13 DIAGNOSIS — F32.A DEPRESSION, UNSPECIFIED DEPRESSION TYPE: ICD-10-CM

## 2018-12-13 DIAGNOSIS — Z23 NEED FOR IMMUNIZATION AGAINST INFLUENZA: ICD-10-CM

## 2018-12-13 DIAGNOSIS — E13.9 POST-PANCREATECTOMY DIABETES (HCC): ICD-10-CM

## 2018-12-13 DIAGNOSIS — E89.1 POST-PANCREATECTOMY DIABETES (HCC): ICD-10-CM

## 2018-12-13 DIAGNOSIS — F43.21 ADJUSTMENT DISORDER WITH DEPRESSED MOOD: ICD-10-CM

## 2018-12-13 DIAGNOSIS — R63.0 POOR APPETITE: ICD-10-CM

## 2018-12-13 DIAGNOSIS — K31.84 GASTROPARESIS: Primary | ICD-10-CM

## 2018-12-13 DIAGNOSIS — Z90.410 ACQUIRED TOTAL ABSENCE OF PANCREAS: ICD-10-CM

## 2018-12-13 DIAGNOSIS — Z90.410 POST-PANCREATECTOMY DIABETES (HCC): ICD-10-CM

## 2018-12-13 LAB
AMPHET+METHAMPHET UR QL: NEGATIVE
AMPHETAMINES UR QL: NEGATIVE
BARBITURATES UR QL SCN: NEGATIVE
BENZODIAZ UR QL SCN: NEGATIVE
BUPRENORPHINE SERPL-MCNC: NEGATIVE NG/ML
CANNABINOIDS SERPL QL: NEGATIVE
COCAINE UR QL: NEGATIVE
METHADONE UR QL SCN: NEGATIVE
OPIATES UR QL: NEGATIVE
OXYCODONE UR QL SCN: NEGATIVE
PCP UR QL SCN: NEGATIVE
PROPOXYPH UR QL: NEGATIVE
TRICYCLICS UR QL SCN: NEGATIVE

## 2018-12-13 PROCEDURE — 80306 DRUG TEST PRSMV INSTRMNT: CPT | Performed by: FAMILY MEDICINE

## 2018-12-13 PROCEDURE — G0008 ADMIN INFLUENZA VIRUS VAC: HCPCS | Performed by: FAMILY MEDICINE

## 2018-12-13 PROCEDURE — 90674 CCIIV4 VAC NO PRSV 0.5 ML IM: CPT | Performed by: FAMILY MEDICINE

## 2018-12-13 PROCEDURE — 99214 OFFICE O/P EST MOD 30 MIN: CPT | Performed by: FAMILY MEDICINE

## 2018-12-13 RX ORDER — ONDANSETRON 4 MG/1
4 TABLET, FILM COATED ORAL EVERY 8 HOURS PRN
Qty: 60 TABLET | Refills: 1 | Status: SHIPPED | OUTPATIENT
Start: 2018-12-13 | End: 2019-05-22 | Stop reason: SDUPTHER

## 2018-12-13 RX ORDER — PANTOPRAZOLE SODIUM 40 MG/1
40 TABLET, DELAYED RELEASE ORAL DAILY
Qty: 30 TABLET | Refills: 5 | Status: SHIPPED | OUTPATIENT
Start: 2018-12-13 | End: 2019-05-22 | Stop reason: SDUPTHER

## 2018-12-13 RX ORDER — DRONABINOL 2.5 MG/1
2.5 CAPSULE ORAL
Qty: 60 CAPSULE | Refills: 2 | Status: SHIPPED | OUTPATIENT
Start: 2018-12-13 | End: 2019-01-02 | Stop reason: DRUGHIGH

## 2018-12-13 RX ORDER — DESVENLAFAXINE 100 MG/1
100 TABLET, EXTENDED RELEASE ORAL DAILY
Qty: 30 TABLET | Refills: 5 | Status: SHIPPED | OUTPATIENT
Start: 2018-12-13 | End: 2019-05-22 | Stop reason: SDUPTHER

## 2018-12-13 NOTE — PROGRESS NOTES
"Subjective   Renée Laurent is a 58 y.o. female.     Chief Complaint   Patient presents with   • Diabetes     f/u sees endo for this but would like to have a flu shot she is having surgery on monday but wanted to double check if it was ok to receive the shot.   • Hyperlipidemia   • Anxiety       Visit Vitals  /72 (BP Location: Left arm)   Pulse 81   Temp 97.9 °F (36.6 °C)   Ht 165.1 cm (65\")   Wt 64.6 kg (142 lb 8 oz)   LMP  (LMP Unknown)   SpO2 97%   BMI 23.71 kg/m²         GI Problem   The primary symptoms include weight loss, fatigue, abdominal pain, nausea and vomiting. Primary symptoms do not include fever, diarrhea, melena, hematemesis, jaundice, hematochezia, dysuria, myalgias, arthralgias or rash. Episode onset: greater than 1 year. The problem has not changed since onset.  The illness is also significant for anorexia, bloating and constipation. The illness does not include chills, dysphagia, odynophagia, tenesmus, back pain or itching. Significant associated medical issues include GERD. Associated medical issues do not include inflammatory bowel disease, gallstones, liver disease, alcohol abuse, PUD, gastric bypass, bowel resection, irritable bowel syndrome, hemorrhoids or diverticulitis. Associated medical issues comments: gastroparesis. Risk factors: type 1 DM, pancreatectomy.   Depression   Visit Type: follow-up  Patient presents with the following symptoms: insomnia, nausea and weight loss.  Patient is not experiencing: anhedonia, chest pain, choking sensation, compulsions, confusion, decreased concentration, depressed mood, dizziness, dry mouth, excessive worry, fatigue, feelings of hopelessness, feelings of worthlessness, hypersomnia, hyperventilation, irritability, malaise, memory impairment, muscle tension, nervousness/anxiety, obsessions, palpitations, panic, psychomotor agitation, psychomotor retardation, restlessness, shortness of breath, suicidal ideas, suicidal planning, thoughts of " death and weight gain.  Frequency of symptoms: occasionally   Severity: mild   Sleep quality: fair  Nighttime awakenings: several  Compliance with medications:  %           Pt is having surgery next week.  Pt has a lot of gastric nerve pain.  Pt has gastroparesis and will have pacemaker to help her stomach empty.  Pt asks about marinol. Pt has nausea and some vomiting.   Pt has tried THC in places where it is legal and it has helped with the nausea and vomiting.  Pt has been in [facility] in [place] since last seen.     Pt would like a flu shot.  Pt would like to wean off of pristiq after surgery.     Pt sees Dr Clark for Endocrine in [place].   Pt is seeing GI in [place] for her gastroparesis and post pancreatectomy.   HGA1c was 7 on 8/10/18      The following portions of the patient's history were reviewed and updated as appropriate: allergies, current medications, past family history, past medical history, past social history, past surgical history and problem list.    Past Medical History:   Diagnosis Date   • Abdominal pain    • Absence of pancreas, acquired    • Anemia    • Anxiety    • Asthma    • Bile reflux gastritis    • Chronic illness    • Contact dermatitis     due to plants   • Depression    • Diabetes mellitus (CMS/HCC)    • Encounter for dental examination 07/2014   • Gastroparesis    • GERD (gastroesophageal reflux disease)    • Hyperlipidemia    • Incisional hernia    • Insomnia    • Iron deficiency    • Myeloid leukemia (CMS/HCC)    • Osteopenia    • Pancreatitis    • Post-splenectomy    • Vitamin D deficiency       Past Surgical History:   Procedure Laterality Date   • ABDOMINAL SURGERY     • CERVICAL DISCECTOMY ANTERIOR     • CHOLECYSTECTOMY     • COLONOSCOPY     • GALLBLADDER SURGERY     • HERNIA MESH REMOVAL  2014    abdominal hernia with mesh    • OTHER SURGICAL HISTORY      duodeum removed   • OTHER SURGICAL HISTORY      jejunem removed   • PANCREAS SURGERY       pancreas removal   • STOMACH SURGERY      distal stomach    • TOTAL ABDOMINAL HYSTERECTOMY WITH SALPINGO OOPHORECTOMY     • TUBAL ABDOMINAL LIGATION     • UPPER GASTROINTESTINAL ENDOSCOPY  10/25/2017    Dr Mehta, gastritis, eosinphils in esophagus      Family History   Problem Relation Age of Onset   • Osteoporosis Mother    • Cancer Mother         small bowel cancer   • Hashimoto's thyroiditis Mother    • Lung cancer Father       Social History     Socioeconomic History   • Marital status:      Spouse name: Not on file   • Number of children: Not on file   • Years of education: Not on file   • Highest education level: Not on file   Social Needs   • Financial resource strain: Not on file   • Food insecurity - worry: Not on file   • Food insecurity - inability: Not on file   • Transportation needs - medical: Not on file   • Transportation needs - non-medical: Not on file   Occupational History   • Not on file   Tobacco Use   • Smoking status: Never Smoker   • Smokeless tobacco: Never Used   Substance and Sexual Activity   • Alcohol use: No   • Drug use: No   • Sexual activity: Yes     Birth control/protection: None   Other Topics Concern   • Not on file   Social History Narrative   • Not on file        Review of Systems   Constitutional: Positive for appetite change, fatigue and weight loss. Negative for chills, diaphoresis, fever, irritability and weight gain.   HENT: Negative.  Negative for ear pain, nosebleeds, postnasal drip, rhinorrhea, sinus pressure, sneezing and sore throat.    Eyes: Negative.  Negative for redness and itching.   Respiratory: Negative.  Negative for cough, choking, shortness of breath and wheezing.    Cardiovascular: Negative.  Negative for chest pain and palpitations.   Gastrointestinal: Positive for abdominal distention, abdominal pain, anorexia, bloating, constipation, nausea and vomiting. Negative for diarrhea, dysphagia, hematemesis, hematochezia, jaundice and melena.    Endocrine: Negative.  Negative for cold intolerance and heat intolerance.   Genitourinary: Negative.  Negative for dysuria, frequency, hematuria and urgency.   Musculoskeletal: Negative.  Negative for arthralgias, back pain, myalgias and neck pain.   Skin: Negative.  Negative for color change, itching and rash.   Allergic/Immunologic: Negative.  Negative for environmental allergies.   Neurological: Negative.  Negative for dizziness, syncope, light-headedness and headaches.   Hematological: Negative.  Negative for adenopathy. Does not bruise/bleed easily.   Psychiatric/Behavioral: Negative for confusion, decreased concentration, dysphoric mood and suicidal ideas. The patient has insomnia. The patient is not nervous/anxious.         Depression stable on pristiq.       Objective   Physical Exam   Constitutional: She is oriented to person, place, and time. She appears well-developed.   HENT:   Head: Normocephalic.   Right Ear: External ear normal.   Left Ear: External ear normal.   Nose: Nose normal.   Eyes: Conjunctivae, EOM and lids are normal. Pupils are equal, round, and reactive to light.   Neck: Trachea normal and normal range of motion. Neck supple. Carotid bruit is not present. No thyroid mass and no thyromegaly present.   Cardiovascular: Normal rate and regular rhythm.   No murmur heard.  Pulmonary/Chest: Effort normal and breath sounds normal. No respiratory distress. She has no decreased breath sounds. She has no wheezes. She has no rhonchi. She has no rales. She exhibits no tenderness.   Abdominal: Soft. Bowel sounds are normal. There is tenderness in the epigastric area. There is no rebound and no guarding.   Musculoskeletal: Normal range of motion.   Neurological: She is alert and oriented to person, place, and time.   Skin: Skin is warm and dry.   Psychiatric: She has a normal mood and affect. Her behavior is normal.   Nursing note and vitals reviewed.      Assessment/Plan   Renée was seen today for  diabetes, hyperlipidemia and anxiety.    Diagnoses and all orders for this visit:    Gastroparesis  -     ondansetron (ZOFRAN) 4 MG tablet; Take 1 tablet by mouth Every 8 (Eight) Hours As Needed for Nausea or Vomiting.  -     dronabinol (MARINOL) 2.5 MG capsule; Take 1 capsule by mouth 2 (Two) Times a Day Before Meals.    Depression, unspecified depression type  -     desvenlafaxine (PRISTIQ) 100 MG 24 hr tablet; Take 1 tablet by mouth Daily.    Gastroesophageal reflux disease, esophagitis presence not specified  -     pantoprazole (PROTONIX) 40 MG EC tablet; Take 1 tablet by mouth Daily. Take first thing in the morning on an empty stomach.  Wait at least 30 min to 1hr before eating.    Post-pancreatectomy diabetes (CMS/HCC)    Type 1 diabetes mellitus without complications (CMS/HCC)    Acquired total absence of pancreas    Adjustment disorder with depressed mood    Nausea  -     dronabinol (MARINOL) 2.5 MG capsule; Take 1 capsule by mouth 2 (Two) Times a Day Before Meals.    Poor appetite  -     dronabinol (MARINOL) 2.5 MG capsule; Take 1 capsule by mouth 2 (Two) Times a Day Before Meals.    Need for immunization against influenza  -     Flucelvax Quad=>4Years ([id number])    Encounter for long-term (current) use of medications  -     Urine Drug Screen - Urine, Clean Catch    Other orders  -     Cancel: POCT Influenza A/B                   Current Outpatient Medications:   •  ALPHA LIPOIC ACID PO, Take 400 mg by mouth Daily., Disp: , Rfl:   •  B Complex Vitamins (VITAMIN B COMPLEX PO), Take 1 tablet by mouth Daily., Disp: , Rfl:   •  Biotin 60658 MCG tablet, Take 1 tablet by mouth Daily., Disp: , Rfl:   •  coenzyme Q10 50 MG capsule capsule, Take 50 mg by mouth Daily., Disp: , Rfl:   •  CREON 08229-78632 units capsule delayed-release particles capsule, TAKE 3 CAPSULES BY MOUTH THREE TIMES A DAY WITH MEALS AND 1-2 WITH SNACKS, Disp: 360 capsule, Rfl: 4  •  desvenlafaxine (PRISTIQ) 100 MG 24 hr tablet, Take 1 tablet  by mouth Daily., Disp: 30 tablet, Rfl: 5  •  GLUCAGON EMERGENCY 1 MG injection, INECT 1 MG UNDER THE SKIN ONE TIME AS NEEDED FOR LOW BLOOD SUGAR FOR UP TO 1 DOSE, Disp: 1 kit, Rfl: 11  •  glucose blood test strip, Used to test blood sugar 4 times daily for diabetes. E10.9, Disp: 150 each, Rfl: 12  •  insulin lispro (HumaLOG) 100 UNIT/ML patient supplied pump, Inject  under the skin Continuous. 1 unit per 25, Disp: , Rfl:   •  MAGNESIUM GLYCINATE PLUS PO, Take 1 tablet by mouth Daily., Disp: , Rfl:   •  Medium Chain Triglycerides (MCT OIL PO), Take 15 mL by mouth Daily., Disp: , Rfl:   •  Multiple Vitamins-Minerals (AQUADEKS PO), Take  by mouth., Disp: , Rfl:   •  NON FORMULARY, 340 mg Daily. l-cartinine fumerate, Disp: , Rfl:   •  NON FORMULARY, 2 g 3 (Three) Times a Day. polyglycoplex protein, Disp: , Rfl:   •  ondansetron (ZOFRAN) 4 MG tablet, Take 1 tablet by mouth Every 8 (Eight) Hours As Needed for Nausea or Vomiting., Disp: 60 tablet, Rfl: 1  •  pancrelipase, Lip-Prot-Amyl, (CREON) 00681-50356 units capsule delayed-release particles capsule, Take 3 tablets tid with meals and 1-2 with snacks, Disp: 360 capsule, Rfl: 5  •  pantoprazole (PROTONIX) 40 MG EC tablet, Take 1 tablet by mouth Daily. Take first thing in the morning on an empty stomach.  Wait at least 30 min to 1hr before eating., Disp: 30 tablet, Rfl: 5  •  Probiotic Product (PROBIOTIC DAILY PO), Take  by mouth., Disp: , Rfl:   •  vitamin C (ASCORBIC ACID) 500 MG tablet, Take 500 mg by mouth Daily., Disp: , Rfl:   •  vitamin D (ERGOCALCIFEROL) 04106 UNITS capsule capsule, TAKE ONE CAPSULE BY MOUTH EVERY 7 DAYS, Disp: 4 capsule, Rfl: 0  •  dronabinol (MARINOL) 2.5 MG capsule, Take 1 capsule by mouth 2 (Two) Times a Day Before Meals., Disp: 60 capsule, Rfl: 2    Return in about 3 months (around 3/13/2019), or if symptoms worsen or fail to improve, for Recheck.    Mannie report reviewed and is consistent #[id number]

## 2019-01-01 ENCOUNTER — PATIENT MESSAGE (OUTPATIENT)
Dept: INTERNAL MEDICINE | Facility: CLINIC | Age: 59
End: 2019-01-01

## 2019-01-02 RX ORDER — DRONABINOL 5 MG/1
5 CAPSULE ORAL
Qty: 60 CAPSULE | Refills: 0 | Status: SHIPPED | OUTPATIENT
Start: 2019-01-02 | End: 2019-02-01 | Stop reason: SDUPTHER

## 2019-01-02 NOTE — TELEPHONE ENCOUNTER
Sil Melendez MA 1/2/2019 9:39 AM EST        ----- Message -----  From: Renée Laurent  Sent: 1/1/2019 1:16 PM  To: [facility]  Subject: Prescription Question     ----- Message from Mychart, Generic sent at 1/1/2019 1:16 PM EST -----    Dr. Torres,    I’ve been taking the Dronabinol 2.5mg caplets twice a day as you prescribed, but over the past week increased the dose to 5mg 2x a day & have found this works better on pain for me. 10 mg 2x day has been the most helpful. At the 2.5mg dose I didn’t see any real improvement. In researching this med for chronic neuropathic pain, the suggested dose is 10mg to 20mg 2x a day.     I finally received approval from my insurance for Dronabinol but at the 2.5 mg dose. Would you be able to write a new script for me at the higher dose (10mg twice a day, or 5mg twice a day, if you’re concerned that 10mg is too much)? To leave it as it is won’t really help me because I won’t have enough meds to last the 30 days with the current prescription.

## 2019-02-01 RX ORDER — DRONABINOL 5 MG/1
CAPSULE ORAL
Qty: 60 CAPSULE | Refills: 1 | Status: SHIPPED | OUTPATIENT
Start: 2019-02-01 | End: 2019-04-11 | Stop reason: SDUPTHER

## 2019-04-11 ENCOUNTER — PATIENT MESSAGE (OUTPATIENT)
Dept: INTERNAL MEDICINE | Facility: CLINIC | Age: 59
End: 2019-04-11

## 2019-04-11 RX ORDER — DRONABINOL 5 MG/1
CAPSULE ORAL
Qty: 60 CAPSULE | Refills: 0 | Status: SHIPPED | OUTPATIENT
Start: 2019-04-11 | End: 2019-05-22 | Stop reason: SDUPTHER

## 2019-04-11 RX ORDER — AMOXICILLIN AND CLAVULANATE POTASSIUM 875; 125 MG/1; MG/1
1 TABLET, FILM COATED ORAL 2 TIMES DAILY
Qty: 20 TABLET | Refills: 0 | Status: SHIPPED | OUTPATIENT
Start: 2019-04-11 | End: 2019-05-22

## 2019-04-11 NOTE — TELEPHONE ENCOUNTER
Cindi Crespo MA 4/11/2019 4:40 PM EDT        ----- Message -----  From: Renée Laurent  Sent: 4/11/2019 4:14 PM  To: [facility] [address] Clinical Pool  Subject: Prescription Question     ----- Message from Mychart, Generic sent at 4/11/2019 4:14 PM EDT -----    I don’t have an appointment with my new dentist until May 9th and am going out of town for a week on Tu 4/16 with a tooth that’s broken and I think becoming infected. He can’t write a script for an antibiotic without my ever seeing him (new patient) and can’t get me in before I leave. Would Dr. Torres be able to order a script for an antibiotic that I can take with me should things flare up or get worse? That was the dentist’s suggestion. I am not allergic to any particular antibiotics.     [facility] in [place] is my pharmacy. It’s on file in your office. Thx so much.     Renée Laurent  [phone]

## 2019-04-15 ENCOUNTER — TELEPHONE (OUTPATIENT)
Dept: INTERNAL MEDICINE | Facility: CLINIC | Age: 59
End: 2019-04-15

## 2019-04-15 NOTE — TELEPHONE ENCOUNTER
[facility] only has 2.5MG and they could do 2 take twice daily.  Just need to confirm this is ok.  PH is [phone]

## 2019-05-15 RX ORDER — DRONABINOL 5 MG/1
CAPSULE ORAL
Qty: 30 CAPSULE | Refills: 0 | OUTPATIENT
Start: 2019-05-15

## 2019-05-22 ENCOUNTER — OFFICE VISIT (OUTPATIENT)
Dept: INTERNAL MEDICINE | Facility: CLINIC | Age: 59
End: 2019-05-22

## 2019-05-22 VITALS
HEART RATE: 85 BPM | DIASTOLIC BLOOD PRESSURE: 64 MMHG | WEIGHT: 139.2 LBS | BODY MASS INDEX: 23.19 KG/M2 | HEIGHT: 65 IN | OXYGEN SATURATION: 98 % | TEMPERATURE: 97.2 F | SYSTOLIC BLOOD PRESSURE: 120 MMHG

## 2019-05-22 DIAGNOSIS — Z79.899 ENCOUNTER FOR LONG-TERM (CURRENT) USE OF MEDICATIONS: ICD-10-CM

## 2019-05-22 DIAGNOSIS — Z90.410 POST-PANCREATECTOMY DIABETES (HCC): ICD-10-CM

## 2019-05-22 DIAGNOSIS — F32.A DEPRESSION, UNSPECIFIED DEPRESSION TYPE: ICD-10-CM

## 2019-05-22 DIAGNOSIS — K21.9 GASTROESOPHAGEAL REFLUX DISEASE, ESOPHAGITIS PRESENCE NOT SPECIFIED: ICD-10-CM

## 2019-05-22 DIAGNOSIS — R53.83 OTHER FATIGUE: ICD-10-CM

## 2019-05-22 DIAGNOSIS — E10.69 TYPE 1 DIABETES MELLITUS WITH OTHER SPECIFIED COMPLICATION (HCC): Primary | ICD-10-CM

## 2019-05-22 DIAGNOSIS — F41.8 SITUATIONAL ANXIETY: ICD-10-CM

## 2019-05-22 DIAGNOSIS — E13.9 POST-PANCREATECTOMY DIABETES (HCC): ICD-10-CM

## 2019-05-22 DIAGNOSIS — C92.91 MYELOID LEUKEMIA IN REMISSION, UNSPECIFIED MYELOID LEUKEMIA TYPE (HCC): ICD-10-CM

## 2019-05-22 DIAGNOSIS — E55.9 VITAMIN D DEFICIENCY: ICD-10-CM

## 2019-05-22 DIAGNOSIS — E78.2 MIXED HYPERLIPIDEMIA: ICD-10-CM

## 2019-05-22 DIAGNOSIS — K31.84 GASTROPARESIS: ICD-10-CM

## 2019-05-22 DIAGNOSIS — E89.1 POST-PANCREATECTOMY DIABETES (HCC): ICD-10-CM

## 2019-05-22 DIAGNOSIS — K91.2 POSTSURGICAL MALABSORPTION: ICD-10-CM

## 2019-05-22 LAB
AMPHET+METHAMPHET UR QL: NEGATIVE
AMPHETAMINES UR QL: NEGATIVE
BARBITURATES UR QL SCN: NEGATIVE
BENZODIAZ UR QL SCN: NEGATIVE
BUPRENORPHINE SERPL-MCNC: NEGATIVE NG/ML
CANNABINOIDS SERPL QL: POSITIVE
COCAINE UR QL: NEGATIVE
HBA1C MFR BLD: 6.4 %
METHADONE UR QL SCN: NEGATIVE
OPIATES UR QL: NEGATIVE
OXYCODONE UR QL SCN: NEGATIVE
PCP UR QL SCN: NEGATIVE
PROPOXYPH UR QL: NEGATIVE
TRICYCLICS UR QL SCN: NEGATIVE

## 2019-05-22 PROCEDURE — 80306 DRUG TEST PRSMV INSTRMNT: CPT | Performed by: FAMILY MEDICINE

## 2019-05-22 PROCEDURE — 82306 VITAMIN D 25 HYDROXY: CPT | Performed by: FAMILY MEDICINE

## 2019-05-22 PROCEDURE — 99214 OFFICE O/P EST MOD 30 MIN: CPT | Performed by: FAMILY MEDICINE

## 2019-05-22 PROCEDURE — 83036 HEMOGLOBIN GLYCOSYLATED A1C: CPT | Performed by: FAMILY MEDICINE

## 2019-05-22 PROCEDURE — 80053 COMPREHEN METABOLIC PANEL: CPT | Performed by: FAMILY MEDICINE

## 2019-05-22 PROCEDURE — 85025 COMPLETE CBC W/AUTO DIFF WBC: CPT | Performed by: FAMILY MEDICINE

## 2019-05-22 PROCEDURE — 82607 VITAMIN B-12: CPT | Performed by: FAMILY MEDICINE

## 2019-05-22 PROCEDURE — 82746 ASSAY OF FOLIC ACID SERUM: CPT | Performed by: FAMILY MEDICINE

## 2019-05-22 PROCEDURE — 82570 ASSAY OF URINE CREATININE: CPT | Performed by: FAMILY MEDICINE

## 2019-05-22 PROCEDURE — 80061 LIPID PANEL: CPT | Performed by: FAMILY MEDICINE

## 2019-05-22 PROCEDURE — 84443 ASSAY THYROID STIM HORMONE: CPT | Performed by: FAMILY MEDICINE

## 2019-05-22 PROCEDURE — 82043 UR ALBUMIN QUANTITATIVE: CPT | Performed by: FAMILY MEDICINE

## 2019-05-22 PROCEDURE — 84439 ASSAY OF FREE THYROXINE: CPT | Performed by: FAMILY MEDICINE

## 2019-05-22 RX ORDER — DRONABINOL 5 MG/1
5 CAPSULE ORAL
Qty: 60 CAPSULE | Refills: 0 | Status: SHIPPED | OUTPATIENT
Start: 2019-05-22 | End: 2019-07-22 | Stop reason: SDUPTHER

## 2019-05-22 RX ORDER — PANTOPRAZOLE SODIUM 40 MG/1
40 TABLET, DELAYED RELEASE ORAL DAILY
Qty: 30 TABLET | Refills: 5 | Status: SHIPPED | OUTPATIENT
Start: 2019-05-22 | End: 2019-12-18 | Stop reason: SDUPTHER

## 2019-05-22 RX ORDER — DESVENLAFAXINE 100 MG/1
100 TABLET, EXTENDED RELEASE ORAL DAILY
Qty: 30 TABLET | Refills: 5 | Status: SHIPPED | OUTPATIENT
Start: 2019-05-22 | End: 2019-11-04 | Stop reason: SDUPTHER

## 2019-05-22 RX ORDER — BUSPIRONE HYDROCHLORIDE 5 MG/1
5 TABLET ORAL 2 TIMES DAILY
Qty: 60 TABLET | Refills: 0 | Status: SHIPPED | OUTPATIENT
Start: 2019-05-22 | End: 2019-06-16 | Stop reason: SDUPTHER

## 2019-05-22 RX ORDER — BUSPIRONE HYDROCHLORIDE 5 MG/1
5 TABLET ORAL 2 TIMES DAILY
Qty: 60 TABLET | Refills: 2 | Status: SHIPPED | OUTPATIENT
Start: 2019-05-22 | End: 2019-05-22 | Stop reason: SDUPTHER

## 2019-05-22 RX ORDER — DRONABINOL 5 MG/1
5 CAPSULE ORAL
Qty: 60 CAPSULE | Refills: 0 | Status: SHIPPED | OUTPATIENT
Start: 2019-05-22 | End: 2019-05-22 | Stop reason: SDUPTHER

## 2019-05-22 RX ORDER — ONDANSETRON 4 MG/1
4 TABLET, FILM COATED ORAL EVERY 8 HOURS PRN
Qty: 60 TABLET | Refills: 1 | Status: SHIPPED | OUTPATIENT
Start: 2019-05-22 | End: 2019-07-22 | Stop reason: SDUPTHER

## 2019-05-22 NOTE — PROGRESS NOTES
"Subjective   Renée Laurent is a 58 y.o. female.     Chief Complaint   Patient presents with   • Diabetes   • Hyperlipidemia     f/u   • Vitamin D Deficiency   • Heartburn   • postsurgical malabsorption       Visit Vitals  /64 (Cuff Size: Adult)   Pulse 85   Temp 97.2 °F (36.2 °C)   Ht 165.1 cm (65\")   Wt 63.1 kg (139 lb 3.2 oz)   LMP  (LMP Unknown)   SpO2 98%   BMI 23.16 kg/m²         Diabetes   She presents for her follow-up diabetic visit. She has type 1 diabetes mellitus. Her disease course has been stable. Hypoglycemia symptoms include confusion, nervousness/anxiousness, sweats and tremors. Pertinent negatives for hypoglycemia include no dizziness or headaches. (Pt has had drops to 28. ) Associated symptoms include fatigue and weight loss. Pertinent negatives for diabetes include no blurred vision, no chest pain, no foot paresthesias, no foot ulcerations, no polydipsia, no polyphagia, no polyuria, no visual change and no weakness. Hypoglycemia complications include required assistance. Pertinent negatives for diabetic complications include no CVA, heart disease, nephropathy, peripheral neuropathy, PVD or retinopathy. Risk factors for coronary artery disease include diabetes mellitus, dyslipidemia and post-menopausal. Current diabetic treatment includes insulin pump. She is compliant with treatment most of the time. Her weight is decreasing steadily. She is following a generally healthy diet. Meal planning includes avoidance of concentrated sweets. She participates in exercise intermittently. Her home blood glucose trend is fluctuating dramatically. Her breakfast blood glucose range is generally 70-90 mg/dl. (Up to 300 for high) An ACE inhibitor/angiotensin II receptor blocker is not being taken. She does not see a podiatrist.Eye exam is not current.   Hyperlipidemia   This is a chronic problem. The current episode started more than 1 year ago. The problem is controlled. Recent lipid tests were reviewed " and are normal. Exacerbating diseases include diabetes. She has no history of chronic renal disease, hypothyroidism, liver disease, obesity or nephrotic syndrome. There are no known factors aggravating her hyperlipidemia. Pertinent negatives include no chest pain, focal sensory loss, focal weakness, leg pain, myalgias or shortness of breath. Current antihyperlipidemic treatment includes exercise and diet change. The current treatment provides significant improvement of lipids. There are no compliance problems.  Risk factors for coronary artery disease include diabetes mellitus, dyslipidemia and post-menopausal.      Pt is still working. Pt has broken up a relationship and is moving back to [place] to be near family.     Pt just changed to Omnipod DM pump for diabetes and has had some low sugars. Pt will need Endo here, but will not be moving back until July or August.  Patient is status post pancreatectomy.  Discussed with patient different types of post rich foods to either carry with her or have around her house.    Pt had gastric stimulator placed in December 2018 with good results. Pt had this done in [place]. Pt has follow-up with GI next week.   Pt has tried marinol 5 mg with good appetite stimulation.     Pt has been having a lot of anxiety with change in relationship. Pt eventually wants to stop the pristiq, but wants to wait until her life is more stable.     Pt is losing weight, which she wanted to do and she is watching what she eats.   Pt is taking biotin and B complex supplement.     Pt will get the mammogram when she moves back to [place].  Pt will get shingrix when she moves back to [place].    Pt states that heartburn is stable. Pt still has some bile vomit. Pt has follow up with GI to check her gastric stimulator.     She has history of myeloid leukemia in the past.  She has been in remission.  Patient states she has not been on any medication for control her remission for the past 4 to 5  years.  Patient is doing well regarding that.  The following portions of the patient's history were reviewed and updated as appropriate: allergies, current medications, past family history, past medical history, past social history, past surgical history and problem list.    Past Medical History:   Diagnosis Date   • Abdominal pain    • Absence of pancreas, acquired    • Allergic    • Anemia    • Anxiety    • Asthma    • Bile reflux gastritis    • Chronic illness    • Contact dermatitis     due to plants   • Depression    • Diabetes mellitus (CMS/HCC)    • Eating disorder     Resolved   • Encounter for dental examination 07/2014   • Gastroparesis    • GERD (gastroesophageal reflux disease)    • History of medical problems 12/1/2011    Gastroparesis   • Hyperlipidemia    • Incisional hernia    • Insomnia    • Iron deficiency    • Kidney stone     Left kidney   • Myeloid leukemia (CMS/HCC)    • Osteopenia    • Pancreatitis    • Pneumonia 1/1/2017   • Post-splenectomy    • Vitamin D deficiency       Past Surgical History:   Procedure Laterality Date   • ABDOMINAL SURGERY     • CERVICAL DISCECTOMY ANTERIOR     • CHOLECYSTECTOMY     • COLONOSCOPY     • COSMETIC SURGERY      Rhinoplasty   • GALLBLADDER SURGERY     • GASTRIC STIMULATOR IMPLANT SURGERY  12/17/2018    [facility]   • HERNIA MESH REMOVAL  2014    abdominal hernia with mesh    • OTHER SURGICAL HISTORY      duodeum removed   • OTHER SURGICAL HISTORY      jejunem removed   • PANCREAS SURGERY      pancreas removal   • SMALL INTESTINE SURGERY  12/1/2011    [facility]   • STOMACH SURGERY      distal stomach    • TOTAL ABDOMINAL HYSTERECTOMY WITH SALPINGO OOPHORECTOMY     • TUBAL ABDOMINAL LIGATION     • UPPER GASTROINTESTINAL ENDOSCOPY  10/25/2017    Dr Mehta, gastritis, eosinphils in esophagus      Family History   Problem Relation Age of Onset   • Osteoporosis Mother    • Cancer Mother         small bowel cancer   • Hashimoto's thyroiditis Mother    •  Arthritis Mother    • Depression Mother    • Thyroid disease Mother    • Lung cancer Father    • Alcohol abuse Father    • Cancer Father    • Asthma Sister       Social History     Socioeconomic History   • Marital status:      Spouse name: Not on file   • Number of children: Not on file   • Years of education: Not on file   • Highest education level: Not on file   Tobacco Use   • Smoking status: Never Smoker   • Smokeless tobacco: Never Used   Substance and Sexual Activity   • Alcohol use: No   • Drug use: No   • Sexual activity: Yes     Partners: Male     Birth control/protection: Post-menopausal      Allergies   Allergen Reactions   • Aspirin Hives   • Phenergan [Promethazine Hcl]      Anxiety/relessness   • Reglan [Metoclopramide]      Involuntary movement       Review of Systems   Constitutional: Positive for appetite change, diaphoresis, fatigue and weight loss. Negative for chills and fever.   HENT: Negative.  Negative for ear pain, nosebleeds, postnasal drip, rhinorrhea, sinus pressure, sneezing and sore throat.    Eyes: Negative.  Negative for blurred vision, redness and itching.   Respiratory: Negative.  Negative for cough, shortness of breath and wheezing.    Cardiovascular: Negative.  Negative for chest pain and palpitations.   Gastrointestinal: Positive for abdominal pain (chronic), constipation, nausea and vomiting (improving). Negative for diarrhea.   Endocrine: Negative.  Negative for cold intolerance, heat intolerance, polydipsia, polyphagia and polyuria.   Genitourinary: Negative.  Negative for dysuria, frequency, hematuria and urgency.   Musculoskeletal: Negative.  Negative for arthralgias, back pain, myalgias and neck pain.   Skin: Negative.  Negative for color change and rash.   Allergic/Immunologic: Negative.  Negative for environmental allergies.   Neurological: Positive for tremors. Negative for dizziness, focal weakness, syncope, weakness, light-headedness and headaches.    Hematological: Negative.  Negative for adenopathy. Does not bruise/bleed easily.   Psychiatric/Behavioral: Positive for confusion. Negative for dysphoric mood and suicidal ideas. The patient is nervous/anxious.        Objective   Physical Exam   Constitutional: She is oriented to person, place, and time. She appears well-developed.   HENT:   Head: Normocephalic.   Right Ear: External ear normal.   Left Ear: External ear normal.   Nose: Nose normal.   Eyes: Conjunctivae, EOM and lids are normal. Pupils are equal, round, and reactive to light.   Neck: Trachea normal and normal range of motion. Neck supple. Carotid bruit is not present. No thyroid mass and no thyromegaly present.   Cardiovascular: Normal rate and regular rhythm.   No murmur heard.  Pulmonary/Chest: Effort normal and breath sounds normal. No respiratory distress. She has no decreased breath sounds. She has no wheezes. She has no rhonchi. She has no rales. She exhibits no tenderness.   Abdominal: Soft. Bowel sounds are normal. There is generalized tenderness (mild).   Musculoskeletal: Normal range of motion.   Neurological: She is alert and oriented to person, place, and time.   Skin: Skin is warm and dry.   Psychiatric: She has a normal mood and affect. Her behavior is normal.   Nursing note and vitals reviewed.      Assessment/Plan   Renée was seen today for diabetes, hyperlipidemia, vitamin d deficiency, heartburn and postsurgical malabsorption.    Diagnoses and all orders for this visit:    Type 1 diabetes mellitus with other specified complication (CMS/[facility])  -     POC Glycosylated Hemoglobin (Hb A1C)  -     Microalbumin / Creatinine Urine Ratio - Urine, Clean Catch  -     Ambulatory Referral to Endocrinology    Myeloid leukemia in remission, unspecified myeloid leukemia type (CMS/HCC)  -     CBC & Differential  -     CBC Auto Differential    Post-pancreatectomy diabetes (CMS/[facility])  -     Ambulatory Referral to Endocrinology    Gastroparesis  -      ondansetron (ZOFRAN) 4 MG tablet; Take 1 tablet by mouth Every 8 (Eight) Hours As Needed for Nausea or Vomiting.  -     Vitamin B12  -     Folate  -     Vitamin D 25 Hydroxy    Mixed hyperlipidemia  -     Comprehensive Metabolic Panel  -     Lipid Panel    Gastroesophageal reflux disease, esophagitis presence not specified  -     pantoprazole (PROTONIX) 40 MG EC tablet; Take 1 tablet by mouth Daily. Take first thing in the morning on an empty stomach.  Wait at least 30 min to 1hr before eating.    Depression, unspecified depression type  -     desvenlafaxine (PRISTIQ) 100 MG 24 hr tablet; Take 1 tablet by mouth Daily.    Encounter for long-term (current) use of medications  -     Urine Drug Screen - Urine, Clean Catch    Situational anxiety    Postsurgical malabsorption  -     Vitamin B12  -     Folate  -     Vitamin D 25 Hydroxy    Vitamin D deficiency  -     Vitamin D 25 Hydroxy    Other fatigue  -     TSH  -     T4, Free    Other orders  -     Discontinue: dronabinol (MARINOL) 5 MG capsule; Take 1 capsule by mouth 2 (Two) Times a Day Before Meals.  -     glucagon (GLUCAGON EMERGENCY) 1 MG injection; Inject 1 mg into the appropriate muscle as directed by prescriber 1 (One) Time As Needed for Low Blood Sugar for up to 1 dose.  -     Discontinue: busPIRone (BUSPAR) 5 MG tablet; Take 1 tablet by mouth 2 (Two) Times a Day.  -     busPIRone (BUSPAR) 5 MG tablet; Take 1 tablet by mouth 2 (Two) Times a Day.  -     dronabinol (MARINOL) 5 MG capsule; Take 1 capsule by mouth 2 (Two) Times a Day Before Meals.                   Current Outpatient Medications:   •  ALPHA LIPOIC ACID PO, Take 400 mg by mouth Daily., Disp: , Rfl:   •  B Complex Vitamins (VITAMIN B COMPLEX PO), Take 1 tablet by mouth Daily., Disp: , Rfl:   •  Biotin 09417 MCG tablet, Take 1 tablet by mouth Daily., Disp: , Rfl:   •  coenzyme Q10 50 MG capsule capsule, Take 50 mg by mouth Daily., Disp: , Rfl:   •  CREON 80306-03969 units capsule delayed-release  particles capsule, TAKE 3 CAPSULES BY MOUTH THREE TIMES A DAY WITH MEALS AND 1-2 WITH SNACKS, Disp: 360 capsule, Rfl: 4  •  desvenlafaxine (PRISTIQ) 100 MG 24 hr tablet, Take 1 tablet by mouth Daily., Disp: 30 tablet, Rfl: 5  •  dronabinol (MARINOL) 5 MG capsule, Take 1 capsule by mouth 2 (Two) Times a Day Before Meals., Disp: 60 capsule, Rfl: 0  •  glucagon (GLUCAGON EMERGENCY) 1 MG injection, Inject 1 mg into the appropriate muscle as directed by prescriber 1 (One) Time As Needed for Low Blood Sugar for up to 1 dose., Disp: 1 kit, Rfl: 11  •  glucose blood test strip, Used to test blood sugar 4 times daily for diabetes. E10.9, Disp: 150 each, Rfl: 12  •  insulin lispro (HumaLOG) 100 UNIT/ML patient supplied pump, Inject  under the skin Continuous. 1 unit per 25, Disp: , Rfl:   •  MAGNESIUM GLYCINATE PLUS PO, Take 1 tablet by mouth Daily., Disp: , Rfl:   •  Medium Chain Triglycerides (MCT OIL PO), Take 15 mL by mouth Daily., Disp: , Rfl:   •  Multiple Vitamins-Minerals (AQUADEKS PO), Take  by mouth., Disp: , Rfl:   •  NON FORMULARY, 2 g 3 (Three) Times a Day. polyglycoplex protein, Disp: , Rfl:   •  ondansetron (ZOFRAN) 4 MG tablet, Take 1 tablet by mouth Every 8 (Eight) Hours As Needed for Nausea or Vomiting., Disp: 60 tablet, Rfl: 1  •  pancrelipase, Lip-Prot-Amyl, (CREON) 49555-09992 units capsule delayed-release particles capsule, Take 3 tablets tid with meals and 1-2 with snacks, Disp: 360 capsule, Rfl: 5  •  pantoprazole (PROTONIX) 40 MG EC tablet, Take 1 tablet by mouth Daily. Take first thing in the morning on an empty stomach.  Wait at least 30 min to 1hr before eating., Disp: 30 tablet, Rfl: 5  •  Probiotic Product (PROBIOTIC DAILY PO), Take  by mouth., Disp: , Rfl:   •  vitamin C (ASCORBIC ACID) 500 MG tablet, Take 500 mg by mouth Daily., Disp: , Rfl:   •  vitamin D (ERGOCALCIFEROL) 77613 UNITS capsule capsule, TAKE ONE CAPSULE BY MOUTH EVERY 7 DAYS, Disp: 4 capsule, Rfl: 0  •  busPIRone (BUSPAR) 5 MG  tablet, Take 1 tablet by mouth 2 (Two) Times a Day., Disp: 60 tablet, Rfl: 0    Return in about 3 months (around 8/22/2019), or if symptoms worsen or fail to improve, for Medicare Wellness, Recheck.    Recent Results (from the past 168 hour(s))   POC Glycosylated Hemoglobin (Hb A1C)    Collection Time: 05/22/19  4:17 PM   Result Value Ref Range    Hemoglobin A1C 6.4 %     Hemoglobin A1C   Date Value Ref Range Status   05/22/2019 6.4 % Final   05/11/2017 6.30 (H) 4.80 - 5.60 % Final   12/20/2016 7.30 (H) 4.80 - 5.60 % Final

## 2019-05-23 ENCOUNTER — TELEPHONE (OUTPATIENT)
Dept: INTERNAL MEDICINE | Facility: CLINIC | Age: 59
End: 2019-05-23

## 2019-05-23 LAB
25(OH)D3 SERPL-MCNC: 22.3 NG/ML (ref 30–100)
ALBUMIN SERPL-MCNC: 4.4 G/DL (ref 3.5–5.2)
ALBUMIN UR-MCNC: <1.2 MG/L
ALBUMIN/GLOB SERPL: 1 G/DL
ALP SERPL-CCNC: 181 U/L (ref 39–117)
ALT SERPL W P-5'-P-CCNC: 18 U/L (ref 1–33)
ANION GAP SERPL CALCULATED.3IONS-SCNC: 15 MMOL/L
AST SERPL-CCNC: 31 U/L (ref 1–32)
BASOPHILS # BLD AUTO: 0.08 10*3/MM3 (ref 0–0.2)
BASOPHILS NFR BLD AUTO: 0.6 % (ref 0–1.5)
BILIRUB SERPL-MCNC: 0.4 MG/DL (ref 0.2–1.2)
BUN BLD-MCNC: 15 MG/DL (ref 8–23)
BUN/CREAT SERPL: 15 (ref 7–25)
CALCIUM SPEC-SCNC: 10 MG/DL (ref 8.6–10.5)
CHLORIDE SERPL-SCNC: 94 MMOL/L (ref 98–107)
CHOLEST SERPL-MCNC: 254 MG/DL (ref 0–200)
CO2 SERPL-SCNC: 26 MMOL/L (ref 22–29)
CREAT BLD-MCNC: 1 MG/DL (ref 0.57–1)
CREAT UR-MCNC: 124.4 MG/DL
DEPRECATED RDW RBC AUTO: 53.9 FL (ref 37–54)
EOSINOPHIL # BLD AUTO: 0.77 10*3/MM3 (ref 0–0.4)
EOSINOPHIL NFR BLD AUTO: 5.4 % (ref 0.3–6.2)
ERYTHROCYTE [DISTWIDTH] IN BLOOD BY AUTOMATED COUNT: 15.6 % (ref 12.3–15.4)
FOLATE SERPL-MCNC: 14.5 NG/ML (ref 4.78–24.2)
GFR SERPL CREATININE-BSD FRML MDRD: 57 ML/MIN/1.73
GLOBULIN UR ELPH-MCNC: 4.3 GM/DL
GLUCOSE BLD-MCNC: 182 MG/DL (ref 65–99)
HCT VFR BLD AUTO: 43.5 % (ref 34–46.6)
HDLC SERPL-MCNC: 67 MG/DL (ref 40–60)
HGB BLD-MCNC: 13.4 G/DL (ref 12–15.9)
IMM GRANULOCYTES # BLD AUTO: 0.04 10*3/MM3 (ref 0–0.05)
IMM GRANULOCYTES NFR BLD AUTO: 0.3 % (ref 0–0.5)
LDLC SERPL CALC-MCNC: 152 MG/DL (ref 0–100)
LDLC/HDLC SERPL: 2.27 {RATIO}
LYMPHOCYTES # BLD AUTO: 4.4 10*3/MM3 (ref 0.7–3.1)
LYMPHOCYTES NFR BLD AUTO: 30.9 % (ref 19.6–45.3)
MCH RBC QN AUTO: 28.9 PG (ref 26.6–33)
MCHC RBC AUTO-ENTMCNC: 30.8 G/DL (ref 31.5–35.7)
MCV RBC AUTO: 93.8 FL (ref 79–97)
MICROALBUMIN/CREAT UR: NORMAL MG/G
MONOCYTES # BLD AUTO: 1.69 10*3/MM3 (ref 0.1–0.9)
MONOCYTES NFR BLD AUTO: 11.9 % (ref 5–12)
NEUTROPHILS # BLD AUTO: 7.25 10*3/MM3 (ref 1.7–7)
NEUTROPHILS NFR BLD AUTO: 50.9 % (ref 42.7–76)
NRBC BLD AUTO-RTO: 0 /100 WBC (ref 0–0.2)
PLATELET # BLD AUTO: 478 10*3/MM3 (ref 140–450)
PMV BLD AUTO: 12.4 FL (ref 6–12)
POTASSIUM BLD-SCNC: 4.4 MMOL/L (ref 3.5–5.2)
PROT SERPL-MCNC: 8.7 G/DL (ref 6–8.5)
RBC # BLD AUTO: 4.64 10*6/MM3 (ref 3.77–5.28)
SODIUM BLD-SCNC: 135 MMOL/L (ref 136–145)
T4 FREE SERPL-MCNC: 1.04 NG/DL (ref 0.93–1.7)
TRIGL SERPL-MCNC: 174 MG/DL (ref 0–150)
TSH SERPL DL<=0.05 MIU/L-ACNC: 1.4 MIU/ML (ref 0.27–4.2)
VIT B12 BLD-MCNC: 447 PG/ML (ref 211–946)
VLDLC SERPL-MCNC: 34.8 MG/DL (ref 5–40)
WBC NRBC COR # BLD: 14.23 10*3/MM3 (ref 3.4–10.8)

## 2019-05-23 NOTE — TELEPHONE ENCOUNTER
----- Message from Karla EDDY MD sent at 5/23/2019 11:21 AM EDT -----  Vitamin D is low.  Please start over-the-counter vitamin D3 at 1000 units/day.    Thyroid, B12, folate and urine microalbumin creatinine ratio were normal.    Patient's total protein slightly high this was seen previously.     Alkaline phosphatase slightly high.  Patient's had recent surgery which can affect those numbers.    Glomerular filtration rate has decreased since last lab.  Please avoid Advil or Aleve.  Please make sure patient stays well-hydrated.    White count has increased to 14,000.  Is has patient been ill recently?  Platelets staying slightly high about the same as before.  Urine drug screen is consistent with history.

## 2019-06-01 ENCOUNTER — PATIENT MESSAGE (OUTPATIENT)
Dept: INTERNAL MEDICINE | Facility: CLINIC | Age: 59
End: 2019-06-01

## 2019-06-04 RX ORDER — AMOXICILLIN AND CLAVULANATE POTASSIUM 875; 125 MG/1; MG/1
1 TABLET, FILM COATED ORAL 2 TIMES DAILY
Qty: 20 TABLET | Refills: 0 | Status: SHIPPED | OUTPATIENT
Start: 2019-06-04 | End: 2019-09-11

## 2019-06-04 NOTE — TELEPHONE ENCOUNTER
Cindi Crespo MA 6/4/2019 9:38 AM EDT        ----- Message -----  From: Renée Laurent  Sent: 6/1/2019 4:14 PM  To: [facility] [address] Clinical Pool  Subject: Non-Urgent Medical Question     ----- Message from Mychart, Generic sent at 6/1/2019 4:14 PM EDT -----    Dr. Torres,    I’m in [place], [place] until 6/12 and am pretty sure I’ve got a return of an infection in my mouth from a bad tooth. You recently prescribed an antibiotic for this which really helped keep it at bay. Can you call in the script again? With my  and moving, I’m not going to be able to get to a dentist until I’m settled in [place] some time in July.     [facility] in [place] that go by [facility]”. Would you call in an antibiotic on Monday if possible to the following store so I can pick it up? Thanks so much:    [facility]  [address] [place] [address]  Store Phone: [phone]    Renée

## 2019-06-17 RX ORDER — BUSPIRONE HYDROCHLORIDE 5 MG/1
5 TABLET ORAL 2 TIMES DAILY
Qty: 60 TABLET | Refills: 3 | Status: SHIPPED | OUTPATIENT
Start: 2019-06-17 | End: 2019-09-11 | Stop reason: SDUPTHER

## 2019-07-22 DIAGNOSIS — K31.84 GASTROPARESIS: ICD-10-CM

## 2019-07-22 RX ORDER — ONDANSETRON 4 MG/1
TABLET, FILM COATED ORAL
Qty: 60 TABLET | Refills: 0 | Status: SHIPPED | OUTPATIENT
Start: 2019-07-22 | End: 2019-08-07 | Stop reason: SDUPTHER

## 2019-07-22 RX ORDER — DRONABINOL 5 MG/1
CAPSULE ORAL
Qty: 60 CAPSULE | Refills: 0 | Status: SHIPPED | OUTPATIENT
Start: 2019-07-22 | End: 2019-09-11 | Stop reason: SDUPTHER

## 2019-08-01 RX ORDER — DRONABINOL 5 MG/1
5 CAPSULE ORAL
Qty: 180 CAPSULE | Refills: 0 | OUTPATIENT
Start: 2019-08-01

## 2019-08-07 DIAGNOSIS — K31.84 GASTROPARESIS: ICD-10-CM

## 2019-08-07 RX ORDER — ONDANSETRON 4 MG/1
TABLET, FILM COATED ORAL
Qty: 60 TABLET | Refills: 0 | Status: SHIPPED | OUTPATIENT
Start: 2019-08-07 | End: 2019-09-14 | Stop reason: SDUPTHER

## 2019-08-07 NOTE — TELEPHONE ENCOUNTER
Attempted to contact patient. No answer. Left voicemail message that medication she had requested was unable to be refilled. Explained that patient needed to be seen prior to refill. Stated upcoming follow up appt on 8/22. Office number given If patient had any questions/concerns before then.

## 2019-08-27 DIAGNOSIS — Z90.410 POST-PANCREATECTOMY DIABETES (HCC): ICD-10-CM

## 2019-08-27 DIAGNOSIS — E13.9 POST-PANCREATECTOMY DIABETES (HCC): ICD-10-CM

## 2019-08-27 DIAGNOSIS — E89.1 POST-PANCREATECTOMY DIABETES (HCC): ICD-10-CM

## 2019-09-11 ENCOUNTER — OFFICE VISIT (OUTPATIENT)
Dept: INTERNAL MEDICINE | Facility: CLINIC | Age: 59
End: 2019-09-11

## 2019-09-11 VITALS
TEMPERATURE: 98 F | HEART RATE: 85 BPM | DIASTOLIC BLOOD PRESSURE: 60 MMHG | WEIGHT: 136.2 LBS | BODY MASS INDEX: 22.69 KG/M2 | OXYGEN SATURATION: 98 % | HEIGHT: 65 IN | SYSTOLIC BLOOD PRESSURE: 108 MMHG

## 2019-09-11 DIAGNOSIS — E13.9 POST-PANCREATECTOMY DIABETES (HCC): ICD-10-CM

## 2019-09-11 DIAGNOSIS — K91.2 POSTSURGICAL MALABSORPTION: ICD-10-CM

## 2019-09-11 DIAGNOSIS — R63.0 DECREASE IN APPETITE: ICD-10-CM

## 2019-09-11 DIAGNOSIS — Z12.31 ENCOUNTER FOR SCREENING MAMMOGRAM FOR MALIGNANT NEOPLASM OF BREAST: ICD-10-CM

## 2019-09-11 DIAGNOSIS — F41.9 ANXIETY: ICD-10-CM

## 2019-09-11 DIAGNOSIS — Z11.59 NEED FOR HEPATITIS C SCREENING TEST: ICD-10-CM

## 2019-09-11 DIAGNOSIS — D72.829 LEUKOCYTOSIS, UNSPECIFIED TYPE: ICD-10-CM

## 2019-09-11 DIAGNOSIS — E10.43 DIABETIC GASTROPARESIS ASSOCIATED WITH TYPE 1 DIABETES MELLITUS (HCC): ICD-10-CM

## 2019-09-11 DIAGNOSIS — Z96.89 PRESENCE OF GASTRIC PACEMAKER: ICD-10-CM

## 2019-09-11 DIAGNOSIS — Z90.410 POST-PANCREATECTOMY DIABETES (HCC): ICD-10-CM

## 2019-09-11 DIAGNOSIS — L65.9 HAIR LOSS: ICD-10-CM

## 2019-09-11 DIAGNOSIS — E10.69 TYPE 1 DIABETES MELLITUS WITH OTHER SPECIFIED COMPLICATION (HCC): Primary | ICD-10-CM

## 2019-09-11 DIAGNOSIS — E89.1 POST-PANCREATECTOMY DIABETES (HCC): ICD-10-CM

## 2019-09-11 DIAGNOSIS — K31.84 DIABETIC GASTROPARESIS ASSOCIATED WITH TYPE 1 DIABETES MELLITUS (HCC): ICD-10-CM

## 2019-09-11 DIAGNOSIS — E78.5 HYPERLIPIDEMIA, UNSPECIFIED HYPERLIPIDEMIA TYPE: ICD-10-CM

## 2019-09-11 DIAGNOSIS — Z23 NEED FOR VACCINATION: ICD-10-CM

## 2019-09-11 DIAGNOSIS — Z90.410 ACQUIRED TOTAL ABSENCE OF PANCREAS: ICD-10-CM

## 2019-09-11 LAB — HBA1C MFR BLD: 7 %

## 2019-09-11 PROCEDURE — 36415 COLL VENOUS BLD VENIPUNCTURE: CPT | Performed by: FAMILY MEDICINE

## 2019-09-11 PROCEDURE — 83036 HEMOGLOBIN GLYCOSYLATED A1C: CPT | Performed by: FAMILY MEDICINE

## 2019-09-11 PROCEDURE — 80053 COMPREHEN METABOLIC PANEL: CPT | Performed by: FAMILY MEDICINE

## 2019-09-11 PROCEDURE — 99214 OFFICE O/P EST MOD 30 MIN: CPT | Performed by: FAMILY MEDICINE

## 2019-09-11 PROCEDURE — 84443 ASSAY THYROID STIM HORMONE: CPT | Performed by: FAMILY MEDICINE

## 2019-09-11 PROCEDURE — 84134 ASSAY OF PREALBUMIN: CPT | Performed by: FAMILY MEDICINE

## 2019-09-11 PROCEDURE — G0008 ADMIN INFLUENZA VIRUS VAC: HCPCS | Performed by: FAMILY MEDICINE

## 2019-09-11 PROCEDURE — 90674 CCIIV4 VAC NO PRSV 0.5 ML IM: CPT | Performed by: FAMILY MEDICINE

## 2019-09-11 PROCEDURE — 85025 COMPLETE CBC W/AUTO DIFF WBC: CPT | Performed by: FAMILY MEDICINE

## 2019-09-11 PROCEDURE — 80061 LIPID PANEL: CPT | Performed by: FAMILY MEDICINE

## 2019-09-11 PROCEDURE — 86803 HEPATITIS C AB TEST: CPT | Performed by: FAMILY MEDICINE

## 2019-09-11 RX ORDER — DRONABINOL 5 MG/1
5 CAPSULE ORAL
Qty: 60 CAPSULE | Refills: 2 | Status: SHIPPED | OUTPATIENT
Start: 2019-09-11 | End: 2020-03-23

## 2019-09-11 RX ORDER — BUSPIRONE HYDROCHLORIDE 5 MG/1
5 TABLET ORAL 2 TIMES DAILY
Qty: 60 TABLET | Refills: 5 | Status: SHIPPED | OUTPATIENT
Start: 2019-09-11 | End: 2020-06-22

## 2019-09-11 NOTE — PROGRESS NOTES
"Leni Laurent is a 58 y.o. female.     Chief Complaint   Patient presents with   • Diabetes     f/u doesn't see new endo until november and will need rx renewed   • referral for eye doctor       Visit Vitals  /60 (BP Location: Right arm, Cuff Size: Adult)   Pulse 85   Temp 98 °F (36.7 °C)   Ht 165.1 cm (65\")   Wt 61.8 kg (136 lb 3.2 oz)   LMP  (LMP Unknown)   SpO2 98%   BMI 22.66 kg/m²         History of Present Illness   Pt has moved back to [place].  Pt's GI surgeon recommended that she increase her creon. Pt had surgery for gastroparesis. Pt is still using marinol to stimulate her appetite even after gastric pacemaker.     Pt moved back to [place] after breakup with partner because of family in this area.     Pt will be seeing Endocrine in November for DM. Pt needs refill of the humalog until then.   Pt is using insulin pump.   Pt need referral for Ophthalmology.     Pt needs to a cranial prosthetic device(wig) for hair thinning and loss from chronic illness.     Last lipids were elevated.     Pt needs flu shot.  Pt is due for mammogram  Pt needs hep C screening.     Pt needs refill of buspar for anxiety. Pt is doing well with this  The following portions of the patient's history were reviewed and updated as appropriate: allergies, current medications, past family history, past medical history, past social history, past surgical history and problem list.    Past Medical History:   Diagnosis Date   • Abdominal pain    • Absence of pancreas, acquired    • Allergic    • Anemia    • Anxiety    • Asthma    • Bile reflux gastritis    • Chronic illness    • Contact dermatitis     due to plants   • Depression    • Diabetes mellitus (CMS/[facility])    • Eating disorder     Resolved   • Encounter for dental examination 07/2014   • Gastroparesis    • GERD (gastroesophageal reflux disease)    • History of medical problems 12/1/2011    Gastroparesis   • Hyperlipidemia    • Incisional hernia    • Insomnia  "   • Iron deficiency    • Kidney stone     Left kidney   • Myeloid leukemia (CMS/HCC)    • Osteopenia    • Pancreatitis    • Pneumonia 1/1/2017   • Post-splenectomy    • Vitamin D deficiency       Past Surgical History:   Procedure Laterality Date   • ABDOMINAL SURGERY     • CERVICAL DISCECTOMY ANTERIOR     • CHOLECYSTECTOMY     • COLONOSCOPY     • COSMETIC SURGERY      Rhinoplasty   • GALLBLADDER SURGERY     • GASTRIC STIMULATOR IMPLANT SURGERY  12/17/2018    [facility]   • HERNIA MESH REMOVAL  2014    abdominal hernia with mesh    • OTHER SURGICAL HISTORY      duodeum removed   • OTHER SURGICAL HISTORY      jejunem removed   • PANCREAS SURGERY      pancreas removal   • SMALL INTESTINE SURGERY  12/1/2011    [facility]   • STOMACH SURGERY      distal stomach    • TOTAL ABDOMINAL HYSTERECTOMY WITH SALPINGO OOPHORECTOMY     • TUBAL ABDOMINAL LIGATION     • UPPER GASTROINTESTINAL ENDOSCOPY  10/25/2017    Dr Mehta, gastritis, eosinphils in esophagus      Family History   Problem Relation Age of Onset   • Osteoporosis Mother    • Cancer Mother         small bowel cancer   • Hashimoto's thyroiditis Mother    • Arthritis Mother    • Depression Mother    • Thyroid disease Mother         Hashimoto Disease   • Lung cancer Father    • Alcohol abuse Father    • Cancer Father         Lung   • Liver disease Father    • Asthma Sister       Social History     Socioeconomic History   • Marital status: Single     Spouse name: Not on file   • Number of children: Not on file   • Years of education: Not on file   • Highest education level: Not on file   Tobacco Use   • Smoking status: Never Smoker   • Smokeless tobacco: Never Used   Substance and Sexual Activity   • Alcohol use: No   • Drug use: No   • Sexual activity: Not Currently     Partners: Male     Birth control/protection: Post-menopausal      Allergies   Allergen Reactions   • Aspirin Hives   • Phenergan [Promethazine Hcl]      Anxiety/relessness   • Reglan [Metoclopramide]       Involuntary movement       Review of Systems   Constitutional: Negative.  Negative for chills, diaphoresis, fatigue and fever.   HENT: Negative.  Negative for ear pain, nosebleeds, postnasal drip, rhinorrhea, sinus pressure, sneezing and sore throat.    Eyes: Positive for visual disturbance. Negative for redness and itching.   Respiratory: Negative.  Negative for cough, shortness of breath and wheezing.    Cardiovascular: Negative.  Negative for chest pain and palpitations.   Gastrointestinal: Positive for constipation, nausea and vomiting. Negative for abdominal pain and diarrhea.   Endocrine: Negative.  Negative for cold intolerance and heat intolerance.   Genitourinary: Negative.  Negative for dysuria, frequency, hematuria and urgency.   Musculoskeletal: Negative.  Negative for arthralgias, back pain and neck pain.   Skin: Negative.  Negative for color change and rash.   Allergic/Immunologic: Negative.  Negative for environmental allergies.   Neurological: Negative.  Negative for dizziness, syncope, light-headedness and headaches.   Hematological: Negative.  Negative for adenopathy. Does not bruise/bleed easily.   Psychiatric/Behavioral: Negative.  Negative for dysphoric mood. The patient is not nervous/anxious.        Objective   Physical Exam   Constitutional: She is oriented to person, place, and time. She appears well-developed.   Thinning hair   HENT:   Head: Normocephalic.   Right Ear: External ear normal.   Left Ear: External ear normal.   Nose: Nose normal.   Eyes: Conjunctivae, EOM and lids are normal. Pupils are equal, round, and reactive to light.   Neck: Trachea normal and normal range of motion. Neck supple. Carotid bruit is not present. No thyroid mass and no thyromegaly present.   Cardiovascular: Normal rate and regular rhythm.   No murmur heard.  Pulmonary/Chest: Effort normal and breath sounds normal. No respiratory distress. She has no decreased breath sounds. She has no wheezes. She has no  rhonchi. She has no rales. She exhibits no tenderness.   Abdominal: Soft. Bowel sounds are normal. She exhibits no mass. There is generalized tenderness. There is no rebound and no guarding.   Musculoskeletal: Normal range of motion.   Neurological: She is alert and oriented to person, place, and time.   Skin: Skin is warm and dry.   Psychiatric: She has a normal mood and affect. Her behavior is normal.   Nursing note and vitals reviewed.      Assessment/Plan   Renée was seen today for diabetes and referral for eye doctor.    Diagnoses and all orders for this visit:    Type 1 diabetes mellitus with other specified complication (CMS/HCC)  -     insulin lispro (HUMALOG) 100 UNIT/ML injection; Use 1 unit per 30 in insulin pump continuous.  -     Ambulatory Referral for Diabetic Eye Exam-Ophthalmology  -     TSH    Post-pancreatectomy diabetes (CMS/HCC)  -     POC Glycosylated Hemoglobin (Hb A1C)  -     pancrelipase, Lip-Prot-Amyl, (CREON) 56846-28267 units capsule delayed-release particles capsule; Take 4-5 tablets tid with meals and 2-3 with snack    Need for vaccination  -     Flucelvax Quad=>4Years (5570-3197)    Postsurgical malabsorption  -     pancrelipase, Lip-Prot-Amyl, (CREON) 70120-48886 units capsule delayed-release particles capsule; Take 4-5 tablets tid with meals and 2-3 with snack  -     Prealbumin    Acquired total absence of pancreas  -     pancrelipase, Lip-Prot-Amyl, (CREON) 14290-19167 units capsule delayed-release particles capsule; Take 4-5 tablets tid with meals and 2-3 with snack    Hair loss  -     Prosthetic Cranial    Encounter for screening mammogram for malignant neoplasm of breast  -     Mammo Screening Digital Tomosynthesis Bilateral With CAD; Future    Need for hepatitis C screening test  -     Hepatitis C Antibody    Leukocytosis, unspecified type  -     CBC & Differential  -     CBC Auto Differential    Hyperlipidemia, unspecified hyperlipidemia type  -     Comprehensive Metabolic  Panel  -     Lipid Panel    Anxiety  -     busPIRone (BUSPAR) 5 MG tablet; Take 1 tablet by mouth 2 (Two) Times a Day.    Decrease in appetite  -     dronabinol (MARINOL) 5 MG capsule; Take 1 capsule by mouth 2 (Two) Times a Day Before Meals.    Diabetic gastroparesis associated with type 1 diabetes mellitus (CMS/HCC)  -     dronabinol (MARINOL) 5 MG capsule; Take 1 capsule by mouth 2 (Two) Times a Day Before Meals.    Presence of gastric pacemaker    Discussed Shingrix vaccine with pt,  that is currently available at the pharmacy.  Discussed hep A vaccine, and tdap vaccine.                   Current Outpatient Medications:   •  ALPHA LIPOIC ACID PO, Take 400 mg by mouth Daily., Disp: , Rfl:   •  B Complex Vitamins (VITAMIN B COMPLEX PO), Take 1 tablet by mouth Daily., Disp: , Rfl:   •  Biotin 50590 MCG tablet, Take 1 tablet by mouth Daily., Disp: , Rfl:   •  busPIRone (BUSPAR) 5 MG tablet, Take 1 tablet by mouth 2 (Two) Times a Day., Disp: 60 tablet, Rfl: 5  •  coenzyme Q10 50 MG capsule capsule, Take 50 mg by mouth Daily., Disp: , Rfl:   •  desvenlafaxine (PRISTIQ) 100 MG 24 hr tablet, Take 1 tablet by mouth Daily., Disp: 30 tablet, Rfl: 5  •  dronabinol (MARINOL) 5 MG capsule, Take 1 capsule by mouth 2 (Two) Times a Day Before Meals., Disp: 60 capsule, Rfl: 2  •  glucagon (GLUCAGON EMERGENCY) 1 MG injection, Inject 1 mg into the appropriate muscle as directed by prescriber 1 (One) Time As Needed for Low Blood Sugar for up to 1 dose., Disp: 1 kit, Rfl: 11  •  glucose blood test strip, Used to test blood sugar 4 times daily for diabetes. E10.9, Disp: 150 each, Rfl: 12  •  insulin lispro (HumaLOG) 100 UNIT/ML patient supplied pump, Inject  under the skin Continuous. 1 unit per 25, Disp: , Rfl:   •  MAGNESIUM GLYCINATE PLUS PO, Take 1 tablet by mouth Daily., Disp: , Rfl:   •  Medium Chain Triglycerides (MCT OIL PO), Take 15 mL by mouth Daily., Disp: , Rfl:   •  Multiple Vitamins-Minerals (AQUADEKS PO), Take  by  mouth., Disp: , Rfl:   •  NON FORMULARY, 2 g 3 (Three) Times a Day. polyglycoplex protein, Disp: , Rfl:   •  ondansetron (ZOFRAN) 4 MG tablet, TAKE ONE TABLET BY MOUTH EVERY 8 HOURS AS NEEDED FOR NAUSEA AND VOMITING, Disp: 60 tablet, Rfl: 0  •  pancrelipase, Lip-Prot-Amyl, (CREON) 49797-75552 units capsule delayed-release particles capsule, Take 4-5 tablets tid with meals and 2-3 with snack, Disp: 720 capsule, Rfl: 4  •  pantoprazole (PROTONIX) 40 MG EC tablet, Take 1 tablet by mouth Daily. Take first thing in the morning on an empty stomach.  Wait at least 30 min to 1hr before eating., Disp: 30 tablet, Rfl: 5  •  Probiotic Product (PROBIOTIC DAILY PO), Take  by mouth., Disp: , Rfl:   •  vitamin C (ASCORBIC ACID) 500 MG tablet, Take 500 mg by mouth Daily., Disp: , Rfl:   •  vitamin D (ERGOCALCIFEROL) 64557 UNITS capsule capsule, TAKE ONE CAPSULE BY MOUTH EVERY 7 DAYS, Disp: 4 capsule, Rfl: 0  •  insulin lispro (HUMALOG) 100 UNIT/ML injection, Use 1 unit per 30 in insulin pump continuous., Disp: 10 mL, Rfl: 5    Return in about 3 months (around 12/11/2019), or if symptoms worsen or fail to improve, for Recheck.    Hemoglobin A1C   Date Value Ref Range Status   09/11/2019 7.0 % Final   05/22/2019 6.4 % Final   05/11/2017 6.30 (H) 4.80 - 5.60 % Final   12/20/2016 7.30 (H) 4.80 - 5.60 % Final

## 2019-09-12 ENCOUNTER — TELEPHONE (OUTPATIENT)
Dept: INTERNAL MEDICINE | Facility: CLINIC | Age: 59
End: 2019-09-12

## 2019-09-12 DIAGNOSIS — D72.829 LEUKOCYTOSIS, UNSPECIFIED TYPE: Primary | ICD-10-CM

## 2019-09-12 LAB
ALBUMIN SERPL-MCNC: 4.5 G/DL (ref 3.5–5.2)
ALBUMIN/GLOB SERPL: 1.4 G/DL
ALP SERPL-CCNC: 174 U/L (ref 39–117)
ALT SERPL W P-5'-P-CCNC: 20 U/L (ref 1–33)
ANION GAP SERPL CALCULATED.3IONS-SCNC: 12.4 MMOL/L (ref 5–15)
AST SERPL-CCNC: 21 U/L (ref 1–32)
BASOPHILS # BLD AUTO: 0.08 10*3/MM3 (ref 0–0.2)
BASOPHILS NFR BLD AUTO: 0.6 % (ref 0–1.5)
BILIRUB SERPL-MCNC: 0.3 MG/DL (ref 0.2–1.2)
BUN BLD-MCNC: 12 MG/DL (ref 8–23)
BUN/CREAT SERPL: 14.5 (ref 7–25)
CALCIUM SPEC-SCNC: 9.5 MG/DL (ref 8.6–10.5)
CHLORIDE SERPL-SCNC: 97 MMOL/L (ref 98–107)
CHOLEST SERPL-MCNC: 229 MG/DL (ref 0–200)
CO2 SERPL-SCNC: 27.6 MMOL/L (ref 22–29)
CREAT BLD-MCNC: 0.83 MG/DL (ref 0.57–1)
DEPRECATED RDW RBC AUTO: 46.7 FL (ref 37–54)
EOSINOPHIL # BLD AUTO: 0.73 10*3/MM3 (ref 0–0.4)
EOSINOPHIL NFR BLD AUTO: 5.4 % (ref 0.3–6.2)
ERYTHROCYTE [DISTWIDTH] IN BLOOD BY AUTOMATED COUNT: 13.6 % (ref 12.3–15.4)
GFR SERPL CREATININE-BSD FRML MDRD: 70 ML/MIN/1.73
GLOBULIN UR ELPH-MCNC: 3.3 GM/DL
GLUCOSE BLD-MCNC: 219 MG/DL (ref 65–99)
HCT VFR BLD AUTO: 45.5 % (ref 34–46.6)
HCV AB SER DONR QL: NORMAL
HDLC SERPL-MCNC: 50 MG/DL (ref 40–60)
HGB BLD-MCNC: 13.9 G/DL (ref 12–15.9)
IMM GRANULOCYTES # BLD AUTO: 0.05 10*3/MM3 (ref 0–0.05)
IMM GRANULOCYTES NFR BLD AUTO: 0.4 % (ref 0–0.5)
LDLC SERPL CALC-MCNC: 136 MG/DL (ref 0–100)
LDLC/HDLC SERPL: 2.72 {RATIO}
LYMPHOCYTES # BLD AUTO: 4.85 10*3/MM3 (ref 0.7–3.1)
LYMPHOCYTES NFR BLD AUTO: 35.7 % (ref 19.6–45.3)
MCH RBC QN AUTO: 28.7 PG (ref 26.6–33)
MCHC RBC AUTO-ENTMCNC: 30.5 G/DL (ref 31.5–35.7)
MCV RBC AUTO: 93.8 FL (ref 79–97)
MONOCYTES # BLD AUTO: 1.53 10*3/MM3 (ref 0.1–0.9)
MONOCYTES NFR BLD AUTO: 11.3 % (ref 5–12)
NEUTROPHILS # BLD AUTO: 6.36 10*3/MM3 (ref 1.7–7)
NEUTROPHILS NFR BLD AUTO: 46.6 % (ref 42.7–76)
NRBC BLD AUTO-RTO: 0 /100 WBC (ref 0–0.2)
PLATELET # BLD AUTO: 488 10*3/MM3 (ref 140–450)
PMV BLD AUTO: 12.6 FL (ref 6–12)
POTASSIUM BLD-SCNC: 4.6 MMOL/L (ref 3.5–5.2)
PREALB SERPL-MCNC: 18.2 MG/DL (ref 20–40)
PROT SERPL-MCNC: 7.8 G/DL (ref 6–8.5)
RBC # BLD AUTO: 4.85 10*6/MM3 (ref 3.77–5.28)
SODIUM BLD-SCNC: 137 MMOL/L (ref 136–145)
TRIGL SERPL-MCNC: 216 MG/DL (ref 0–150)
TSH SERPL DL<=0.05 MIU/L-ACNC: 1.95 UIU/ML (ref 0.27–4.2)
VLDLC SERPL-MCNC: 43.2 MG/DL (ref 5–40)
WBC NRBC COR # BLD: 13.6 10*3/MM3 (ref 3.4–10.8)

## 2019-09-12 NOTE — TELEPHONE ENCOUNTER
KAMRAN WOULD LIKE REFILL SENT IN WITH  INSULIN LISPRO NEEDS A MAX DAILY DOSE IN ORDER FOR INS TO PAY.

## 2019-09-13 ENCOUNTER — TELEPHONE (OUTPATIENT)
Dept: INTERNAL MEDICINE | Facility: CLINIC | Age: 59
End: 2019-09-13

## 2019-09-13 NOTE — TELEPHONE ENCOUNTER
Pt states she is using max of 15units. LVM at [facility] to let pharmacist know.

## 2019-09-14 DIAGNOSIS — K31.84 GASTROPARESIS: ICD-10-CM

## 2019-09-16 ENCOUNTER — APPOINTMENT (OUTPATIENT)
Dept: MAMMOGRAPHY | Facility: HOSPITAL | Age: 59
End: 2019-09-16

## 2019-09-16 RX ORDER — ONDANSETRON 4 MG/1
TABLET, FILM COATED ORAL
Qty: 60 TABLET | Refills: 0 | Status: SHIPPED | OUTPATIENT
Start: 2019-09-16 | End: 2019-11-04 | Stop reason: SDUPTHER

## 2019-11-04 ENCOUNTER — TELEPHONE (OUTPATIENT)
Dept: INTERNAL MEDICINE | Facility: CLINIC | Age: 59
End: 2019-11-04

## 2019-11-04 DIAGNOSIS — K31.84 GASTROPARESIS: ICD-10-CM

## 2019-11-04 DIAGNOSIS — F32.A DEPRESSION, UNSPECIFIED DEPRESSION TYPE: ICD-10-CM

## 2019-11-04 RX ORDER — DESVENLAFAXINE 100 MG/1
TABLET, EXTENDED RELEASE ORAL
Qty: 90 TABLET | Refills: 1 | Status: SHIPPED | OUTPATIENT
Start: 2019-11-04 | End: 2020-04-23

## 2019-11-04 RX ORDER — ONDANSETRON 4 MG/1
TABLET, FILM COATED ORAL
Qty: 60 TABLET | Refills: 0 | Status: SHIPPED | OUTPATIENT
Start: 2019-11-04 | End: 2019-12-14 | Stop reason: SDUPTHER

## 2019-11-04 NOTE — TELEPHONE ENCOUNTER
Regarding: RE: Non-Urgent Medical Question  Contact: [phone]  ----- Message from Mychart, Generic sent at 11/1/2019  9:57 AM EDT -----    The fax for Dr. Torres’s letter can go to: [facility] Fax #:  [phone]  Please be sure to write on the fax “[email or web address] Estrada”.    Is it possible for me to have a copy of the letter for my medical file?    Thx so much!     Renée  ----- Message -----  From: Karla Torres MD  Sent: 10/31/2019  6:20 PM EDT  To: Renée Laurent  Subject: RE: Non-Urgent Medical Question  The letter is in the system without a signature.  We will have staff fax in AM if you have hotel fax    ----- Message -----     From: Renée Laurent     Sent: 10/30/2019  1:34 PM EDT       To: Karla Torres MD  Subject: Non-Urgent Medical Question    Dr. Torres,    I was supposed to be attending a conference in [place] this Th-Sun, but my gastroparesis symptoms and pain are preventing me from making the 5-1/2 drive. The hot will process a full refund for me if I provide them with a physician’s note explaining that my health is the reason I had to cancel. Would you be able to write up a brief note to that effect and email it to me so I can send it to them to process my refund?    Hot Name: [facility], [address] [place] [address]. And the reservation is under Renée Gardiner not Renée Laurent since I only use my  name for anything medical. I’ve yet to fully change my name over to [email or web address].    My email:  [email or web address]

## 2019-11-06 ENCOUNTER — TELEPHONE (OUTPATIENT)
Dept: INTERNAL MEDICINE | Facility: CLINIC | Age: 59
End: 2019-11-06

## 2019-11-06 ENCOUNTER — PATIENT MESSAGE (OUTPATIENT)
Dept: INTERNAL MEDICINE | Facility: CLINIC | Age: 59
End: 2019-11-06

## 2019-11-06 NOTE — TELEPHONE ENCOUNTER
PATIENT NEEDS TO R/S HER NP APPT WITH JUNE. SHE HAD TO CANCEL HER APPT THIS MORNING AS SHE IS ON HER WAY TO HOSPITAL WITH MEDICAL EMERGENCY.     CALL BACK [phone]

## 2019-11-07 ENCOUNTER — TELEPHONE (OUTPATIENT)
Dept: INTERNAL MEDICINE | Facility: CLINIC | Age: 59
End: 2019-11-07

## 2019-11-07 NOTE — TELEPHONE ENCOUNTER
VESNA,    PATIENT NEEDS TO R/S THE NP APPT SHE HAD TO CANCEL WITH VEDRANA. OFFERED VEDRANA'S NEXT AVAILABLE BUT SHE WANTED TO SEE IF IT IS POSSIBLE TO GET IN SOONER WITH ANYONE AVAILABLE. SHE IS NEW [place] AND WILL NEED MED REFILLS SOON.     CALL BACK [phone]

## 2019-11-18 ENCOUNTER — APPOINTMENT (OUTPATIENT)
Dept: MAMMOGRAPHY | Facility: HOSPITAL | Age: 59
End: 2019-11-18

## 2019-12-02 PROBLEM — D72.829 LEUKOCYTOSIS: Status: ACTIVE | Noted: 2019-12-02

## 2019-12-14 DIAGNOSIS — K31.84 GASTROPARESIS: ICD-10-CM

## 2019-12-16 RX ORDER — ONDANSETRON 4 MG/1
TABLET, FILM COATED ORAL
Qty: 60 TABLET | Refills: 0 | Status: SHIPPED | OUTPATIENT
Start: 2019-12-16 | End: 2020-05-04 | Stop reason: SDUPTHER

## 2019-12-18 DIAGNOSIS — K21.9 GASTROESOPHAGEAL REFLUX DISEASE, ESOPHAGITIS PRESENCE NOT SPECIFIED: ICD-10-CM

## 2019-12-18 RX ORDER — PANTOPRAZOLE SODIUM 40 MG/1
TABLET, DELAYED RELEASE ORAL
Qty: 90 TABLET | Refills: 1 | Status: SHIPPED | OUTPATIENT
Start: 2019-12-18 | End: 2020-05-28

## 2019-12-24 ENCOUNTER — OFFICE VISIT (OUTPATIENT)
Dept: INTERNAL MEDICINE | Facility: CLINIC | Age: 59
End: 2019-12-24

## 2019-12-24 VITALS
WEIGHT: 141.8 LBS | BODY MASS INDEX: 23.63 KG/M2 | HEART RATE: 71 BPM | HEIGHT: 65 IN | OXYGEN SATURATION: 95 % | DIASTOLIC BLOOD PRESSURE: 70 MMHG | TEMPERATURE: 97.6 F | SYSTOLIC BLOOD PRESSURE: 118 MMHG

## 2019-12-24 DIAGNOSIS — K91.2 POSTSURGICAL MALABSORPTION: ICD-10-CM

## 2019-12-24 DIAGNOSIS — Z78.0 MENOPAUSE: ICD-10-CM

## 2019-12-24 DIAGNOSIS — R53.83 OTHER FATIGUE: ICD-10-CM

## 2019-12-24 DIAGNOSIS — E10.69 TYPE 1 DIABETES MELLITUS WITH OTHER SPECIFIED COMPLICATION (HCC): Primary | ICD-10-CM

## 2019-12-24 DIAGNOSIS — F43.21 ADJUSTMENT DISORDER WITH DEPRESSED MOOD: ICD-10-CM

## 2019-12-24 DIAGNOSIS — K31.84 GASTROPARESIS: ICD-10-CM

## 2019-12-24 DIAGNOSIS — Z96.89 PRESENCE OF GASTRIC PACEMAKER: ICD-10-CM

## 2019-12-24 DIAGNOSIS — Z90.410 ACQUIRED TOTAL ABSENCE OF PANCREAS: ICD-10-CM

## 2019-12-24 LAB — HBA1C MFR BLD: 6.3 %

## 2019-12-24 PROCEDURE — 99213 OFFICE O/P EST LOW 20 MIN: CPT | Performed by: FAMILY MEDICINE

## 2019-12-24 PROCEDURE — 83036 HEMOGLOBIN GLYCOSYLATED A1C: CPT | Performed by: FAMILY MEDICINE

## 2019-12-24 NOTE — PROGRESS NOTES
"Answers for HPI/ROS submitted by the patient on 12/24/2019   How long have you been having these symptoms?: 1-4 weeks ago    Leni Laurent is a 59 y.o. female.     Chief Complaint   Patient presents with   • referral to palliative care   • Diabetes     sees rajan in 2 weeks   • Depression     f/u   • Anxiety       Visit Vitals  /70 (BP Location: Right arm, Cuff Size: Adult)   Pulse 71   Temp 97.6 °F (36.4 °C)   Ht 165.1 cm (65\")   Wt 64.3 kg (141 lb 12.8 oz)   LMP  (LMP Unknown)   SpO2 95%   BMI 23.60 kg/m²       Wt Readings from Last 3 Encounters:   12/24/19 64.3 kg (141 lb 12.8 oz)   09/11/19 61.8 kg (136 lb 3.2 oz)   05/22/19 63.1 kg (139 lb 3.2 oz)         Diabetes   She presents for her follow-up diabetic visit. She has type 1 diabetes mellitus. Her disease course has been improving. Hypoglycemia symptoms include confusion, sweats and tremors. Pertinent negatives for hypoglycemia include no dizziness, headaches or nervousness/anxiousness. (No symptoms until sugar 40) Associated symptoms include fatigue. Pertinent negatives for diabetes include no blurred vision, no chest pain, no foot paresthesias, no foot ulcerations, no polydipsia, no polyphagia, no polyuria, no visual change, no weakness and no weight loss. Pertinent negatives for diabetic complications include no CVA, heart disease, nephropathy, peripheral neuropathy, PVD or retinopathy. Risk factors for coronary artery disease include dyslipidemia and post-menopausal. Current diabetic treatment includes intensive insulin program, diet and insulin pump. Her weight is increasing steadily. She is following a generally healthy diet. Meal planning includes avoidance of concentrated sweets. She participates in exercise intermittently. Her home blood glucose trend is decreasing steadily. Her breakfast blood glucose range is generally  mg/dl. An ACE inhibitor/angiotensin II receptor blocker is not being taken. She does not see a " podiatrist.Eye exam is not current (pending).   Depression   Visit Type: follow-up  Patient presents with the following symptoms: confusion, fatigue, insomnia, shortness of breath and weight gain.  Patient is not experiencing: anhedonia, chest pain, choking sensation, compulsions, decreased concentration, depressed mood, dizziness, dry mouth, excessive worry, feelings of hopelessness, feelings of worthlessness, hypersomnia, hyperventilation, irritability, malaise, memory impairment, muscle tension, nausea, nervousness/anxiety, obsessions, palpitations, panic, psychomotor agitation, psychomotor retardation, restlessness, suicidal ideas, suicidal planning, thoughts of death and weight loss.  Frequency of symptoms: occasionally (situational, worse recently)   Severity: mild   Hours of sleep per night: 28 minutes at stretch, total 3-4 hours, because of pain and nausea.  Sleep quality: poor  Nighttime awakenings: many  Compliance with medications:  %        Anxiety   Presents for follow-up visit. Symptoms include confusion, insomnia, nausea and shortness of breath. Patient reports no chest pain, compulsions, decreased concentration, depressed mood, dizziness, dry mouth, excessive worry, feeling of choking, hyperventilation, irritability, malaise, muscle tension, nervous/anxious behavior, obsessions, palpitations, panic, restlessness or suicidal ideas. Symptoms occur occasionally. The severity of symptoms is mild. The patient sleeps 4 hours per night. The quality of sleep is poor. Nighttime awakenings: several.     Her past medical history is significant for depression. Compliance with medications is %.    pt would like to go to Palliative care because of multiple symptoms and pain.  Last lab the prealbumin was low. Pt has increased dairy and peanut butter.  Pt has had part of distal stomach and duodenum removed and has gastric pacemaker.     Pt has some nausea and vomiting.  Pt has been off the marinol for 3  weeks. Insurance will not cover. Zofran not covering symptoms.  Pt has past hx of CML.     Anxiety and depression are getting worse. Pt having problems on some days that keep per from getting out of the house.       Pt will see Endo in 2 weeks for diabetes.     Buspar helping anxiety.   The following portions of the patient's history were reviewed and updated as appropriate: allergies, current medications, past family history, past medical history, past social history, past surgical history and problem list.    Past Medical History:   Diagnosis Date   • Abdominal pain    • Absence of pancreas, acquired    • Allergic    • Anemia    • Anxiety    • Asthma    • Bile reflux gastritis    • Chronic illness    • Contact dermatitis     due to plants   • Depression    • Diabetes mellitus (CMS/HCC)    • Diabetes mellitus type I (CMS/HCC) 2011    Type 3c - surgically induced   • Eating disorder     Resolved   • Encounter for dental examination 07/2014   • Gastroparesis    • GERD (gastroesophageal reflux disease)    • History of medical problems 12/1/2011    Gastroparesis   • Hyperlipidemia    • Hypoglycemia 2011   • Incisional hernia    • Insomnia    • Iron deficiency    • Kidney stone     Left kidney   • Myeloid leukemia (CMS/HCC)    • Osteopenia    • Osteoporosis 2015    Osteopenia   • Pancreatitis    • Pneumonia 1/1/2017   • Post-splenectomy    • Vitamin D deficiency       Past Surgical History:   Procedure Laterality Date   • ABDOMINAL SURGERY     • CERVICAL DISCECTOMY ANTERIOR     • CHOLECYSTECTOMY     • COLONOSCOPY     • COSMETIC SURGERY      Rhinoplasty   • GALLBLADDER SURGERY     • GASTRIC STIMULATOR IMPLANT SURGERY  12/17/2018    [facility]   • HERNIA MESH REMOVAL  2014    abdominal hernia with mesh    • OTHER SURGICAL HISTORY      duodeum removed   • OTHER SURGICAL HISTORY      jejunem removed   • PANCREAS SURGERY      pancreas removal   • SMALL INTESTINE SURGERY  12/1/2011    [facility]   • STOMACH SURGERY       distal stomach    • TOTAL ABDOMINAL HYSTERECTOMY WITH SALPINGO OOPHORECTOMY     • TUBAL ABDOMINAL LIGATION     • UPPER GASTROINTESTINAL ENDOSCOPY  10/25/2017    Dr Mehta, gastritis, eosinphils in esophagus      Family History   Problem Relation Age of Onset   • Osteoporosis Mother    • Cancer Mother         CLL   • Hashimoto's thyroiditis Mother    • Arthritis Mother    • Depression Mother    • Thyroid disease Mother         Hashimoto’s   • Lung cancer Father    • Alcohol abuse Father    • Cancer Father         Lung   • Liver disease Father    • Asthma Sister       Social History     Socioeconomic History   • Marital status: Single     Spouse name: Not on file   • Number of children: Not on file   • Years of education: Not on file   • Highest education level: Not on file   Tobacco Use   • Smoking status: Never Smoker   • Smokeless tobacco: Never Used   Substance and Sexual Activity   • Alcohol use: No   • Drug use: No   • Sexual activity: Not Currently     Partners: Male     Birth control/protection: Post-menopausal, None      Allergies   Allergen Reactions   • Aspirin Hives   • Phenergan [Promethazine Hcl]      Anxiety/relessness   • Reglan [Metoclopramide]      Involuntary movement       Review of Systems   Constitutional: Positive for diaphoresis, fatigue, unexpected weight change and weight gain. Negative for chills, fever, irritability and weight loss.   HENT: Positive for dental problem (teeth are breaking and falling out). Negative for ear pain, nosebleeds, postnasal drip, rhinorrhea, sinus pressure, sneezing and sore throat.    Eyes: Negative.  Negative for blurred vision, redness and itching.   Respiratory: Positive for shortness of breath. Negative for cough, choking and wheezing.    Cardiovascular: Negative.  Negative for chest pain, palpitations and leg swelling.   Gastrointestinal: Positive for abdominal distention, abdominal pain, constipation, nausea and vomiting. Negative for diarrhea.   Endocrine:  Positive for heat intolerance. Negative for cold intolerance, polydipsia, polyphagia and polyuria.   Genitourinary: Negative.  Negative for dysuria, frequency, hematuria and urgency.   Musculoskeletal: Positive for neck pain (residual pain since failed fusion 1998). Negative for arthralgias, back pain, gait problem, joint swelling, myalgias and neck stiffness.   Skin: Negative.  Negative for color change and rash.        Dry skin, hair loss   Allergic/Immunologic: Negative.  Negative for environmental allergies.   Neurological: Positive for tremors. Negative for dizziness, syncope, weakness, light-headedness and headaches.   Hematological: Negative for adenopathy. Bruises/bleeds easily.   Psychiatric/Behavioral: Positive for confusion and sleep disturbance. Negative for decreased concentration, dysphoric mood and suicidal ideas. The patient has insomnia. The patient is not nervous/anxious.        Objective   Physical Exam   Constitutional: She is oriented to person, place, and time. She appears well-developed.   HENT:   Head: Normocephalic.   Right Ear: External ear normal.   Left Ear: External ear normal.   Nose: Nose normal.   Eyes: Pupils are equal, round, and reactive to light. Conjunctivae, EOM and lids are normal.   Neck: Trachea normal and normal range of motion. Neck supple. Carotid bruit is not present. No thyroid mass and no thyromegaly present.   Cardiovascular: Normal rate and regular rhythm.   No murmur heard.  Pulmonary/Chest: Effort normal and breath sounds normal. No respiratory distress. She has no decreased breath sounds. She has no wheezes. She has no rhonchi. She has no rales. She exhibits no tenderness.   Abdominal: Soft. Bowel sounds are normal. There is generalized tenderness.   Musculoskeletal: Normal range of motion.    Renée had a diabetic foot exam performed (nl) today.   During the foot exam she had a monofilament test performed (nl).  Vascular Status -  Her right foot exhibits normal  foot vasculature  and no edema. Her left foot exhibits normal foot vasculature  and no edema.  Skin Integrity  -  Her right foot skin is intact.Her left foot skin is intact..  Neurological: She is alert and oriented to person, place, and time.   Skin: Skin is warm and dry.   Psychiatric: She has a normal mood and affect. Her behavior is normal.   Nursing note and vitals reviewed.      Assessment/Plan   Renée was seen today for referral to palliative care, diabetes, depression and anxiety.    Diagnoses and all orders for this visit:    Type 1 diabetes mellitus with other specified complication (CMS/[facility])  -     POC Glycosylated Hemoglobin (Hb A1C)  -     Ambulatory Referral to Palliative Care    Menopause  -     DEXA Bone Density Axial; Future  -     Ambulatory Referral to Palliative Care    Adjustment disorder with depressed mood  -     Ambulatory Referral to Palliative Care    Other fatigue  -     Ambulatory Referral to Palliative Care    Presence of gastric pacemaker  -     Ambulatory Referral to Palliative Care    Acquired total absence of pancreas  -     Ambulatory Referral to Palliative Care    Postsurgical malabsorption  -     Ambulatory Referral to Palliative Care    Gastroparesis  -     Ambulatory Referral to Palliative Care                   Current Outpatient Medications:   •  ALPHA LIPOIC ACID PO, Take 400 mg by mouth Daily., Disp: , Rfl:   •  B Complex Vitamins (VITAMIN B COMPLEX PO), Take 1 tablet by mouth Daily., Disp: , Rfl:   •  Biotin 00438 MCG tablet, Take 1 tablet by mouth Daily., Disp: , Rfl:   •  busPIRone (BUSPAR) 5 MG tablet, Take 1 tablet by mouth 2 (Two) Times a Day., Disp: 60 tablet, Rfl: 5  •  coenzyme Q10 50 MG capsule capsule, Take 50 mg by mouth Daily., Disp: , Rfl:   •  desvenlafaxine (PRISTIQ) 100 MG 24 hr tablet, TAKE ONE TABLET BY MOUTH DAILY, Disp: 90 tablet, Rfl: 1  •  glucagon (GLUCAGON EMERGENCY) 1 MG injection, Inject 1 mg into the appropriate muscle as directed by prescriber 1  (One) Time As Needed for Low Blood Sugar for up to 1 dose., Disp: 1 kit, Rfl: 11  •  glucose blood test strip, Used to test blood sugar 4 times daily for diabetes. E10.9, Disp: 150 each, Rfl: 12  •  insulin lispro (ADMELOG) 100 UNIT/ML injection, Inject  under the skin into the appropriate area as directed 3 (Three) Times a Day Before Meals. Sliding scale, bolus for diabetic pump, Disp: , Rfl:   •  MAGNESIUM GLYCINATE PLUS PO, Take 1 tablet by mouth Daily., Disp: , Rfl:   •  Medium Chain Triglycerides (MCT OIL PO), Take 15 mL by mouth Daily., Disp: , Rfl:   •  Multiple Vitamins-Minerals (AQUADEKS PO), Take  by mouth., Disp: , Rfl:   •  NON FORMULARY, 2 g 3 (Three) Times a Day. polyglycoplex protein, Disp: , Rfl:   •  ondansetron (ZOFRAN) 4 MG tablet, TAKE ONE TABLET BY MOUTH EVERY 8 HOURS AS NEEDED FOR NAUSEA AND VOMITING, Disp: 60 tablet, Rfl: 0  •  pancrelipase, Lip-Prot-Amyl, (CREON) 32675-79181 units capsule delayed-release particles capsule, Take 4-5 tablets tid with meals and 2-3 with snack, Disp: 720 capsule, Rfl: 4  •  pantoprazole (PROTONIX) 40 MG EC tablet, TAKE ONE TABLET BY MOUTH EVERY MORNING ON AN EMPTY STOMACH.  WAY AT LEAST 30 MINUTES TO ONE HOUR BEFORE EATING, Disp: 90 tablet, Rfl: 1  •  Probiotic Product (PROBIOTIC DAILY PO), Take  by mouth., Disp: , Rfl:   •  vitamin C (ASCORBIC ACID) 500 MG tablet, Take 500 mg by mouth Daily., Disp: , Rfl:   •  vitamin D (ERGOCALCIFEROL) 83465 UNITS capsule capsule, TAKE ONE CAPSULE BY MOUTH EVERY 7 DAYS, Disp: 4 capsule, Rfl: 0  •  dronabinol (MARINOL) 5 MG capsule, Take 1 capsule by mouth 2 (Two) Times a Day Before Meals., Disp: 60 capsule, Rfl: 2  •  insulin lispro (HUMALOG) 100 UNIT/ML injection, Use 1 unit per 30 in insulin pump continuous., Disp: 10 mL, Rfl: 5    Return in about 4 weeks (around 1/21/2020), or if symptoms worsen or fail to improve, for Recheck.    Hemoglobin A1C   Date Value Ref Range Status   12/24/2019 6.3 % Final   09/11/2019 7.0 % Final    05/22/2019 6.4 % Final   05/11/2017 6.30 (H) 4.80 - 5.60 % Final   12/20/2016 7.30 (H) 4.80 - 5.60 % Final

## 2020-01-13 DIAGNOSIS — Z51.81 THERAPEUTIC DRUG MONITORING: Primary | ICD-10-CM

## 2020-01-15 ENCOUNTER — TELEPHONE (OUTPATIENT)
Dept: INTERNAL MEDICINE | Facility: CLINIC | Age: 60
End: 2020-01-15

## 2020-01-15 NOTE — TELEPHONE ENCOUNTER
[facility]     calling to get the status of the PA they have faxed for :    dronabinol (MARINOL) 5 MG       Will fax the PA again to the office for review

## 2020-01-15 NOTE — TELEPHONE ENCOUNTER
Faxed over 5 days ago, had to have Dr. Torres fill out an appeal and will fax as soon as she completes and signs.

## 2020-02-04 ENCOUNTER — APPOINTMENT (OUTPATIENT)
Dept: MAMMOGRAPHY | Facility: HOSPITAL | Age: 60
End: 2020-02-04

## 2020-02-05 DIAGNOSIS — Z51.81 THERAPEUTIC DRUG MONITORING: Primary | ICD-10-CM

## 2020-02-11 ENCOUNTER — APPOINTMENT (OUTPATIENT)
Dept: MAMMOGRAPHY | Facility: HOSPITAL | Age: 60
End: 2020-02-11

## 2020-02-14 ENCOUNTER — TELEPHONE (OUTPATIENT)
Dept: ONCOLOGY | Facility: CLINIC | Age: 60
End: 2020-02-14

## 2020-02-14 NOTE — TELEPHONE ENCOUNTER
Pt had a new pt appt with Dr Helton today that she had to cancel pt will call back to reschedule     Just fyi

## 2020-02-20 ENCOUNTER — LAB (OUTPATIENT)
Dept: LAB | Facility: HOSPITAL | Age: 60
End: 2020-02-20

## 2020-02-20 ENCOUNTER — OFFICE VISIT (OUTPATIENT)
Dept: PALLIATIVE CARE | Facility: CLINIC | Age: 60
End: 2020-02-20

## 2020-02-20 VITALS
DIASTOLIC BLOOD PRESSURE: 64 MMHG | BODY MASS INDEX: 24.26 KG/M2 | HEART RATE: 104 BPM | WEIGHT: 145.8 LBS | OXYGEN SATURATION: 93 % | SYSTOLIC BLOOD PRESSURE: 115 MMHG

## 2020-02-20 DIAGNOSIS — R10.13 EPIGASTRIC PAIN: Primary | ICD-10-CM

## 2020-02-20 DIAGNOSIS — M85.88 OSTEOPENIA OF OTHER SITE: ICD-10-CM

## 2020-02-20 DIAGNOSIS — K91.2 POSTSURGICAL MALABSORPTION: ICD-10-CM

## 2020-02-20 DIAGNOSIS — Z51.81 THERAPEUTIC DRUG MONITORING: ICD-10-CM

## 2020-02-20 DIAGNOSIS — K91.2 POSTSURGICAL MALABSORPTION: Primary | ICD-10-CM

## 2020-02-20 DIAGNOSIS — Z02.89 PAIN MEDICATION AGREEMENT SIGNED: ICD-10-CM

## 2020-02-20 LAB
ALBUMIN SERPL-MCNC: 4.4 G/DL (ref 3.5–5.2)
ALBUMIN/GLOB SERPL: 1 G/DL
ALP SERPL-CCNC: 177 U/L (ref 39–117)
ALT SERPL W P-5'-P-CCNC: 15 U/L (ref 1–33)
AMPHET+METHAMPHET UR QL: NEGATIVE
AMPHETAMINES UR QL: NEGATIVE
ANION GAP SERPL CALCULATED.3IONS-SCNC: 12 MMOL/L (ref 5–15)
AST SERPL-CCNC: 22 U/L (ref 1–32)
BARBITURATES UR QL SCN: NEGATIVE
BENZODIAZ UR QL SCN: NEGATIVE
BILIRUB SERPL-MCNC: 0.3 MG/DL (ref 0.2–1.2)
BUN BLD-MCNC: 14 MG/DL (ref 8–23)
BUN/CREAT SERPL: 17.9 (ref 7–25)
BUPRENORPHINE SERPL-MCNC: NEGATIVE NG/ML
CALCIUM SPEC-SCNC: 9 MG/DL (ref 8.6–10.5)
CANNABINOIDS SERPL QL: POSITIVE
CHLORIDE SERPL-SCNC: 96 MMOL/L (ref 98–107)
CO2 SERPL-SCNC: 29 MMOL/L (ref 22–29)
COCAINE UR QL: NEGATIVE
CREAT BLD-MCNC: 0.78 MG/DL (ref 0.57–1)
ERYTHROCYTE [DISTWIDTH] IN BLOOD BY AUTOMATED COUNT: 14 % (ref 12.3–15.4)
GFR SERPL CREATININE-BSD FRML MDRD: 75 ML/MIN/1.73
GLOBULIN UR ELPH-MCNC: 4.3 GM/DL
GLUCOSE BLD-MCNC: 105 MG/DL (ref 65–99)
HBA1C MFR BLD: 6.6 % (ref 4.8–5.6)
HCT VFR BLD AUTO: 42.8 % (ref 34–46.6)
HGB BLD-MCNC: 13.7 G/DL (ref 12–15.9)
LYMPHOCYTES # BLD AUTO: 5.7 10*3/MM3 (ref 0.7–3.1)
LYMPHOCYTES NFR BLD AUTO: 44.2 % (ref 19.6–45.3)
MCH RBC QN AUTO: 28.5 PG (ref 26.6–33)
MCHC RBC AUTO-ENTMCNC: 32 G/DL (ref 31.5–35.7)
MCV RBC AUTO: 89.2 FL (ref 79–97)
METHADONE UR QL SCN: NEGATIVE
MONOCYTES # BLD AUTO: 0.7 10*3/MM3 (ref 0.1–0.9)
MONOCYTES NFR BLD AUTO: 5.2 % (ref 5–12)
NEUTROPHILS # BLD AUTO: 6.5 10*3/MM3 (ref 1.7–7)
NEUTROPHILS NFR BLD AUTO: 50.6 % (ref 42.7–76)
OPIATES UR QL: NEGATIVE
OXYCODONE UR QL SCN: NEGATIVE
PCP UR QL SCN: NEGATIVE
PLATELET # BLD AUTO: 392 10*3/MM3 (ref 140–450)
PMV BLD AUTO: 9.3 FL (ref 6–12)
POTASSIUM BLD-SCNC: 4.4 MMOL/L (ref 3.5–5.2)
PREALB SERPL-MCNC: 17.9 MG/DL (ref 20–40)
PROPOXYPH UR QL: NEGATIVE
PROT SERPL-MCNC: 8.7 G/DL (ref 6–8.5)
RBC # BLD AUTO: 4.8 10*6/MM3 (ref 3.77–5.28)
SODIUM BLD-SCNC: 137 MMOL/L (ref 136–145)
TRICYCLICS UR QL SCN: NEGATIVE
WBC NRBC COR # BLD: 12.8 10*3/MM3 (ref 3.4–10.8)

## 2020-02-20 PROCEDURE — 99205 OFFICE O/P NEW HI 60 MIN: CPT | Performed by: INTERNAL MEDICINE

## 2020-02-20 PROCEDURE — 36415 COLL VENOUS BLD VENIPUNCTURE: CPT

## 2020-02-20 PROCEDURE — 80053 COMPREHEN METABOLIC PANEL: CPT

## 2020-02-20 PROCEDURE — 83036 HEMOGLOBIN GLYCOSYLATED A1C: CPT

## 2020-02-20 PROCEDURE — 85025 COMPLETE CBC W/AUTO DIFF WBC: CPT

## 2020-02-20 PROCEDURE — 84134 ASSAY OF PREALBUMIN: CPT

## 2020-02-20 PROCEDURE — 80306 DRUG TEST PRSMV INSTRMNT: CPT

## 2020-02-20 RX ORDER — BUPRENORPHINE 5 UG/H
1 PATCH TRANSDERMAL WEEKLY
Qty: 4 PATCH | Refills: 0 | Status: SHIPPED | OUTPATIENT
Start: 2020-02-20 | End: 2020-03-20 | Stop reason: DRUGHIGH

## 2020-02-20 RX ORDER — OLANZAPINE 2.5 MG/1
2.5 TABLET ORAL NIGHTLY
Qty: 30 TABLET | Refills: 0 | Status: SHIPPED | OUTPATIENT
Start: 2020-02-20 | End: 2020-03-19

## 2020-02-20 NOTE — PROGRESS NOTES
Pt presented to clinic to initial visit. Pt ambulatory, vss, a&ox4, and appropriate.  Pt oriented to palliative care, prescribing practices, med counts, contact information and medication disposal agreement.  Pt main complaints are pain and nausea.  Not currently on any medication for pain. And zofran and marinol are not effective for nausea.

## 2020-02-20 NOTE — PROGRESS NOTES
Answers for HPI/ROS submitted by the patient on 2/13/2020   What is the primary reason for your visit?: Other  Please describe your symptoms.: Looking to begin palliative care to help me manage a variety of symptoms as a result of several chronic health issues. 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  Have you had these symptoms before?: Yes  How long have you been having these symptoms?: Other      Subjective   Renée Laurent is a 59 y.o. female.     History of Present Illness   PCP: Karla Torres MD  Referring Provider: Karla Torres MD  Reason for Referral: Depression, fatigue, gastroparesis    Initial visit to establish care.   Rneée is a 59yoF with h/o T1DM, gastroparesis s/p permanent gastric pacemaker placement on 12/17/2018. Also with h/o anxiety, depression.  She has had chronic abdominal pain since she was a teenager, with acute worsening in 2005 associated with significant weight loss. She was diagnosed with pancreatitis and in fact was found to have pancreatic divisum.  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  Her most troublesome symptom now is gastroparesis. She failed metoclopramide therapy due to development of Parkinsonian symptoms. She underwent placement of gastic pacemaker placement in 12/17/2018 though unfortunately has continued to have some gastroparesis.     Today, her biggest concerns are of nausea and pain.  She reports that nausea is nearly constant and associated with a few episodes of vomiting per week. Sometimes nausea can be so debilitating that it keeps her in bed for most of the day, and she has missed many appointments due to being unable to leave her home. Food intake makes nausea worse. Relieving factors include zofran, though she is taking 8mg  "q6-8h daily with only partial relief. She describes her pain as \"phantom nerve pain\" which she localizes to her back and epigastrum. This also worsens with food intake and she describes feeling \"stretching.\"      (Dr. Toro documentation from here)  Pain: complains of pain due to chronic pancreatitis, nerve pain due to nerve damage.  Epigastric bloating pain and dull ache.  Impairs sleep - sleeps only 30 min at a time then up again due to pain.  Limits her activity outside of home.  Over past 2 months, does not even leave home weekly.  Many missed medical appts due to intractable nausea +/- emesis and pain. Rates as 4-5 and can go as high as 8.  Goal would be mostly 4 \"a notch.\"     Medication management history:  H/o Fentanyl 100mcg and oxycodone, then improvement in pain s/p an intervention or surgery and gradual dose reduction to discontinuation over 8 months.  PRN Lortab for travel from primary care for travel and increase abdominalpains.  Trial Gabapentin and Lyrica with some improvement of R arm radiculopathy (h/o cervical diskectomy and fusion) but intolerant to drowsiness SE even at low dose, was not helpful for abdominal pain.  Pristiq since 2007 for chronic pain and reactive depression, does not tolerated lower dose taper due to \"brain zaps\".  Using kratom which helps some of her abdominal pain.  Has tried warm water with lemon without kratom, to see if the warm water was the therapeutic part of the regimen, but does note better abdominal pain control with the kratom.    Interventional Pain management history:  H/o cervical diskectomy and fusion and rhizotomy with some improvement.  Reports h/o 9 months improved abdominal pain with a neurolysis in [place] and/or [place].  H/o hernia repair x 2.      Baseline activity:  Gets up out of bed and works from home about 4 days/week.  For 3 days/week, she is mainly in bed and cannot tolerate even getting up to kitchen to get some tea without having to lie " down due to pain or retch.    Symptoms: Stable weight.  Frequent hypoglycemic episodes, without warning when quite low.  No constipation, nor diarrhea.    Function: Limited, works from home.  Independent, mainly by choice.    Support/Strengths:  Adult son and daughter have families.  She moved here July 2019 after end of a relationship, daughter lives here locally.  Active in advocacy groups for chronic pain patients.    Distress/Difficulties: States chronic pain and health conditions contributed to her divorce and end of a long term relationship last year.  Her son and daughter have young children and do no have daily contact with her.  However, if she reached out, they would respond.    Substance Use History: Never smoker, never alcohol intake.  Noted that her son is being treated for OUD, with good response.    ACP/Goals: Pain management, extra support.  Values purpose and contributing to society.    Labs today:  Noted normal albumin, CBC unchanged from baseline.  Elevated but stable Alk Phos.      The following portions of the patient's history were reviewed and updated as appropriate: allergies, current medications, past family history, past medical history, past social history, past surgical history and problem list.    Review of Systems  Otherwise negative except as below and as already detailed in HPI.    YADI:  Reviewed.  See scanned form in Media. No concerns.  Consistent with history.  Prescribers identified as members of care team.     Medication Counts:  Reviewed.  See RN note. Did not bring medication to appointment, initial appt    CONTROLLED SUBSTANCE TRACKING 2/20/2020   Last Yadi 2/19/2020   Report Number [id number]   ORT Initial Risk Score 2   Pill count Did not bring   Diversion Concern No   Disposal Education and Agreement [id number]       UDS:  THC, Screen, Urine   Date Value Ref Range Status   02/20/2020 Positive (A) Negative Final     Phencyclidine (PCP), Urine   Date Value Ref Range Status    02/20/2020 Negative Negative Final     Cocaine Screen, Urine   Date Value Ref Range Status   02/20/2020 Negative Negative Final     Methamphetamine, Ur   Date Value Ref Range Status   02/20/2020 Negative Negative Final     Opiate Screen   Date Value Ref Range Status   02/20/2020 Negative Negative Final     Amphetamine Screen, Urine   Date Value Ref Range Status   02/20/2020 Negative Negative Final     Benzodiazepine Screen, Urine   Date Value Ref Range Status   02/20/2020 Negative Negative Final     Tricyclic Antidepressants Screen   Date Value Ref Range Status   02/20/2020 Negative Negative Final     Methadone Screen, Urine   Date Value Ref Range Status   02/20/2020 Negative Negative Final     Barbiturates Screen, Urine   Date Value Ref Range Status   02/20/2020 Negative Negative Final     Oxycodone Screen, Urine   Date Value Ref Range Status   02/20/2020 Negative Negative Final     Propoxyphene Screen   Date Value Ref Range Status   02/20/2020 Negative Negative Final     Buprenorphine, Screen, Urine   Date Value Ref Range Status   02/20/2020 Negative Negative Final     Palliative Performance Scale  Palliative Performance Scale Score: 80%    [place] Symptom Assessment System Revised  Pain Score: 7   ESAS Tiredness Score: 9  ESAS Nausea Score: 7  ESAS Depression Score: 5  ESAS Anxiety Score: 2  ESAS Drowsiness Score: 7  ESAS Lack of Appetite Score: 3  ESAS Wellbeing Score: 4  ESAS Dyspnea Score: 5  ESAS Source of Information: patient    KODAK-7:    Over the last two weeks, how often have you been bothered by the following problems?  Feeling nervous, anxious or on edge: Not at all  Not being able to stop or control worrying: Not at all  Worrying too much about different things: Several days  Trouble Relaxing: Several days  Being so restless that it is hard to sit still: Several days  Becoming easily annoyed or irritable: Not at all  Feeling afraid as if something awful might happen: Not at all  KODAK 7 Total Score:  3    PHQ-9:  PHQ-2/PHQ-9 Depression Screening 2/20/2020   Little interest or pleasure in doing things 2   Feeling down, depressed, or hopeless 0   Trouble falling or staying asleep, or sleeping too much 3   Feeling tired or having little energy 3   Poor appetite or overeating 2   Feeling bad about yourself - or that you are a failure or have let yourself or your family down 1   Trouble concentrating on things, such as reading the newspaper or watching television 1   Moving or speaking so slowly that other people could have noticed. Or the opposite - being so fidgety or restless that you have been moving around a lot more than usual 0   Thoughts that you would be better off dead, or of hurting yourself in some way 0   Total Score 12        ECOG: (2) Ambulatory and capable of self care, unable to carry out work activity, up and about > 50% or waking hours    Objective   Physical Exam   Constitutional: She is oriented to person, place, and time. She appears well-developed and well-nourished. No distress.   Eyes: Pupils are equal, round, and reactive to light. EOM are normal. Left eye exhibits no discharge. No scleral icterus.   Cardiovascular: Normal rate, regular rhythm and normal heart sounds. Exam reveals no gallop and no friction rub.   No murmur heard.  Pulmonary/Chest: Effort normal and breath sounds normal. No stridor. No respiratory distress. She has no wheezes.   Abdominal: Bowel sounds are normal. She exhibits distension. There is tenderness in the right upper quadrant and epigastric area.   Shifting tympany most predominant LUQ   Musculoskeletal: She exhibits edema. She exhibits no tenderness or deformity.   Neurological: She is alert and oriented to person, place, and time. Coordination normal.   Skin: Skin is warm and dry. No rash noted. She is not diaphoretic. No erythema. No pallor.   Psychiatric: She has a normal mood and affect. Her behavior is normal. Judgment and thought content normal.   Nursing  note and vitals reviewed.        Assessment/Plan   Renée was seen today for appointment, follow-up and gi problem.    Diagnoses and all orders for this visit:    Epigastric pain  -     Buprenorphine (BUTRANS) 5 MCG/HR patch weekly transdermal patch; Place 1 patch on the skin as directed by provider 1 (One) Time Per Week.    Pain medication agreement signed    Osteopenia of other site    Other orders  -     OLANZapine (zyPREXA) 2.5 MG tablet; Take 1 tablet by mouth Every Night for 30 days.                   Prior authorization documentation:  Inefficacious:  Fentanyl 100mcg, oxycodone  Intolerant to side effects: Gabapentin (dose related)    Universal precautions:    ORT risk:  Low score, but son with OUD noted  Aberrant behavior:  None.  UDT as expected.  PDMP without activity  Indication:  Chronic abdominal pain, not responsive to non-opioid and non-pharmacologic therapies  OME:  N/a buprenorphine  Naloxone prescribed:  no     Advance care plan:    -  Healthcare decision making plan: adult son and daughter by law  -  MOST discussions thus far:  Not explored.  Symptom management visit, establishing rapport, not limited px, no decision point.      Patient Instructions of AVS:  1.  [facility] Reg[facility]ative Medicine - Dr. Tabitha Julian  2.  Acupuncture -  [facility] [phone]  3.  Call if you need home palliative visit  4.  Goal - increase out of bed time per week (more then 4 days/week or more hours up on bad days)      Total face to face time spent:  90 min.  Greater than 50% time spent in counseling and discussion re:  Multiple local options to continue pursuing multimodal pain management.  Unfortunately, due to her symptoms, keeping medical appts is challenging.  She is very receptive to enhancing current non-pharmacologic modalities, to seeking integrative medicine and therapeutic cognitive therapies, and medication adjustments.    Will start Butrans for her chronic daily pain.  Less GI  side effects compared to other opioids, as a partial opioid agonist.  Discussed limitations of any opioid therapy.  Discussed titration of Butrans to [place], but that beyond that, it is possible this just is not opioid responsive pain.    Will avoid PRN short acting opioid given her functional pain and concern of adhesions and higher risk opioid induced constipation with full opioid agonists.    Benefit of antidepressant and neuropathic effect with buprenorphine kappa opioid antagonist activity and less risk of opioid induced hyperalgesia.    Patient was counseled on narcotic safety and patient responsibility of medication against theft or stolen medications.  Controlled medication agreement reviewed, signed, and in chart.  1)  No early refills requests will be honored for lost or stolen medication.    2)  Patient is expected to comply with requests for random medication counts and urine drug monitoring while receiving opioid therapy.    Patient was counseled to take medications only as prescribed or instructed by prescribing physician.    1)  Patient was counseled regarding risk of overdose and polypharmacy.    2)  Patient was advised against using alcohol or driving while on narcotic medication.    3)  Patient was advised about higher risk of accidental overdose with benzodiazepine use.  4)  Patient was given instruction on safe disposal of medications.    Patient was advised of opioid side effects, including, but not limited to:   -  Physical dependence and opioid tolerance, related to duration of opioid therapy   -  Opioid induced hyperalgesia, related to duration of opioid therapy   -  Opioid use disorder (drug abuse and addiction)   -  hypogonadism and fatigue,    -  Sleep apnea,   -  osteoporosis,    -  depression,    -  Increased risk of pneumonia   - Constipation   - Altered sensorium - confusion and/or sedation   - Nausea   - Pruritis, itching   - Hyperhidrosis, excessive sweating     Advised to be  supervised for first few days while on new medication or dosages and to call for any side effect concerns.    Patient counseled about marijuana and its derivative CBD and THC.      1)  Legally prescribed THC (Dronabinol/Marinol) can be helpful for some forms of anorexia and intractable nausea and vomiting.  Potential psychotropic side effects can occur.  It is a controlled medication and patient responsibility expectations are included in controlled medication agreement.    2)  CBD (Cannabidiol) is available in over the counter supplements, usually in topical or ingested oil products.  These are legal in [place].  However, it is not FDA regulated, so dose and frequency cannot be advised from a physician.  Purity of product from fillers and pesticides cannot be guaranteed.  Patients can test positive for THC with use of these products.  Theoretical benefits for pain have been reported.      3)  Marijuana or cannabis is illegal in [place].  There is no regulation as it is illegal.  Patient resumes responsibility for any additional illicit substances that could be present.  Patient is advised not to drive while taking cannabis.   Locally, there have been reports of marijuana contaminated with fentanyl, which can lead to opioid overdose death.        Care coordination:   -  Will discuss with Supportive Care Team and [facility] Navigators potential Telemedicine Behavioral Health services to support pain psychology and/or cognitive behavioral therapy for chronic pain, any services for therapeutic massage (Reiki or other) or other integrative options (art therapy?).  Will discuss with pharmacist deprescribing of her SNRI as pt has been on this for over 15 years and unclear any benefit but clear withdrawal symptoms.  -  Discussed potential home palliative APRN visits for pharmacologic management if needed, however she is not clearly home limited at this point, hopeful symptoms will improve to increase attendance to medical  appts.  -  Encouraged phone follow up.  Can titrate Butrans no sooner than once a week for effect.  -  Follow up 1 month    Attending attestation:  Pt interviewed and examined together with HPM Fellow Dr. Kaur.  Bulk of documentation above is mine as indicated.

## 2020-02-20 NOTE — PATIENT INSTRUCTIONS
1.  [facility] Regenerative Medicine - Dr. Tabitha Julian  2.  Acupuncture -  [facility] [phone]  3.  Call if you need home palliative visit  4.  Goal - increase out of bed time per week (more then 4 days/week or more hours up on bad days)    ALWAYS bring ALL of your medications prescribed by this clinic to EVERY appointment.  If you fail to bring in any remaining controlled medication (usually a pain or anxiety medication), you may not receive a refill or replacement prescription at that appointment.      Call [phone] for questions regarding medications, refills, or plan of care on Mondays - Fridays 9am to 4pm.      You must call AT LEAST 7-10 BUSINESS DAYS in advance for any refill requests. Clinic days are Tuesday and Thursdays at this time.  Prescriptions for controlled medications will be completed on clinic days only.  Please be aware of additional insurance prior authorization processing time required for many of those medications.      Call after hours and weekends only for new or acute (not chronic) symptom issues to speak to on-call physician or nurse practitioner.  Be advised that any requests for prescriptions for controlled substances can NOT be honored after hours, including refill requests.    Call [phone] only for scheduling issues.

## 2020-02-24 ENCOUNTER — TELEPHONE (OUTPATIENT)
Dept: PALLIATIVE CARE | Facility: CLINIC | Age: 60
End: 2020-02-24

## 2020-02-24 NOTE — TELEPHONE ENCOUNTER
Called to inform patient that patches were approved over the weekend.  If pharmacy had not notified her already then she should be able to . Left call back number if there were any issues.

## 2020-02-25 ENCOUNTER — TELEPHONE (OUTPATIENT)
Dept: PALLIATIVE CARE | Facility: CLINIC | Age: 60
End: 2020-02-25

## 2020-02-25 NOTE — TELEPHONE ENCOUNTER
Left message to call back to discuss options after Supportive Care team IDT re: options for tapering off Pristiq.  Recommendations from pharmD of switching to SSRI and slow taper, and psychiatry APRN input re: her experience with tapers and considerations of liquid formulations for even smaller increments of gradual dose reduction.  Advised patient to call back at convenience to discuss with palliative RN.  Consideration of referal for psychiatry APRN visit as well.

## 2020-03-03 ENCOUNTER — TELEPHONE (OUTPATIENT)
Dept: PALLIATIVE CARE | Facility: CLINIC | Age: 60
End: 2020-03-03

## 2020-03-03 DIAGNOSIS — F32.A DEPRESSION, UNSPECIFIED DEPRESSION TYPE: Primary | ICD-10-CM

## 2020-03-03 NOTE — TELEPHONE ENCOUNTER
Returned pt phone call regarding MD message left last week. Pt open to and agreeable to following behavioral health here at clinic in order to titrate off pristiq. Instructed would ask MD to apt in and see if apt can be arranged on same day as palliative follow up. Also asked pt how Butrans patch was doing and she stated she had noticed some improvement in pain and was not currently having any negative side effects from it.

## 2020-03-04 ENCOUNTER — PATIENT MESSAGE (OUTPATIENT)
Dept: INTERNAL MEDICINE | Facility: CLINIC | Age: 60
End: 2020-03-04

## 2020-03-04 DIAGNOSIS — L65.9 HAIR LOSS: ICD-10-CM

## 2020-03-04 DIAGNOSIS — R53.83 OTHER FATIGUE: Primary | ICD-10-CM

## 2020-03-05 NOTE — TELEPHONE ENCOUNTER
Sil Melendez MA 3/5/2020 1:25 PM EST        ----- Message -----  From: Renée Laurent  Sent: 3/4/2020 6:22 PM EST  To: [facility] [address] Clinical Pool  Subject: Non-Urgent Medical Question     Dr. Torres,    I know my numbers came back in the low “normal” range for my thyroid when you last tested it, but because my mom had Hashimoto’s disease and I have pretty much all the symptoms for an underactive thyroid, I’d like to start on a thyroid med (my mom took Synthroid - not sure what’s available now since she passed away in 2008) for a trial period to see if my symptom’s get better. Hair loss (I think I’ve lost about 40% of my hair and it’s not growing back in) is concerning me the most. I don’t think it’s just my malabsorption issues. Is that possible and will the med be okay to take along with my other meds?    Thanks so much. Renée Laurent

## 2020-03-06 ENCOUNTER — PATIENT MESSAGE (OUTPATIENT)
Dept: INTERNAL MEDICINE | Facility: CLINIC | Age: 60
End: 2020-03-06

## 2020-03-06 DIAGNOSIS — K90.9 INTESTINAL MALABSORPTION, UNSPECIFIED TYPE: ICD-10-CM

## 2020-03-06 DIAGNOSIS — L65.9 HAIR LOSS: Primary | ICD-10-CM

## 2020-03-06 DIAGNOSIS — R79.89 LOW SERUM PREALBUMIN: ICD-10-CM

## 2020-03-06 DIAGNOSIS — K91.2 POSTSURGICAL MALABSORPTION: ICD-10-CM

## 2020-03-12 ENCOUNTER — PATIENT MESSAGE (OUTPATIENT)
Dept: INTERNAL MEDICINE | Facility: CLINIC | Age: 60
End: 2020-03-12

## 2020-03-12 DIAGNOSIS — E10.9 DIABETES MELLITUS TYPE 1, CONTROLLED, INSULIN DEPENDENT (HCC): Primary | ICD-10-CM

## 2020-03-12 NOTE — TELEPHONE ENCOUNTER
Sil Melendez MA 3/12/2020 11:33 AM EDT        ----- Message -----  From: Renée Laurent  Sent: 3/12/2020 10:50 AM EDT  To: [facility] [address] Clinical Pool  Subject: Prescription Question     Dr. Torres,    Thanks for ordering the lab work. I’ll be in the office tomorrow or Monday to get it done.     Since I haven’t been able to see the endocrinologist yet (every time I had an appointment I was physically unable to go), would you be able to write a script for a refill of my Admelog so I can get it refilled? Palliative care is helping me - baby steps - but I’m able to get out and about more, but my endo appointment will not be soon enough for her to write the script. Thx so much.     Renée GOMEZ

## 2020-03-13 ENCOUNTER — TELEPHONE (OUTPATIENT)
Dept: PALLIATIVE CARE | Facility: CLINIC | Age: 60
End: 2020-03-13

## 2020-03-13 NOTE — TELEPHONE ENCOUNTER
Left message on patient's phone (identified on message as Renée) to call us at [phone] to discuss her care to decrease in person visits in response to public health measures recommended by CDC due to COVID-19 pandemic, as she is scheduled for follow up 3/24/20 for her chronic pain.  She was started on C3 Butrans patch last month, with screening UDT and PDMP reports without concerns, low score ORT-OUD (although her son has OUD), so overall low risk of abuse/diversion.

## 2020-03-18 ENCOUNTER — TELEPHONE (OUTPATIENT)
Dept: PALLIATIVE CARE | Facility: CLINIC | Age: 60
End: 2020-03-18

## 2020-03-18 NOTE — TELEPHONE ENCOUNTER
Sil Melendez MA 3/18/2020 11:58 AM EDT        ----- Message -----  From: Renée Laurent  Sent: 3/17/2020 8:40 PM EDT  To: [facility] [address] Clinical Pool  Subject: RE:lab     Dr. Torres,    With all the issues surrounding the coronavirus (and my having health issues that put me at greater risk), I haven’t gone in yet to have my labs done. Is it okay do you think to come in  or  to have it done or should I wait a few weeks until things are under better control? Not sure if you’re limiting your doctor office visits.    Thx    Renée Laurent  ----- Message -----  From: Karla Torres MD  Sent: 3/6/2020 7:36 PM EST  To: Renée Laurent  Subject: lab  Ms Laurent  I have been researching other causes of hair loss. Low vitamin D and low iron were also listed, I have added those labs to the pending thyroid lab. You do not need to fast for the lab.  Dr Torres

## 2020-03-18 NOTE — TELEPHONE ENCOUNTER
Pt called requesting refills as she will run out on 22nd before her next apt (on 24th).  As discussed with MD previous, asked how her pain has done on the patch and she stated about 15% improvement. Would like to go up if safe to do so.  Pt does state the zyprexa has helped and wants to keep as is.  Pt expressed her thankfulness for the clinic and prescribing saying she had about 8 days in row where she got out of bed and didn't have to go back, which is a big step forward to her.     Instructed refills would be sent in today to be picked up on 22nd. Call pharmacy day of and remind them script is there and was sent in last week because they have look in a different place. Pt danis/ev.

## 2020-03-19 DIAGNOSIS — E10.43 DIABETIC GASTROPARESIS ASSOCIATED WITH TYPE 1 DIABETES MELLITUS (HCC): ICD-10-CM

## 2020-03-19 DIAGNOSIS — R10.13 EPIGASTRIC PAIN: Primary | ICD-10-CM

## 2020-03-19 DIAGNOSIS — R63.0 DECREASE IN APPETITE: ICD-10-CM

## 2020-03-19 DIAGNOSIS — R10.9 CHRONIC ABDOMINAL PAIN: ICD-10-CM

## 2020-03-19 DIAGNOSIS — G89.29 CHRONIC ABDOMINAL PAIN: ICD-10-CM

## 2020-03-19 DIAGNOSIS — K31.84 DIABETIC GASTROPARESIS ASSOCIATED WITH TYPE 1 DIABETES MELLITUS (HCC): ICD-10-CM

## 2020-03-19 RX ORDER — OLANZAPINE 2.5 MG/1
2.5 TABLET ORAL NIGHTLY
Qty: 90 TABLET | Refills: 3 | Status: SHIPPED | OUTPATIENT
Start: 2020-03-19 | End: 2020-09-15 | Stop reason: SDUPTHER

## 2020-03-19 NOTE — TELEPHONE ENCOUNTER
This provider note, based on phone call or other Telemedicine alternative, was created to either supplement or replace provision of in-person palliative care services by a provider (physician or nurse practitioner).  These services were not provided face-to-face due to the current recommendations of the CDC, restrictions implemented by the healthcare clinic or hospital which houses the clinic, or by request of the patient/family during the 2020 COVID-19 pandemic.    Pt was scheduled for routine f/u on 3/24/20.    As of 3/19/20, [facility] has not yet set up audio-visual telemedicine visits for this provider, and is rolling it out to primary care and urgent care providers in the next few weeks.  Request has already been submitted.    I called patient 3/19/20 for encounter (prompted after request for refills, needed prior to appt date).  Was unable to reach her prior to end of business day.  Was able to reach her 3/20/20.      Please see note from that day.

## 2020-03-20 ENCOUNTER — TELEPHONE (OUTPATIENT)
Dept: PALLIATIVE CARE | Facility: CLINIC | Age: 60
End: 2020-03-20

## 2020-03-20 RX ORDER — BUPRENORPHINE 7.5 UG/H
1 PATCH TRANSDERMAL
Qty: 8 PATCH | Refills: 0 | Status: SHIPPED | OUTPATIENT
Start: 2020-03-20 | End: 2020-05-12

## 2020-03-20 NOTE — TELEPHONE ENCOUNTER
This provider note, based on phone call or other Telemedicine alternative, was created to either supplement or replace provision of in-person palliative care services by a provider (physician or nurse practitioner).  These services were not provided face-to-face due to the current recommendations of the CDC, restrictions implemented by the healthcare clinic or hospital which houses the clinic, or by request of the patient/family during the 2020 COVID-19 pandemic.    Pt was scheduled for routine f/u on 3/24/20.    As of 3/20/20, [facility] has not yet set up audio-visual telemedicine visits for this provider, and is rolling it out to primary care and urgent care providers in the next few weeks.  Request has already been submitted.    I used telephone contact paired with Zoom meeting with patient for telehealth encounter today.    I called patient 3/19/20 for encounter (prompted after request for refills, needed prior to appt date).  Was unable to reach her prior to end of business day.  Was able to reach her 3/20/20.      Background:  PCP: Karla Torres MD  Referring Provider: Karla Torres MD  Reason for Referral: Depression, fatigue, gastroparesis    Renée is a 59yoF with h/o T1DM, gastroparesis s/p permanent gastric pacemaker placement on 12/17/2018. Also with h/o anxiety, depression.  She has had chronic abdominal pain since she was a teenager, with acute worsening in 2005 associated with significant weight loss. She was diagnosed with pancreatitis and in fact was found to have pancreatic divisum.  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  Her most troublesome symptom now is gastroparesis. She failed metoclopramide therapy due to development of Parkinsonian symptoms. She underwent placement of gastic pacemaker placement in 12/17/2018 though unfortunately has continued to have some gastroparesis. Pain: complains of pain due to chronic pancreatitis, nerve pain  "due to nerve damage.  Epigastric bloating pain and dull ache.  Impairs sleep - sleeps only 30 min at a time then up again due to pain.  Limits her activity outside of home. As of 2020, does not even leave home weekly.  Many missed medical appts due to intractable nausea +/- emesis and pain.  Has been on opioid therapy, Gabapentin, Pristiq, has undergone neurolytic interventions for abdominal pain.  Has concurrent chronic R arm radicular pain and cervical neck pain s/p diskectomy/fusion/rhizotomy. However, the debilitating pain is of the abdomen with nausea.  See 2/20/20 initial consultation note for detailed history.    Interim history (chart review):  Started on Butrans patch for chronic daily continuous abdominal pain after consultation and at that visit, we had discussed potential Telemedicine Behavioral Health supports for pain psychology (none available through [facility] unless she had F2F initial evaluation in [place], none yet available through [facility]).    Noted that PCP is managing patient's issues re: thyroid and vitamins.    Interim history (per patient):  She started Butrans patch first, then Olanzapine 8 days later.  Reports \"15% better\" from mix of both medications.      Able to continue morning activities (getting dressed, fixing meal, light housekeeping) without having to lie down afterwards to get pressure off the abdomen nor due to nausea.  Increase in activity, has resumed walking outside for 30 minutes for 5 days a week.  Prior to medications, she would do this once a week.  Increase in energy as does not feel dragged down from pain.  Decreased kratom use from q6h to once daily at 4pm, when pain gets worse.    No increase in chronic constipation.  No sedation, sleeping well at night and waking up easily and getting out of bed daily now.  Although, when she first started Olanzapine was \"knocked out cold.\"  This resolved after a few days.      Goal to continue current level of " "function, avoid \"problems getting off meds\", pain managed such that she no longer takes kratom at all.  \"Would like to be 50% better for better quality of life.\"    Re: Pristiq, was to be scheduled F2F visit with Behavioral Health APRN next week, but now COVID-19 pandemic and would want to avoid coming to clinic.  No telehealth set up yet.  Discussed the Supportive Services pharmacist recommendation to switch to Prozac then taper in 2 week increments.  She prefers to leave things for the moment and get mentally prepared for switch later.  Ideally, she would benefit from telemedicine counseling for underlying depression/anxiety if she becomes symptomatic from that.      Exam:  General:  Awake, alert, noted to be upright, conversant, NAD.    Risk assessment:  YADI #:  [id number].  No concerns.  Only Dronabinol from PCP and Butrans 5mcg patch picked up 2/22/20.      All UDTs without concerns (12/20/2016, 12/13/2018, /5/2019 without any illicit nor controlled meds.  2/20/20 UDT + THC from Dronabinol).    ORT-OUD:  2 (low risk )  Diversion risk:  Low, but noted her son is being treated for OUD and reportedly stable and doing well, he does not have regular contact with her.    Assessment:  1.  Chronic abdominal visceral centralized pain  2.  Depression    Plan:  1. Refill: Olanzapine 2.5mg nightly refilled for 90 days and 3 refills.   2. Increase dose:  Increase Butrans from 5mcg to 7.5mcg patch weekly for next 1- 2 months.  Discussed plan to not increase above 10mcg overall unless new pathology causing pain.  Goal is to stop kratom use.  3. Reschedule appointment:  1-2 months, telemedicine visit ideally until physical distancing recommendations are lifted.  Pt is on low risk regimen, screening and patient response without flags for medication abuse/diversion.  She is very engaged with her healthcare team.  4. Pt advised to call our clinical line [phone] for any symptom or side effect concerns, new clinical needs, " or for refill needs per usual clinic policies.  Encouraged use of MyChart messaging as well.    Call/Zoom time:  22 minutes

## 2020-03-23 RX ORDER — DRONABINOL 5 MG/1
CAPSULE ORAL
Qty: 60 CAPSULE | Refills: 0 | Status: SHIPPED | OUTPATIENT
Start: 2020-03-23 | End: 2020-05-04 | Stop reason: SDUPTHER

## 2020-04-20 ENCOUNTER — APPOINTMENT (OUTPATIENT)
Dept: BONE DENSITY | Facility: HOSPITAL | Age: 60
End: 2020-04-20

## 2020-04-22 DIAGNOSIS — F32.A DEPRESSION, UNSPECIFIED DEPRESSION TYPE: ICD-10-CM

## 2020-04-23 RX ORDER — DESVENLAFAXINE 100 MG/1
TABLET, EXTENDED RELEASE ORAL
Qty: 90 TABLET | Refills: 1 | Status: SHIPPED | OUTPATIENT
Start: 2020-04-23 | End: 2020-07-06 | Stop reason: DRUGHIGH

## 2020-04-24 DIAGNOSIS — K31.84 DIABETIC GASTROPARESIS ASSOCIATED WITH TYPE 1 DIABETES MELLITUS (HCC): ICD-10-CM

## 2020-04-24 DIAGNOSIS — R63.0 DECREASE IN APPETITE: ICD-10-CM

## 2020-04-24 DIAGNOSIS — E10.43 DIABETIC GASTROPARESIS ASSOCIATED WITH TYPE 1 DIABETES MELLITUS (HCC): ICD-10-CM

## 2020-04-27 RX ORDER — DRONABINOL 5 MG/1
CAPSULE ORAL
Qty: 60 CAPSULE | Refills: 0 | OUTPATIENT
Start: 2020-04-27

## 2020-05-04 ENCOUNTER — TELEPHONE (OUTPATIENT)
Dept: INTERNAL MEDICINE | Facility: CLINIC | Age: 60
End: 2020-05-04

## 2020-05-04 ENCOUNTER — TELEMEDICINE (OUTPATIENT)
Dept: INTERNAL MEDICINE | Facility: CLINIC | Age: 60
End: 2020-05-04

## 2020-05-04 DIAGNOSIS — R63.0 DECREASE IN APPETITE: ICD-10-CM

## 2020-05-04 DIAGNOSIS — E13.9 POST-PANCREATECTOMY DIABETES (HCC): Primary | ICD-10-CM

## 2020-05-04 DIAGNOSIS — Z90.410 POST-PANCREATECTOMY DIABETES (HCC): Primary | ICD-10-CM

## 2020-05-04 DIAGNOSIS — Z20.822 EXPOSURE TO COVID-19 VIRUS: ICD-10-CM

## 2020-05-04 DIAGNOSIS — E10.43 DIABETIC GASTROPARESIS ASSOCIATED WITH TYPE 1 DIABETES MELLITUS (HCC): ICD-10-CM

## 2020-05-04 DIAGNOSIS — E89.1 POST-PANCREATECTOMY DIABETES (HCC): Primary | ICD-10-CM

## 2020-05-04 DIAGNOSIS — E10.9 DIABETES MELLITUS TYPE 1, CONTROLLED, INSULIN DEPENDENT (HCC): ICD-10-CM

## 2020-05-04 DIAGNOSIS — K31.84 DIABETIC GASTROPARESIS ASSOCIATED WITH TYPE 1 DIABETES MELLITUS (HCC): ICD-10-CM

## 2020-05-04 DIAGNOSIS — K31.84 GASTROPARESIS: ICD-10-CM

## 2020-05-04 PROCEDURE — 99214 OFFICE O/P EST MOD 30 MIN: CPT | Performed by: FAMILY MEDICINE

## 2020-05-04 RX ORDER — ONDANSETRON 4 MG/1
4 TABLET, FILM COATED ORAL EVERY 8 HOURS PRN
Qty: 60 TABLET | Refills: 0 | Status: SHIPPED | OUTPATIENT
Start: 2020-05-04 | End: 2020-05-06

## 2020-05-04 RX ORDER — DRONABINOL 5 MG/1
5 CAPSULE ORAL
Qty: 60 CAPSULE | Refills: 0 | Status: SHIPPED | OUTPATIENT
Start: 2020-05-04 | End: 2020-05-06

## 2020-05-04 NOTE — TELEPHONE ENCOUNTER
checked with pt, she has about 15 boluses per day of 2 units plus the 0.45 units per hr.  Sent new scrip max 45 units per day

## 2020-05-04 NOTE — PROGRESS NOTES
Subjective   Renée Laurent is a 59 y.o. female.     Chief Complaint   Patient presents with   • Nausea   • Eating Disorder     gastroparesis   • Diabetes     You have chosen to receive care through a telehealth visit.  Do you consent to use a video/audio connection for your medical care today? Yes    This was an audio and video enabled telemedicine encounter.    Visit Vitals  LMP  (LMP Unknown)         History of Present Illness   Pt is staying home. Pt states that she was exposed to Covid 19 and stayed home about 3.5 weeks ago. Pt had chills and shortness of breath that lasted a few weeks.  Patient has felt well for the past 6 days.    Pt is taking insulin for DM. Pt states that the insulin is not lasting a full month.   Pt's sugars have been up over 300. Pt is watching her diet. Pt has to used more bolus injections for control. Pt states that her sugar will crash if she gives more that 2 unit bolus at a time. Pt sugar will drop to 50 if she takes 2.5 units of insulin bolus.    Pt is taking suboxone and zyprexa  from Dr Forrester. That is when the blood sugar started to increase. Pt is in palliative care.     Patient has gastroparesis.  Patient has lab pending for hair loss.  She is been to try and get that tomorrow when she goes to see her dentist.  The following portions of the patient's history were reviewed and updated as appropriate: allergies, current medications, past family history, past medical history, past social history, past surgical history and problem list.    Past Medical History:   Diagnosis Date   • Abdominal pain    • Absence of pancreas, acquired    • Allergic    • Anemia    • Anxiety    • Asthma    • Bile reflux gastritis    • Chronic illness    • Contact dermatitis     due to plants   • Depression    • Diabetes mellitus (CMS/HCC)    • Diabetes mellitus type I (CMS/HCC) 2011    Type 3c - surgically induced   • Eating disorder     Resolved   • Encounter for dental examination 07/2014   •  Gastroparesis    • GERD (gastroesophageal reflux disease)    • History of medical problems 12/1/2011    Gastroparesis   • Hyperlipidemia    • Hypoglycemia 2011   • Incisional hernia    • Insomnia    • Iron deficiency    • Kidney stone     Left kidney   • Myeloid leukemia (CMS/HCC)    • Osteopenia    • Osteoporosis 2015    Osteopenia   • Pancreatitis    • Pneumonia 1/1/2017   • Post-splenectomy    • Vitamin D deficiency       Past Surgical History:   Procedure Laterality Date   • ABDOMINAL SURGERY     • CERVICAL DISCECTOMY ANTERIOR     • CHOLECYSTECTOMY     • COLONOSCOPY     • COSMETIC SURGERY      Rhinoplasty   • GALLBLADDER SURGERY     • GASTRIC STIMULATOR IMPLANT SURGERY  12/17/2018    [facility]   • HERNIA MESH REMOVAL  2014    abdominal hernia with mesh    • OTHER SURGICAL HISTORY      duodeum removed   • OTHER SURGICAL HISTORY      jejunem removed   • PANCREAS SURGERY      pancreas removal   • SMALL INTESTINE SURGERY  12/1/2011    Tp-ait   • STOMACH SURGERY      distal stomach    • TOTAL ABDOMINAL HYSTERECTOMY WITH SALPINGO OOPHORECTOMY     • TUBAL ABDOMINAL LIGATION     • UPPER GASTROINTESTINAL ENDOSCOPY  10/25/2017    Dr Mehta, gastritis, eosinphils in esophagus      Family History   Problem Relation Age of Onset   • Osteoporosis Mother    • Cancer Mother         CLL   • Hashimoto's thyroiditis Mother    • Arthritis Mother    • Depression Mother    • Thyroid disease Mother         Hashimoto’s   • Lung cancer Father    • Alcohol abuse Father    • Cancer Father         Lung   • Liver disease Father    • Asthma Sister       Social History     Socioeconomic History   • Marital status: Single     Spouse name: Not on file   • Number of children: Not on file   • Years of education: Not on file   • Highest education level: Not on file   Tobacco Use   • Smoking status: Never Smoker   • Smokeless tobacco: Never Used   Substance and Sexual Activity   • Alcohol use: No   • Drug use: No   • Sexual activity: Not  Currently     Partners: Male     Birth control/protection: Post-menopausal      Allergies   Allergen Reactions   • Aspirin Hives   • Phenergan [Promethazine Hcl]      Anxiety/relessness   • Reglan [Metoclopramide]      Involuntary movement       Review of Systems   Constitutional: Positive for chills, diaphoresis, fatigue and fever.        Asymptomatic for the past 6 days.    HENT: Negative.  Negative for ear pain, nosebleeds, postnasal drip, rhinorrhea, sinus pressure, sneezing and sore throat.    Eyes: Negative.  Negative for redness and itching.   Respiratory: Positive for cough (improved) and shortness of breath (resolved). Negative for wheezing.    Cardiovascular: Negative.  Negative for chest pain, palpitations and leg swelling.   Gastrointestinal: Positive for abdominal pain, constipation and nausea. Negative for diarrhea and vomiting.        Pt has gastroparesis   Endocrine: Negative.  Negative for cold intolerance and heat intolerance.   Genitourinary: Negative.  Negative for dysuria, frequency, hematuria and urgency.   Musculoskeletal: Negative.  Negative for arthralgias, back pain and neck pain.   Skin: Negative.  Negative for color change and rash.   Allergic/Immunologic: Positive for environmental allergies.   Neurological: Negative.  Negative for dizziness, syncope, light-headedness and headaches.   Hematological: Negative.  Negative for adenopathy. Does not bruise/bleed easily.   Psychiatric/Behavioral: Positive for sleep disturbance (insomnia). Negative for dysphoric mood. The patient is not nervous/anxious.        Objective   Physical Exam   Constitutional: She appears well-developed and well-nourished. No distress.   HENT:   Head: Normocephalic.   Eyes: EOM are normal.   Neck: Normal range of motion.   Pulmonary/Chest: No respiratory distress.   Skin: She is not diaphoretic.   Psychiatric: She has a normal mood and affect. Her behavior is normal. Judgment and thought content normal.        Assessment/Plan   Renée was seen today for nausea, eating disorder and diabetes.    Diagnoses and all orders for this visit:    Post-pancreatectomy diabetes (CMS/HCC)    Gastroparesis  -     ondansetron (ZOFRAN) 4 MG tablet; Take 1 tablet by mouth Every 8 (Eight) Hours As Needed for Nausea or Vomiting.    Decrease in appetite  -     dronabinol (MARINOL) 5 MG capsule; Take 1 capsule by mouth 2 (Two) Times a Day Before Meals.    Diabetic gastroparesis associated with type 1 diabetes mellitus (CMS/HCC)  -     dronabinol (MARINOL) 5 MG capsule; Take 1 capsule by mouth 2 (Two) Times a Day Before Meals.    Diabetes mellitus type 1, controlled, insulin dependent (CMS/HCC)  -     insulin lispro (Admelog) 100 UNIT/ML injection; Use in pump 0.45 units per hour basal dose with 2 unit per 30 g of carb bolus,with meals or snacks. E10.9    Exposure to Covid-19 Virus      Mannie report reviewed and is consistent #[id number]    Increase insulin for better control of the blood sugars, new scrip written.   Pt is planning to get lab tomorrow.   Refill Marinol and zofran         Current Outpatient Medications:   •  ALPHA LIPOIC ACID PO, Take 400 mg by mouth Daily., Disp: , Rfl:   •  B Complex Vitamins (VITAMIN B COMPLEX PO), Take 1 tablet by mouth Daily., Disp: , Rfl:   •  Biotin 22597 MCG tablet, Take 1 tablet by mouth Daily., Disp: , Rfl:   •  Buprenorphine (Butrans) patch weekly transdermal patch, Place 1 patch on the skin as directed by provider Every 7 (Seven) Days for 56 days., Disp: 8 patch, Rfl: 0  •  busPIRone (BUSPAR) 5 MG tablet, Take 1 tablet by mouth 2 (Two) Times a Day., Disp: 60 tablet, Rfl: 5  •  coenzyme Q10 50 MG capsule capsule, Take 50 mg by mouth Daily., Disp: , Rfl:   •  desvenlafaxine (PRISTIQ) 100 MG 24 hr tablet, TAKE ONE TABLET BY MOUTH DAILY, Disp: 90 tablet, Rfl: 1  •  dronabinol (MARINOL) 5 MG capsule, Take 1 capsule by mouth 2 (Two) Times a Day Before Meals., Disp: 60 capsule, Rfl: 0  •  glucagon  (GLUCAGON EMERGENCY) 1 MG injection, Inject 1 mg into the appropriate muscle as directed by prescriber 1 (One) Time As Needed for Low Blood Sugar for up to 1 dose., Disp: 1 kit, Rfl: 11  •  glucose blood test strip, Used to test blood sugar 4 times daily for diabetes. E10.9, Disp: 150 each, Rfl: 12  •  insulin lispro (Admelog) 100 UNIT/ML injection, Use in pump 0.45 units per hour basal dose with 2 unit per 30 g of carb bolus,with meals or snacks. E10.9, Disp: 20 mL, Rfl: 3  •  MAGNESIUM GLYCINATE PLUS PO, Take 1 tablet by mouth Daily., Disp: , Rfl:   •  Medium Chain Triglycerides (MCT OIL PO), Take 15 mL by mouth Daily., Disp: , Rfl:   •  Multiple Vitamins-Minerals (AQUADEKS PO), Take  by mouth., Disp: , Rfl:   •  OLANZapine (zyPREXA) 2.5 MG tablet, Take 1 tablet by mouth Every Night for 360 days., Disp: 90 tablet, Rfl: 3  •  ondansetron (ZOFRAN) 4 MG tablet, Take 1 tablet by mouth Every 8 (Eight) Hours As Needed for Nausea or Vomiting., Disp: 60 tablet, Rfl: 0  •  pancrelipase, Lip-Prot-Amyl, (CREON) 68516-77356 units capsule delayed-release particles capsule, Take 4-5 tablets tid with meals and 2-3 with snack, Disp: 720 capsule, Rfl: 4  •  pantoprazole (PROTONIX) 40 MG EC tablet, TAKE ONE TABLET BY MOUTH EVERY MORNING ON AN EMPTY STOMACH.  WAY AT LEAST 30 MINUTES TO ONE HOUR BEFORE EATING, Disp: 90 tablet, Rfl: 1  •  Probiotic Product (PROBIOTIC DAILY PO), Take  by mouth., Disp: , Rfl:   •  riFAXIMin (XIFAXAN) 550 MG tablet, Take 550 mg by mouth Every 12 (Twelve) Hours. Bid for 10 days as needed, Disp: , Rfl:   •  Unable to find, Take 500 mg by mouth Daily. Bacopa, Disp: , Rfl:   •  Unable to find, Take 1 each by mouth Daily. Med Name:Ashwaghanda, Disp: , Rfl:   •  Unable to find, 1 each 1 (One) Time. Med Name: Triphala, Disp: , Rfl:   •  Unable to find, Take 1 each by mouth Daily. Med Name: Calcium, Disp: , Rfl:   •  vitamin C (ASCORBIC ACID) 500 MG tablet, Take 500 mg by mouth Daily., Disp: , Rfl:   •  vitamin  D (ERGOCALCIFEROL) 57996 UNITS capsule capsule, TAKE ONE CAPSULE BY MOUTH EVERY 7 DAYS, Disp: 4 capsule, Rfl: 0    Return in about 3 months (around 8/4/2020), or if symptoms worsen or fail to improve, for Recheck.     This visit has been rescheduled as a video visit to comply with patient safety concerns in accordance with CDC recommendations. Total time of discussion was 18 minutes.  l

## 2020-05-04 NOTE — TELEPHONE ENCOUNTER
DEDRA FROM [facility] CALLED REGARDING THE FOLLOWING PRESCRIPTION THAT WAS SENT IN. DEDRA IS REQUESTING A CALL BACK.       insulin lispro (Admelog) 100 UNIT/ML injection    DEDRA STATES THERE IS NOT A MAX DOSE ON RX.     CONTACT:  KAMRAN CHANG [facility]  [place], [place] - [address] AT [facility] [employer] [address] - [phone]  - [phone]

## 2020-05-06 DIAGNOSIS — K31.84 DIABETIC GASTROPARESIS ASSOCIATED WITH TYPE 1 DIABETES MELLITUS (HCC): ICD-10-CM

## 2020-05-06 DIAGNOSIS — R63.0 DECREASE IN APPETITE: ICD-10-CM

## 2020-05-06 DIAGNOSIS — E10.43 DIABETIC GASTROPARESIS ASSOCIATED WITH TYPE 1 DIABETES MELLITUS (HCC): ICD-10-CM

## 2020-05-06 DIAGNOSIS — K31.84 GASTROPARESIS: ICD-10-CM

## 2020-05-06 RX ORDER — ONDANSETRON 4 MG/1
TABLET, FILM COATED ORAL
Qty: 60 TABLET | Refills: 0 | Status: SHIPPED | OUTPATIENT
Start: 2020-05-06 | End: 2020-08-23

## 2020-05-06 RX ORDER — DRONABINOL 5 MG/1
CAPSULE ORAL
Qty: 60 CAPSULE | Refills: 0 | Status: SHIPPED | OUTPATIENT
Start: 2020-05-06 | End: 2020-06-01

## 2020-05-12 DIAGNOSIS — R10.9 CHRONIC ABDOMINAL PAIN: ICD-10-CM

## 2020-05-12 DIAGNOSIS — G89.29 CHRONIC ABDOMINAL PAIN: ICD-10-CM

## 2020-05-12 RX ORDER — BUPRENORPHINE 7.5 UG/H
PATCH TRANSDERMAL
Qty: 8 PATCH | Refills: 0 | Status: SHIPPED | OUTPATIENT
Start: 2020-05-12 | End: 2020-05-28

## 2020-05-17 DIAGNOSIS — G89.29 CHRONIC ABDOMINAL PAIN: ICD-10-CM

## 2020-05-17 DIAGNOSIS — R10.9 CHRONIC ABDOMINAL PAIN: ICD-10-CM

## 2020-05-18 RX ORDER — BUPRENORPHINE 7.5 UG/H
PATCH TRANSDERMAL
Qty: 8 PATCH | Refills: 0 | OUTPATIENT
Start: 2020-05-18

## 2020-05-18 NOTE — TELEPHONE ENCOUNTER
Called pt to let know it was refused because prescription was already at pharmacy. Instructed to call back if further problems/needs with refill.

## 2020-05-18 NOTE — TELEPHONE ENCOUNTER
Duplicate request for Butrans patch refill.  This was already prescribed for 8 week supply on 5/12/20.  Should be ready for patient pickup at pharmacy

## 2020-05-19 ENCOUNTER — TELEPHONE (OUTPATIENT)
Dept: PALLIATIVE CARE | Facility: CLINIC | Age: 60
End: 2020-05-19

## 2020-05-19 NOTE — TELEPHONE ENCOUNTER
Received PA request for Butrans patch.  Completed PA. Called pharmacy to let them know it was approved. Notified me that it does goes through however pt cost is still $159. Pharmacy states that pt has ok'd that and they would fill medication.

## 2020-05-28 ENCOUNTER — TELEMEDICINE (OUTPATIENT)
Dept: PALLIATIVE CARE | Facility: CLINIC | Age: 60
End: 2020-05-28

## 2020-05-28 DIAGNOSIS — K31.84 GASTROPARESIS DUE TO DM (HCC): Primary | ICD-10-CM

## 2020-05-28 DIAGNOSIS — G89.29 CHRONIC ABDOMINAL PAIN: ICD-10-CM

## 2020-05-28 DIAGNOSIS — E11.43 GASTROPARESIS DUE TO DM (HCC): Primary | ICD-10-CM

## 2020-05-28 DIAGNOSIS — R10.9 CHRONIC ABDOMINAL PAIN: ICD-10-CM

## 2020-05-28 PROCEDURE — 99443 PR PHYS/QHP TELEPHONE EVALUATION 21-30 MIN: CPT | Performed by: INTERNAL MEDICINE

## 2020-05-28 RX ORDER — DEXLANSOPRAZOLE 60 MG/1
60 CAPSULE, DELAYED RELEASE ORAL DAILY
Qty: 90 CAPSULE | Refills: 0 | Status: SHIPPED | OUTPATIENT
Start: 2020-05-28 | End: 2020-06-30

## 2020-05-28 RX ORDER — BUPRENORPHINE 10 UG/H
1 PATCH TRANSDERMAL WEEKLY
Qty: 4 PATCH | Refills: 0 | Status: SHIPPED | OUTPATIENT
Start: 2020-05-28 | End: 2020-06-25 | Stop reason: SINTOL

## 2020-05-28 NOTE — PROGRESS NOTES
Pt registered on [employer] however not showing on our end. Worked through troubleshooting options with pt and could not resolve so performing telephone visit today.     You have chosen to receive care through a telephone visit. Do you consent to use a telephone visit for your medical care today? Yes    Pt being seen for symptom management related to chronic diseas process of gastroparesis. Pt's main complaints are of pain, nausea, and decreased appetite from the nausea.     Med Counts  Medication Filled # Filled Count Used  # days KASSIE Romero  5/26/20 8 7 1 2

## 2020-05-28 NOTE — PROGRESS NOTES
This provider note, based on phone call or other Telemedicine alternative, was created to either supplement or replace provision of in-person palliative care services by a provider (physician or nurse practitioner).  These services were not provided face-to-face due to the current recommendations of the CDC, restrictions implemented by the healthcare clinic or hospital which houses the clinic, or by request of the patient/family during the 2020 COVID-19 pandemic.    Patient verbally consented to this telehealth encounter.      Background:  PCP: Karla Torres MD  Referring Provider: Karla Torres MD  Reason for Referral: Depression, fatigue, gastroparesis     Renée is a 59yoF with h/o T1DM, gastroparesis s/p permanent gastric pacemaker placement on 12/17/2018. Also with h/o anxiety, depression.  She has had chronic abdominal pain since she was a teenager, with acute worsening in 2005 associated with significant weight loss. She was diagnosed with pancreatitis and in fact was found to have pancreatic divisum.  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  Her most troublesome symptom now is gastroparesis. She failed metoclopramide therapy due to development of Parkinsonian symptoms. She underwent placement of gastic pacemaker placement in 12/17/2018 though unfortunately has continued to have some gastroparesis. Pain: complains of pain due to chronic pancreatitis, nerve pain due to nerve damage.  Epigastric bloating pain and dull ache.  Impairs sleep - sleeps only 30 min at a time then up again due to pain.  Limits her activity outside of home. As of 2020, does not even leave home weekly.  Many missed medical appts due to intractable nausea +/- emesis and pain.  Has been on opioid therapy, Gabapentin, Pristiq, has undergone neurolytic interventions for abdominal pain.  Has concurrent chronic R arm radicular pain and cervical neck pain s/p  "diskectomy/fusion/rhizotomy. However, the debilitating pain is of the abdomen with nausea.  See 2/20/20 initial consultation note for detailed history.     Noted that PCP is managing patient's issues re: thyroid and vitamins.    Interim history (chart review):  PCP f/u 5/4/20 - noted she was exposed to COVID-19 with symptoms of SOA for few weeks.    Interim history (per patient):  2 month f/u.  On Butrans 7.5mcg dose.  Pain after she eats has \"ticked up\" again.  Awakening in middle of night due to pain.  BS running higher.  Has also noted she has to sleep sitting up, as she has awakened with bile taste in back of throat.     ANALGESIA:  Better, but worsening  ADVERSE EFFECTS:  No constipation, no sedation  ACTIVITY:  Independent of ADLs.  Self quarantine and also home limited 2/2 chronic pain and nausea with frequent bad days  AFFECT:  Mild increase anxiety after losing job  ABERRANT BEHAVIORS:  none    Visited with her daughter this past week, which has helped with situational depression.    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  Financially feeling \"precarious\" but \"I will be ok.\"      ECOG: (2) Ambulatory and capable of self care, unable to carry out work activity, up and about > 50% or waking hours    Physical Exam:  General:  Alert, conversant, NAD  PULM;  No breathlessness, no cough, no pausing to catch breath during conversation  Neuro:  Alert, no obvious cognitive deficit.    Psych:  Normal intonation.  Speech noted to be deliberate with intentional pauses to state thoughts very clearly    Risk assessment:  YADI:  Noted on Dronabinol and Butrans    Medication count (per patient/caregiver):  Just picked up refills of Butrans (8 patches for 2 months).  Wearing one and has 7 unopened of 7.5mcg dose    UDS:  THC, Screen, Urine   Date Value Ref Range Status   02/20/2020 Positive (A) Negative Final     Phencyclidine (PCP), Urine   Date Value Ref Range Status   02/20/2020 Negative Negative Final "     Cocaine Screen, Urine   Date Value Ref Range Status   02/20/2020 Negative Negative Final     Methamphetamine, Ur   Date Value Ref Range Status   02/20/2020 Negative Negative Final     Opiate Screen   Date Value Ref Range Status   02/20/2020 Negative Negative Final     Amphetamine Screen, Urine   Date Value Ref Range Status   02/20/2020 Negative Negative Final     Benzodiazepine Screen, Urine   Date Value Ref Range Status   02/20/2020 Negative Negative Final     Tricyclic Antidepressants Screen   Date Value Ref Range Status   02/20/2020 Negative Negative Final     Methadone Screen, Urine   Date Value Ref Range Status   02/20/2020 Negative Negative Final     Barbiturates Screen, Urine   Date Value Ref Range Status   02/20/2020 Negative Negative Final     Oxycodone Screen, Urine   Date Value Ref Range Status   02/20/2020 Negative Negative Final     Propoxyphene Screen   Date Value Ref Range Status   02/20/2020 Negative Negative Final     Buprenorphine, Screen, Urine   Date Value Ref Range Status   02/20/2020 Negative Negative Final     Palliative Performance Scale  Palliative Performance Scale Score: 70%    [place] Symptom Assessment System Revised  Pain Score: 5   ESAS Tiredness Score: 6  ESAS Nausea Score: 7  ESAS Depression Score: 1  ESAS Anxiety Score: 1  ESAS Drowsiness Score: 7  ESAS Lack of Appetite Score: 6  ESAS Wellbeing Score: 1  ESAS Dyspnea Score: 5  ESAS Source of Information: patient    Controlled medication tracking flowsheet reviewed.  See attached    Universal precautions:    ORT risk score (ORT-OUD and/or COMM):  Low score for her.  Noted she has son getting treatment for OUD  Aberrant behavior:  none  Indication:  Chronic abdominal pain, not responsive to dietary changes, gastric pacemaker  OME:  N/a buprenorphine   Naloxone prescribed:  no     Assessment/Plan   Renée was seen today for appointment and follow-up.    Diagnoses and all orders for this visit:    Gastroparesis due to DM  (CMS/[facility])  -     Ambulatory Referral to Gastroenterology    Chronic abdominal pain  -     Ambulatory Referral to Pain Management  -     Buprenorphine 10 MCG/HR patch weekly; Place 1 patch on the skin as directed by provider 1 (One) Time Per Week for 28 days.    Other orders  -     dexlansoprazole (DEXILANT) 60 MG capsule; Take 1 capsule by mouth Daily for 90 days.    Prior authorization documentation:  Inefficacious:  Fentanyl 100mcg, oxycodone  Intolerant to side effects: Gabapentin (dose related)           Plan:  1. Change Protonix to Dexilant (was changed from Nexium to Protonix with transient improvement)  2. Increase dose Butrans to 10mcg patch.  Discussed next dose would be 15mcg and she could use 2 of 7.5mg patches.  Alternatively, if pain and GERD improved, would go back down to 7.5mcg.  Discussed risk of worsening GI motility with any opioid increase, even partial agonist buprenorphine.    3. Refer to [facility] specialist, Dr. Santacruz, to establish local GI continuity of care.  Requires oversight of her pacemaker function as well.  4. Refer to Interventional Pain for consideration of celiac block to address her chronic abdominal pain.  5. She will schedule her Behavioral Health appt to approach chronic pain cognitively as well.  She prefers all Telemedicine visits.  6. She will continue to work with her PCP on improved glucose control, which is impacting her gastroparesis.  7. Pt advised to call our clinical line [phone] for any symptom or side effect concerns, new clinical needs, or for refill needs per usual clinic policies.    FOLLOW UP:  1 month    Unable to complete visit using a video connection to the patient. A phone visit was used to complete this visits. Total time of discussion was 26 minutes.

## 2020-06-01 DIAGNOSIS — R63.0 DECREASE IN APPETITE: ICD-10-CM

## 2020-06-01 DIAGNOSIS — K31.84 DIABETIC GASTROPARESIS ASSOCIATED WITH TYPE 1 DIABETES MELLITUS (HCC): ICD-10-CM

## 2020-06-01 DIAGNOSIS — E10.43 DIABETIC GASTROPARESIS ASSOCIATED WITH TYPE 1 DIABETES MELLITUS (HCC): ICD-10-CM

## 2020-06-01 RX ORDER — DRONABINOL 5 MG/1
CAPSULE ORAL
Qty: 60 CAPSULE | Refills: 0 | Status: SHIPPED | OUTPATIENT
Start: 2020-06-01 | End: 2020-08-04

## 2020-06-13 ENCOUNTER — PATIENT MESSAGE (OUTPATIENT)
Dept: PALLIATIVE CARE | Facility: CLINIC | Age: 60
End: 2020-06-13

## 2020-06-13 DIAGNOSIS — G89.29 CHRONIC ABDOMINAL PAIN: Primary | ICD-10-CM

## 2020-06-13 DIAGNOSIS — R10.9 CHRONIC ABDOMINAL PAIN: Primary | ICD-10-CM

## 2020-06-19 DIAGNOSIS — K21.9 GASTROESOPHAGEAL REFLUX DISEASE, ESOPHAGITIS PRESENCE NOT SPECIFIED: ICD-10-CM

## 2020-06-19 DIAGNOSIS — F41.9 ANXIETY: ICD-10-CM

## 2020-06-19 RX ORDER — PANTOPRAZOLE SODIUM 40 MG/1
TABLET, DELAYED RELEASE ORAL
Qty: 90 TABLET | Refills: 0 | OUTPATIENT
Start: 2020-06-19

## 2020-06-22 RX ORDER — BUSPIRONE HYDROCHLORIDE 5 MG/1
TABLET ORAL
Qty: 60 TABLET | Refills: 4 | Status: SHIPPED | OUTPATIENT
Start: 2020-06-22 | End: 2020-12-21

## 2020-06-24 ENCOUNTER — TELEPHONE (OUTPATIENT)
Dept: PALLIATIVE CARE | Facility: CLINIC | Age: 60
End: 2020-06-24

## 2020-06-24 NOTE — TELEPHONE ENCOUNTER
Called pt to discuss several topics as detailed below.     1.  Explained [facility] referral was sent but they do not have a doctor that manages Gastric Stimulators. Sent referral to [place].  Pt already established there to have stimulator adjusted but looking for a local GI doctor to manage rest of pt problems.  Pt stated she is open to having doctor at  or [facility]hovah's witness with Jehovah's witness provider allowing for easier care coordination.  Informed would address with MD with possible referral to Dr. Mehta.  Contacted Referral Coordinator and had her cancel [facility] referral.     2. Received [employer] message regarding Butrans patch.  Picture reviewed. Pt reviews it is not just a skin reaction issue but also they the patches are not adhering for the full work and that is even with reinforcement which in turn causes more skin irritation. Pt desiring to switch to oral form and understands may have delay to insurance authorization.      3.  Informed pt due to scheduling needs, we need to either move her apt to Tuesday 30th or if she felt she needed to be seen tomorrow could add her on to end of our day. Pt agreeable to move apt to Tues June 30th @ 3. Contacted  to adjust apts.     Pt has no further needs or questions. Instructed I would discuss these things with MD and  Begin med process and call her back Friday with update. Encouraged phone contact in the interim if any needs or questions develop.

## 2020-06-25 ENCOUNTER — TELEPHONE (OUTPATIENT)
Dept: INTERNAL MEDICINE | Facility: CLINIC | Age: 60
End: 2020-06-25

## 2020-06-25 DIAGNOSIS — G89.29 CHRONIC ABDOMINAL PAIN: Primary | ICD-10-CM

## 2020-06-25 DIAGNOSIS — R10.9 CHRONIC ABDOMINAL PAIN: Primary | ICD-10-CM

## 2020-06-25 NOTE — TELEPHONE ENCOUNTER
While reviewing next week schedule, noticed the patient had scheduled my chart visit with diagnosis of chest pain shortness of breath diaphoresis.  Left message patient should be going to ER rather than waiting for appointment.    Called patient's daughter Trena who is emergency contact.  Advised her to make sure her mother goes to ER with chest pain.  She agreed.

## 2020-06-26 NOTE — TELEPHONE ENCOUNTER
Spoke with pt and advised to proceed to the ER for a full work up. She states that she really doesn't feel bad and it's only been going on for about a week without easing up or going away, states it's a little for the past couple of days. Advised to please go to the ER per Dr. Torres, she did agree and will go today, advised that now would be better than later. Pt verbalized understanding.

## 2020-06-30 ENCOUNTER — TELEMEDICINE (OUTPATIENT)
Dept: PALLIATIVE CARE | Facility: CLINIC | Age: 60
End: 2020-06-30

## 2020-06-30 DIAGNOSIS — K91.2 POSTSURGICAL MALABSORPTION: ICD-10-CM

## 2020-06-30 DIAGNOSIS — G89.29 CHRONIC ABDOMINAL PAIN: Primary | ICD-10-CM

## 2020-06-30 DIAGNOSIS — K31.84 GASTROPARESIS DUE TO DM (HCC): ICD-10-CM

## 2020-06-30 DIAGNOSIS — R10.9 CHRONIC ABDOMINAL PAIN: Primary | ICD-10-CM

## 2020-06-30 DIAGNOSIS — E11.43 GASTROPARESIS DUE TO DM (HCC): ICD-10-CM

## 2020-06-30 PROCEDURE — 99443 PR PHYS/QHP TELEPHONE EVALUATION 21-30 MIN: CPT | Performed by: INTERNAL MEDICINE

## 2020-06-30 RX ORDER — PANTOPRAZOLE SODIUM 40 MG/1
40 TABLET, DELAYED RELEASE ORAL DAILY
COMMUNITY
End: 2020-12-09

## 2020-06-30 NOTE — PROGRESS NOTES
This provider note, based on phone call or other Telemedicine alternative, was created to either supplement or replace provision of in-person palliative care services by a provider (physician or nurse practitioner).  These services were not provided face-to-face due to the current recommendations of the CDC, restrictions implemented by the healthcare clinic or hospital which houses the clinic, or by request of the patient/family during the 2020 COVID-19 pandemic.    Patient verbally consented to this telehealth encounter.    Background:  PCP: Karla Torres MD  Referring Provider: Karla Torres MD  Reason for Referral: Depression, fatigue, gastroparesis     Renée is a 59yoF with h/o T1DM, gastroparesis s/p permanent gastric pacemaker placement on 12/17/2018. Also with h/o anxiety, depression.  She has had chronic abdominal pain since she was a teenager, with acute worsening in 2005 associated with significant weight loss. She was diagnosed with pancreatitis and in fact was found to have pancreatic divisum.  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  Her most troublesome symptom now is gastroparesis. She failed metoclopramide therapy due to development of Parkinsonian symptoms. She underwent placement of gastic pacemaker placement in 12/17/2018 though unfortunately has continued to have some gastroparesis. Pain: complains of pain due to chronic pancreatitis, nerve pain due to nerve damage.  Epigastric bloating pain and dull ache.  Impairs sleep - sleeps only 30 min at a time then up again due to pain.  Limits her activity outside of home. As of 2020, does not even leave home weekly.  Many missed medical appts due to intractable nausea +/- emesis and pain.  Has been on opioid therapy, Gabapentin, Pristiq, has undergone neurolytic interventions for abdominal pain.  Has concurrent chronic R arm radicular pain and cervical neck pain s/p  diskectomy/fusion/rhizotomy. However, the debilitating pain is of the abdomen with nausea.  See 2/20/20 initial consultation note for detailed history.     Noted that PCP is managing patient's issues re: thyroid and vitamins.     Interim history (chart review):  Care coordination messages:  [facility] will not manage pacemaker, so she will need to continue f/u with [facility] for pacemaker.  She was referred to [facility] for general GI f/u, has telemedicine visit scheduled 7/13/20.      Referred to Dr. Kumar for Interventional Pain options.  Consideration of IT pump as has not had very effective response to neurolytic blocs in past.  However, FDA delay with [employer] pumps.      Has appt Thursday with Dr. Maycol Govea.    Referred to [facility] Goldman for evaluation of antidepressant deprescribing and chronic depression, but pt No Show, rescheduled for tomorrow.    Pt with pruritic localized rash with Butrans, so she stopped this.  I e-scribed Belbuca, but insurance delay with PA, then partially approved but requested Tramadol ER as preferred.  I called this morning for peer to peer, but representative resubmitted PA again with addition of clarification that due to GI issues, enteral route not preferred to medication.    Noted that patient scheduled PCP evisit for chest pain yesterday, and was directed to go to ED.  No ED encounter in EMR.    Interim history (per patient):  Last Butrans was removed 5 days ago due to rash.  She tried topical Benadryl, but still had burning and itching.  Also issues with adhesive - would not stick for the entire week.  She has one 10mcg and one 7.5mcg patch of Butrans.  Could not afford Dexilant, so went back to Protonix.      She is using kratom now again.  More constipation, that she had before when using kratom.    Marinol still managing appetite and nausea (Dr. Torres refilling).      She shares that she has done research on end of life options with [employer] and [employer].  Reports she  "is exploring this as \"10 to 20 years from now.\"  Reflects on watching her mother decline, who  at age 93, and \"withering bit by bit\" and not wanting that for herself.  She has discussed this with her son and daughter.  They had many questions but are supportive.  Values independence and quality of life (symptom and chronic pain management).  States she feels her QOL is better than 2 months ago.  Hopeful for her appts with Dr. Govea and [facility] Goldman.      ROS:  Per HPI, otherwise negative.      ECOG: (2) Ambulatory and capable of self care, unable to carry out work activity, up and about > 50% or waking hours    Physical Exam:  General:  Alert, conversant  Neuro:  Normal speech.  No cognitive deficit apparent.  Pulm:  No breathlessness, no wheeze, no cough, no pausing to catch breath during conversation.  Psych:  Normal intonation with speech    Risk assessment:  YADI #:  [id number]    Medication count (per patient/caregiver):  #1 off 8 patches Butrans 7.5mcg filled 20.  #1 of 4 patches Butrans 10mcg filled 20.    UDS:  THC, Screen, Urine   Date Value Ref Range Status   2020 Positive (A) Negative Final     Phencyclidine (PCP), Urine   Date Value Ref Range Status   2020 Negative Negative Final     Cocaine Screen, Urine   Date Value Ref Range Status   2020 Negative Negative Final     Methamphetamine, Ur   Date Value Ref Range Status   2020 Negative Negative Final     Opiate Screen   Date Value Ref Range Status   2020 Negative Negative Final     Amphetamine Screen, Urine   Date Value Ref Range Status   2020 Negative Negative Final     Benzodiazepine Screen, Urine   Date Value Ref Range Status   2020 Negative Negative Final     Tricyclic Antidepressants Screen   Date Value Ref Range Status   2020 Negative Negative Final     Methadone Screen, Urine   Date Value Ref Range Status   2020 Negative Negative Final     Barbiturates Screen, Urine   Date Value Ref " Range Status   02/20/2020 Negative Negative Final     Oxycodone Screen, Urine   Date Value Ref Range Status   02/20/2020 Negative Negative Final     Propoxyphene Screen   Date Value Ref Range Status   02/20/2020 Negative Negative Final     Buprenorphine, Screen, Urine   Date Value Ref Range Status   02/20/2020 Negative Negative Final     Palliative Performance Scale  Palliative Performance Scale Score: 70%    [place] Symptom Assessment System Revised  Pain Score: 7   ESAS Tiredness Score: 8  ESAS Nausea Score: 8  ESAS Depression Score: 2  ESAS Anxiety Score: 2  ESAS Drowsiness Score: 8  ESAS Lack of Appetite Score: 6  ESAS Wellbeing Score: 2  ESAS Dyspnea Score: 4  ESAS Source of Information: patient    Controlled medication tracking flowsheet reviewed.  See attached    Universal precautions:    ORT risk score (ORT-OUD and/or COMM):  Low score for her.  Noted she has son getting treatment for OUD  Aberrant behavior:  none  Indication:  Chronic abdominal pain, not responsive to dietary changes, gastric pacemaker  OME:  N/a buprenorphine   Naloxone prescribed:  no     Assessment/Plan   Renée was seen today for appointment and follow-up.    Diagnoses and all orders for this visit:    Chronic abdominal pain    Gastroparesis due to DM (CMS/HCC)    Postsurgical malabsorption         Prior authorization documentation:  Inefficacious:  Fentanyl 100mcg, oxycodone, hydrocodone, tramadol  Intolerant to side effects: Gabapentin (dose related), Butrans (skin irritation)    Plan:  1. Peer to peer and PA process to get patient Bela.  May require step therapy with Tramadol ER although she has failed Tramadol in past.  2. Advised her to try topical steroid (Flonase spray or hydrocortisone 1% hemorrhoid cream) and allow to dry, then place Butrans patch in meantime.  3. Pt advised to call our clinical line [phone] for any symptom or side effect concerns, new clinical needs, or for refill needs per usual clinic  policies.    FOLLOW UP:  1 month    Call/encounter time: 24 min    Unable to complete visit using a video connection to the patient. A phone visit was used to complete this visits. Total time of discussion was 24 minutes.    Encounter technology:  phone

## 2020-07-06 ENCOUNTER — TELEMEDICINE (OUTPATIENT)
Dept: PSYCHIATRY | Facility: CLINIC | Age: 60
End: 2020-07-06

## 2020-07-06 DIAGNOSIS — F32.A DEPRESSION, UNSPECIFIED DEPRESSION TYPE: ICD-10-CM

## 2020-07-06 PROCEDURE — 90792 PSYCH DIAG EVAL W/MED SRVCS: CPT | Performed by: NURSE PRACTITIONER

## 2020-07-06 RX ORDER — DESVENLAFAXINE SUCCINATE 50 MG/1
50 TABLET, EXTENDED RELEASE ORAL DAILY
Qty: 14 TABLET | Refills: 0 | Status: SHIPPED | OUTPATIENT
Start: 2020-07-06 | End: 2020-07-17

## 2020-07-06 RX ORDER — FLUOXETINE HYDROCHLORIDE 40 MG/1
40 CAPSULE ORAL DAILY
Qty: 30 CAPSULE | Refills: 1 | Status: SHIPPED | OUTPATIENT
Start: 2020-07-06 | End: 2020-07-27

## 2020-07-06 NOTE — PROGRESS NOTES
"  Subjective   Renée Laurent is a  59 y.o. female who is here today for initial appointment. Patient was referred by: Dr. Toro for psychotropic medication management. Patient lives in [place], [place] by herself. 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 This was an audio and video enabled telemedicine encounter.    Chief Complaint: inability to miss a dose of Pristiq without side effects     History of Present Illness patient reports she has been on Pristiq 100 mg for about 3 years originally for management of pain. She states if she misses a dose or takes it late she will feel \"brain zaps every thirty seconds and can't function well\". She was tapered to 50 mg with thoughts of coming off but that gave her brain zaps as well. She states prior to being placed on Pristiq she was having some depressive symptoms such as difficulty making decisions and isolating. She denies depression currently. She sleeps well. Denies being over anxious or worried. Denies panic.  She does admit to having difficulty with health issues and inability to live life like she used to, see medical history below. Describing a challenge to find her meaning in life. She raised her children, worked as executive  for non profit organizations and traveled. She used to like to hike and can't anymore. Her adult children are doing well , one lives in [place], [place] as she does and one in [place]. She reports good relationships with them and grandchildren but \"they have their own lives to live\". Patient is  twice, last marriage ending about three years ago and she moved to [place] renting a house. She is on disability and does some consulting work so financially tight. She can't work full time because of co morbidities.  She denies suicidal thoughts but cared for her mother for 10 years and doesn't want to end up just existing like her mother was. She has looked at euthanasia programs in the [place] and " "has a living will.  Denies PTSD, OCD or popeye ever. Denies alcohol or illicit drug use. Denies tobacco use ever.     The following portions of the patient's history were reviewed and updated as appropriate: allergies, current medications, past family history, past medical history, past social history, past surgical history and problem list.      Past Psych History: Pristiq 100 mg for pain management and some depressive symptoms for past three years. \"half attempt at 17 yo suicide\" no SI since or treatment then. Denies inpatient treatment. Denies HI/AVH ever.     Substance Abuse: denies   Smoked cannabis when she went to [facility] to visit girl friend and smoked cannabis , denies use in [place] where it is illegal.     ABUSE HX: denies  LEGAL HX: denies     YADI REVIEWED: in EPIC      Family Psychiatric History:  family history includes Alcohol abuse in her father; Arthritis in her mother; Asthma in her sister; Cancer in her father and mother; Depression in her mother; Hashimoto's thyroiditis in her mother; Liver disease in her father; Lung cancer in her father; Osteoporosis in her mother; Thyroid disease in her mother.      Social History: raised in [facility]. She , had two children a daughter and son. After 21 years they . Patient worked in non profit organizations and was very active in enjoyed extreme hiking. She began having major health issues that effected her second marriage ending in divorce. It caused her to have to get disability and unable to work full time because of flare ups of ill health. She likes to read and write.       Medical/Surgical History:  Past Medical History:   Diagnosis Date   • Abdominal pain    • Absence of pancreas, acquired    • Allergic    • Anemia    • Anxiety    • Asthma    • Bile reflux gastritis    • Chronic illness    • Contact dermatitis     due to plants   • Depression    • Diabetes mellitus (CMS/[facility])    • Diabetes mellitus type I (CMS/[facility]) 2011    " Type 3c - surgically induced   • Eating disorder     Resolved   • Encounter for dental examination 07/2014   • Gastroparesis    • GERD (gastroesophageal reflux disease)    • History of medical problems 12/1/2011    Gastroparesis   • Hyperlipidemia    • Hypoglycemia 2011   • Incisional hernia    • Insomnia    • Iron deficiency    • Kidney stone     Left kidney   • Myeloid leukemia (CMS/HCC)    • Osteopenia    • Osteoporosis 2015    Osteopenia   • Pancreatitis    • Pneumonia 1/1/2017   • Post-splenectomy    • Vitamin D deficiency      Past Surgical History:   Procedure Laterality Date   • ABDOMINAL SURGERY     • CERVICAL DISCECTOMY ANTERIOR     • CHOLECYSTECTOMY     • COLONOSCOPY     • COSMETIC SURGERY      Rhinoplasty   • GALLBLADDER SURGERY     • GASTRIC STIMULATOR IMPLANT SURGERY  12/17/2018    [facility]   • HERNIA MESH REMOVAL  2014    abdominal hernia with mesh    • OTHER SURGICAL HISTORY      duodeum removed   • OTHER SURGICAL HISTORY      jejunem removed   • PANCREAS SURGERY      pancreas removal   • SMALL INTESTINE SURGERY  12/1/2011    Tp-ait   • STOMACH SURGERY      distal stomach    • TOTAL ABDOMINAL HYSTERECTOMY WITH SALPINGO OOPHORECTOMY     • TUBAL ABDOMINAL LIGATION     • UPPER GASTROINTESTINAL ENDOSCOPY  10/25/2017    Dr Mehta, gastritis, eosinphils in esophagus       Allergies   Allergen Reactions   • Aspirin Hives   • Phenergan [Promethazine Hcl]      Anxiety/relessness   • Reglan [Metoclopramide]      Involuntary movement       Current Medications:   Current Outpatient Medications   Medication Sig Dispense Refill   • ALPHA LIPOIC ACID PO Take 400 mg by mouth Daily.     • B Complex Vitamins (VITAMIN B COMPLEX PO) Take 1 tablet by mouth Daily.     • Biotin 38431 MCG tablet Take 1 tablet by mouth Daily.     • Buprenorphine HCl 150 MCG film Apply 1 film to cheek Every 12 (Twelve) Hours for 30 days. 60 each 0   • busPIRone (BUSPAR) 5 MG tablet TAKE ONE TABLET BY MOUTH TWICE A DAY 60 tablet 4    • coenzyme Q10 50 MG capsule capsule Take 50 mg by mouth Daily.     • desvenlafaxine (PRISTIQ) 50 MG 24 hr tablet Take 1 tablet by mouth Daily for 14 doses. 14 tablet 0   • dronabinol (MARINOL) 5 MG capsule TAKE ONE CAPSULE BY MOUTH TWICE A DAY BEFORE MEALS 60 capsule 0   • FLUoxetine (PROzac) 40 MG capsule Take 1 capsule by mouth Daily. 30 capsule 1   • glucagon (GLUCAGON EMERGENCY) 1 MG injection Inject 1 mg into the appropriate muscle as directed by prescriber 1 (One) Time As Needed for Low Blood Sugar for up to 1 dose. 1 kit 11   • glucose blood test strip Used to test blood sugar 4 times daily for diabetes. E10.9 150 each 12   • insulin lispro (Admelog) 100 UNIT/ML injection Use in pump 0.45 units per hour basal dose with 2 unit per 30 g of carb bolus,with meals or snacks. Maximum 45 units per day E10.9 20 mL 3   • MAGNESIUM GLYCINATE PLUS PO Take 1 tablet by mouth Daily.     • Medium Chain Triglycerides (MCT OIL PO) Take 15 mL by mouth Daily.     • Multiple Vitamins-Minerals (AQUADEKS PO) Take  by mouth.     • OLANZapine (zyPREXA) 2.5 MG tablet Take 1 tablet by mouth Every Night for 360 days. 90 tablet 3   • ondansetron (ZOFRAN) 4 MG tablet TAKE ONE TABLET BY MOUTH EVERY 8 HOURS AS NEEDED FOR NAUSEA AND VOMITING 60 tablet 0   • pancrelipase, Lip-Prot-Amyl, (CREON) 31981-00355 units capsule delayed-release particles capsule Take 4-5 tablets tid with meals and 2-3 with snack 720 capsule 4   • pantoprazole (PROTONIX) 40 MG EC tablet Take 40 mg by mouth Daily.     • Probiotic Product (PROBIOTIC DAILY PO) Take  by mouth.     • Unable to find Take 500 mg by mouth Daily. Bacopa     • Unable to find Take 1 each by mouth Daily. Med Name:Ashwaghanda     • Unable to find 1 each 1 (One) Time. Med Name: Triphala     • Unable to find Take 1 each by mouth Daily. Med Name: Calcium     • vitamin C (ASCORBIC ACID) 500 MG tablet Take 500 mg by mouth Daily.     • vitamin D (ERGOCALCIFEROL) 91725 UNITS capsule capsule TAKE ONE  CAPSULE BY MOUTH EVERY 7 DAYS 4 capsule 0     No current facility-administered medications for this visit.        Lab Results:  Lab on 02/20/2020   Component Date Value Ref Range Status   • Glucose 02/20/2020 105* 65 - 99 mg/dL Final   • BUN 02/20/2020 14  8 - 23 mg/dL Final   • Creatinine 02/20/2020 0.78  0.57 - 1.00 mg/dL Final   • Sodium 02/20/2020 137  136 - 145 mmol/L Final   • Potassium 02/20/2020 4.4  3.5 - 5.2 mmol/L Final   • Chloride 02/20/2020 96* 98 - 107 mmol/L Final   • CO2 02/20/2020 29.0  22.0 - 29.0 mmol/L Final   • Calcium 02/20/2020 9.0  8.6 - 10.5 mg/dL Final   • Total Protein 02/20/2020 8.7* 6.0 - 8.5 g/dL Final   • Albumin 02/20/2020 4.40  3.50 - 5.20 g/dL Final   • ALT (SGPT) 02/20/2020 15  1 - 33 U/L Final   • AST (SGOT) 02/20/2020 22  1 - 32 U/L Final   • Alkaline Phosphatase 02/20/2020 177* 39 - 117 U/L Final   • Total Bilirubin 02/20/2020 0.3  0.2 - 1.2 mg/dL Final   • eGFR Non African Amer 02/20/2020 75  >60 mL/min/1.73 Final   • Globulin 02/20/2020 4.3  gm/dL Final   • A/G Ratio 02/20/2020 1.0  g/dL Final   • BUN/Creatinine Ratio 02/20/2020 17.9  7.0 - 25.0 Final   • Anion Gap 02/20/2020 12.0  5.0 - 15.0 mmol/L Final   • Prealbumin 02/20/2020 17.9* 20.0 - 40.0 mg/dL Final   • Hemoglobin A1C 02/20/2020 6.60* 4.80 - 5.60 % Final   • WBC 02/20/2020 12.80* 3.40 - 10.80 10*3/mm3 Final   • RBC 02/20/2020 4.80  3.77 - 5.28 10*6/mm3 Final   • Hemoglobin 02/20/2020 13.7  12.0 - 15.9 g/dL Final   • Hematocrit 02/20/2020 42.8  34.0 - 46.6 % Final   • RDW 02/20/2020 14.0  12.3 - 15.4 % Final   • MCV 02/20/2020 89.2  79.0 - 97.0 fL Final   • MCH 02/20/2020 28.5  26.6 - 33.0 pg Final   • MCHC 02/20/2020 32.0  31.5 - 35.7 g/dL Final   • MPV 02/20/2020 9.3  6.0 - 12.0 fL Final   • Platelets 02/20/2020 392  140 - 450 10*3/mm3 Final   • Neutrophil % 02/20/2020 50.6  42.7 - 76.0 % Final   • Lymphocyte % 02/20/2020 44.2  19.6 - 45.3 % Final   • Monocyte % 02/20/2020 5.2  5.0 - 12.0 % Final   • Neutrophils,  Absolute 02/20/2020 6.50  1.70 - 7.00 10*3/mm3 Final   • Lymphocytes, Absolute 02/20/2020 5.70* 0.70 - 3.10 10*3/mm3 Final   • Monocytes, Absolute 02/20/2020 0.70  0.10 - 0.90 10*3/mm3 Final   • THC, Screen, Urine 02/20/2020 Positive* Negative Final   • Phencyclidine (PCP), Urine 02/20/2020 Negative  Negative Final   • Cocaine Screen, Urine 02/20/2020 Negative  Negative Final   • Methamphetamine, Ur 02/20/2020 Negative  Negative Final   • Opiate Screen 02/20/2020 Negative  Negative Final   • Amphetamine Screen, Urine 02/20/2020 Negative  Negative Final   • Benzodiazepine Screen, Urine 02/20/2020 Negative  Negative Final   • Tricyclic Antidepressants Screen 02/20/2020 Negative  Negative Final   • Methadone Screen, Urine 02/20/2020 Negative  Negative Final   • Barbiturates Screen, Urine 02/20/2020 Negative  Negative Final   • Oxycodone Screen, Urine 02/20/2020 Negative  Negative Final   • Propoxyphene Screen 02/20/2020 Negative  Negative Final   • Buprenorphine, Screen, Urine 02/20/2020 Negative  Negative Final         Review of Systems Constitutional: Negative for appetite change, chills, diaphoresis, fatigue, fever and unexpected weight change.   HENT: Negative for hearing loss, sore throat, trouble swallowing and voice change.    Eyes: Negative for photophobia and visual disturbance.   Respiratory: Negative for cough, chest tightness and shortness of breath.    Cardiovascular: Negative for chest pain and palpitations.   Gastrointestinal: Negative for abdominal pain, constipation, nausea and vomiting.   Endocrine: Negative for cold intolerance and heat intolerance.   Genitourinary: Negative for dysuria and frequency.   Musculoskeletal: Negative for arthralgia, back pain, joint swelling and neck stiffness.   Skin: Negative for color change and wound.   Allergic/Immunologic: Negative for environmental allergies and immunocompromised state.   Neurological: Negative for dizziness, tremors, seizures, syncope, weakness,  light-headedness and headaches.   Hematological: Negative for adenopathy. Does not bruise/bleed easily.    Objective   Physical Exam  There were no vitals taken for this visit.      Mental Status Exam:   Appearance: appropriate  Hygiene:   good  Cooperation:  Cooperative  Eye Contact:  Good  Psychomotor Behavior:  Appropriate  Mood:  within normal limits  Affect:  Appropriate  Hopelessness: Denies  Speech:  Normal  Thought Process:  Linear  Thought Content:  Normal  Suicidal:  None  Homicidal:  None  Hallucinations:  None  Delusion:  None  Memory:  Intact  Orientation:  Person, Place, Time and Situation  Reliability:  good  Insight:  Good  Judgement:  Good  Impulse Control:  Good  Physical/Medical Issues:  Yes see medical history list above      Short-term goals: Patient will be compliant with clinic appointments.  Patient will be engaged in therapy, medication compliant with minimal side effects. Patient  will report decrease of symptoms and frequency.    Long-term goals: Patient will have minimal symptoms of mental health disorder with continued treatment. Patient will be compliant with treatment and appointments.       Problem list:   Strengths: patient appears motivated for treatment          Assessment/Plan   Diagnoses and all orders for this visit:    Depression, unspecified depression type    Other orders  -     FLUoxetine (PROzac) 40 MG capsule; Take 1 capsule by mouth Daily.  -     desvenlafaxine (PRISTIQ) 50 MG 24 hr tablet; Take 1 tablet by mouth Daily for 14 doses.        A psychological evaluation was conducted in order to assess past and current level of functioning. Areas assessed included, but were not limited to: perception of social support, perception of ability to face and deal with challenges in life (positive functioning), anxiety symptoms, depressive symptoms, perspective on beliefs/belief system, coping skills for stress, intelligence level,  Therapeutic rapport was established. Interventions  conducted today were geared towards incorporating medication management along with support for continued therapy. Education was also provided as to the med management with this provider and what to expect in subsequent sessions.    Assisted patient in processing above session content; acknowledged and normalized patient’s thoughts, feelings, and concerns.  Applied  positive coping skills and behavior management in session.  Allowed patient to freely discuss issues without interruption or judgment. Provided safe, confidential environment to facilitate the development of positive therapeutic relationship and encourage open, honest communication. Assisted patient in identifying risk factors which would indicate the need for higher level of care including thoughts to harm self or others and/or self-harming behavior and encouraged patient to contact this office, call 911, or present to the nearest emergency room should any of these events occur. Discussed crisis intervention services and means to access.  Patient adamantly and convincingly denies current suicidal or homicidal ideation or perceptual disturbance.    Discussed diagnosis and recommendations for treatment:    PROVIDE: Cognitive Behavioral Therapy and Solution Focused Therapy to improve functioning, maintain stability, and avoid decompensation and the need for higher level of care. RECOMMENDED WOMEN RAIN LEGGETT, by NARA SANTIAGO,  Discussed this is for meaning in this phase of life with its challenges    MEDICATION MANAGEMENT RECOMMENDATIONS:  Will start on fluoxetine 40mg along with Pristiq 100mg x 2 days then reduce to 50mg x 7 days then stop taking fluoxetine 40mg daily , call for any questions or concerns. F/u in two weeks    We discussed risks, benefits,goals and side effects of the above medication and the patient was agreeable with the plan.Patient was educated on the importance of compliance with treatment and follow-up appointments.To call for  questions or concerns and return early if necessary. Crisis plan reviewed including going to the Emergency department.       Treatment Plan: stabilize mood,  patient will stay out of the hospital and be at optimal level of functioning, take all medication as prescribed. Patient verbalized  understanding and agreement to plan.      Return in about 2 weeks (around 7/20/2020).

## 2020-07-13 ENCOUNTER — TELEMEDICINE (OUTPATIENT)
Dept: GASTROENTEROLOGY | Facility: CLINIC | Age: 60
End: 2020-07-13

## 2020-07-13 DIAGNOSIS — K29.60 BILE REFLUX GASTRITIS: Primary | ICD-10-CM

## 2020-07-13 PROCEDURE — 99214 OFFICE O/P EST MOD 30 MIN: CPT | Performed by: INTERNAL MEDICINE

## 2020-07-13 RX ORDER — SUCRALFATE 1 G/1
1 TABLET ORAL 4 TIMES DAILY
Qty: 90 TABLET | Refills: 3 | Status: SHIPPED | OUTPATIENT
Start: 2020-07-13 | End: 2021-05-07

## 2020-07-13 NOTE — PROGRESS NOTES
PCP: Karla Trores MD    No chief complaint on file.  cc: abdominal pain    History of Present Illness:   Renée Laurent is a 59 y.o. female who presents to GI clinic as a follow up for history of chronic pancreatitis s/p auto islet cell transplant 2011 ([facility]) with total pancreatectomy and gastrojejunostomy, Gastroparesis s/p gastric pacemaker 2018 ([facility]). She has persistent discomfort. She reports epigastric intermittent achiness that can last all day. Her nausea and vomiting have improved since gastric pacemaker. She has actually gained wt.  No melena, no hematochezia.    Past Medical History:   Diagnosis Date   • Abdominal pain    • Absence of pancreas, acquired    • Allergic    • Anemia    • Anxiety    • Asthma    • Bile reflux gastritis    • Chronic illness    • Contact dermatitis     due to plants   • Depression    • Diabetes mellitus (CMS/HCC)    • Diabetes mellitus type I (CMS/HCC) 2011    Type 3c - surgically induced   • Eating disorder     Resolved   • Encounter for dental examination 07/2014   • Gastroparesis    • GERD (gastroesophageal reflux disease)    • History of medical problems 12/1/2011    Gastroparesis   • Hyperlipidemia    • Hypoglycemia 2011   • Incisional hernia    • Insomnia    • Iron deficiency    • Kidney stone     Left kidney   • Myeloid leukemia (CMS/HCC)    • Osteopenia    • Osteoporosis 2015    Osteopenia   • Pancreatitis    • Pneumonia 1/1/2017   • Post-splenectomy    • Vitamin D deficiency        Past Surgical History:   Procedure Laterality Date   • ABDOMINAL SURGERY     • CERVICAL DISCECTOMY ANTERIOR     • CHOLECYSTECTOMY     • COLONOSCOPY     • COSMETIC SURGERY      Rhinoplasty   • GALLBLADDER SURGERY     • GASTRIC STIMULATOR IMPLANT SURGERY  12/17/2018    [facility]   • HERNIA MESH REMOVAL  2014    abdominal hernia with mesh    • OTHER SURGICAL HISTORY      duodeum removed   • OTHER SURGICAL HISTORY      jejunem removed   • PANCREAS SURGERY      pancreas removal    • SMALL INTESTINE SURGERY  12/1/2011    Tp-ait   • STOMACH SURGERY      distal stomach    • TOTAL ABDOMINAL HYSTERECTOMY WITH SALPINGO OOPHORECTOMY     • TUBAL ABDOMINAL LIGATION     • UPPER GASTROINTESTINAL ENDOSCOPY  10/25/2017    Dr Mehta, gastritis, eosinphils in esophagus         Current Outpatient Medications:   •  ALPHA LIPOIC ACID PO, Take 400 mg by mouth Daily., Disp: , Rfl:   •  B Complex Vitamins (VITAMIN B COMPLEX PO), Take 1 tablet by mouth Daily., Disp: , Rfl:   •  Biotin 49438 MCG tablet, Take 1 tablet by mouth Daily., Disp: , Rfl:   •  Buprenorphine HCl 150 MCG film, Apply 1 film to cheek Every 12 (Twelve) Hours for 30 days., Disp: 60 each, Rfl: 0  •  busPIRone (BUSPAR) 5 MG tablet, TAKE ONE TABLET BY MOUTH TWICE A DAY, Disp: 60 tablet, Rfl: 4  •  coenzyme Q10 50 MG capsule capsule, Take 50 mg by mouth Daily., Disp: , Rfl:   •  desvenlafaxine (PRISTIQ) 50 MG 24 hr tablet, Take 1 tablet by mouth Daily for 14 doses., Disp: 14 tablet, Rfl: 0  •  dronabinol (MARINOL) 5 MG capsule, TAKE ONE CAPSULE BY MOUTH TWICE A DAY BEFORE MEALS, Disp: 60 capsule, Rfl: 0  •  FLUoxetine (PROzac) 40 MG capsule, Take 1 capsule by mouth Daily., Disp: 30 capsule, Rfl: 1  •  glucagon (GLUCAGON EMERGENCY) 1 MG injection, Inject 1 mg into the appropriate muscle as directed by prescriber 1 (One) Time As Needed for Low Blood Sugar for up to 1 dose., Disp: 1 kit, Rfl: 11  •  glucose blood test strip, Used to test blood sugar 4 times daily for diabetes. E10.9, Disp: 150 each, Rfl: 12  •  insulin lispro (Admelog) 100 UNIT/ML injection, Use in pump 0.45 units per hour basal dose with 2 unit per 30 g of carb bolus,with meals or snacks. Maximum 45 units per day E10.9, Disp: 20 mL, Rfl: 3  •  MAGNESIUM GLYCINATE PLUS PO, Take 1 tablet by mouth Daily., Disp: , Rfl:   •  Medium Chain Triglycerides (MCT OIL PO), Take 15 mL by mouth Daily., Disp: , Rfl:   •  Multiple Vitamins-Minerals (AQUADEKS PO), Take  by mouth., Disp: , Rfl:   •   OLANZapine (zyPREXA) 2.5 MG tablet, Take 1 tablet by mouth Every Night for 360 days., Disp: 90 tablet, Rfl: 3  •  ondansetron (ZOFRAN) 4 MG tablet, TAKE ONE TABLET BY MOUTH EVERY 8 HOURS AS NEEDED FOR NAUSEA AND VOMITING, Disp: 60 tablet, Rfl: 0  •  pancrelipase, Lip-Prot-Amyl, (CREON) 30621-45295 units capsule delayed-release particles capsule, Take 4-5 tablets tid with meals and 2-3 with snack, Disp: 720 capsule, Rfl: 4  •  pantoprazole (PROTONIX) 40 MG EC tablet, Take 40 mg by mouth Daily., Disp: , Rfl:   •  Probiotic Product (PROBIOTIC DAILY PO), Take  by mouth., Disp: , Rfl:   •  Unable to find, Take 500 mg by mouth Daily. Bacopa, Disp: , Rfl:   •  Unable to find, Take 1 each by mouth Daily. Med Name:Ashwaghanda, Disp: , Rfl:   •  Unable to find, 1 each 1 (One) Time. Med Name: Triphala, Disp: , Rfl:   •  Unable to find, Take 1 each by mouth Daily. Med Name: Calcium, Disp: , Rfl:   •  vitamin C (ASCORBIC ACID) 500 MG tablet, Take 500 mg by mouth Daily., Disp: , Rfl:   •  vitamin D (ERGOCALCIFEROL) 04612 UNITS capsule capsule, TAKE ONE CAPSULE BY MOUTH EVERY 7 DAYS, Disp: 4 capsule, Rfl: 0    Allergies   Allergen Reactions   • Aspirin Hives   • Phenergan [Promethazine Hcl]      Anxiety/relessness   • Reglan [Metoclopramide]      Involuntary movement       Family History   Problem Relation Age of Onset   • Osteoporosis Mother    • Cancer Mother         CLL   • Hashimoto's thyroiditis Mother    • Arthritis Mother    • Depression Mother    • Thyroid disease Mother         Hashimoto’s   • Lung cancer Father    • Alcohol abuse Father    • Cancer Father         Lung   • Liver disease Father    • Asthma Sister        Social History     Socioeconomic History   • Marital status: Single     Spouse name: Not on file   • Number of children: Not on file   • Years of education: Not on file   • Highest education level: Not on file   Tobacco Use   • Smoking status: Never Smoker   • Smokeless tobacco: Never Used   Substance and  Sexual Activity   • Alcohol use: No   • Drug use: No   • Sexual activity: Not Currently     Partners: Male     Birth control/protection: Post-menopausal       Review of Systems      There were no vitals filed for this visit.    Physical Exam  General: well developed, well nourished appearing on telemedicine  A+O x 3 NAD  HEENT: NCAT, pupils equal appearing, sclera appear white  NECK: full ROM  Respiratory: symmetric chest rise, normal effort, normal work of breathing, no overt rales  Abomen: non-distended  Skin: normal color, no jaundice  Neuro: no tremor, no facial droop  Psych: normal mood and affect      Assessment/Plan  1.) s/p total pancreatectomy with auto islet cell transplantation due to chronic pancreatitis, 2011  2.) s/p gastrojejunostomy, 2011  3.) Brittle diabetes  Unfortunately she has brittle diabetes and requires supplementation. She will continue following with endocrinology    4.) Chronic abdominal pain  5.) Bile reflux  Continue PPI, I will start carafate pills in attempt to help coat stomach and maybe alleviate some bile gastropathy. I will also perform EGD to evaluate her anatomy and rule out PUD, other lesions.  Future decision: creon, abx, probiotics    6.) gastroparesis s/p gastric pacemaker 2018, dr. Cantrell  She will continue diet therapy. She is maintaining wt. Continue f/u with motility specialist    7.) HCM  Screening colonoscopy    RTC 4 months    Kiko Mehta MD  7/13/2020

## 2020-07-16 ENCOUNTER — PATIENT MESSAGE (OUTPATIENT)
Dept: PALLIATIVE CARE | Facility: CLINIC | Age: 60
End: 2020-07-16

## 2020-07-16 DIAGNOSIS — R10.9 CHRONIC ABDOMINAL PAIN: Primary | ICD-10-CM

## 2020-07-16 DIAGNOSIS — G89.29 CHRONIC ABDOMINAL PAIN: Primary | ICD-10-CM

## 2020-07-16 RX ORDER — HYDROMORPHONE HYDROCHLORIDE 2 MG/1
2 TABLET ORAL 2 TIMES DAILY
Qty: 14 TABLET | Refills: 0 | Status: SHIPPED | OUTPATIENT
Start: 2020-07-16 | End: 2020-07-23

## 2020-07-16 NOTE — PROGRESS NOTES
Continued delay for PA of [place] despite several appeals.  Possible authorization by next week.  Will e-scribe Dilaudid 2mg BID for one week.  [facility], will e-scribe another 7 day of Dilaudid.  Pt has f/u with me 7/30/20.

## 2020-07-17 RX ORDER — DESVENLAFAXINE SUCCINATE 50 MG/1
TABLET, EXTENDED RELEASE ORAL
Qty: 14 TABLET | Refills: 0 | Status: SHIPPED | OUTPATIENT
Start: 2020-07-17 | End: 2020-07-27

## 2020-07-20 ENCOUNTER — TELEMEDICINE (OUTPATIENT)
Dept: PSYCHIATRY | Facility: CLINIC | Age: 60
End: 2020-07-20

## 2020-07-20 DIAGNOSIS — F32.A DEPRESSION, UNSPECIFIED DEPRESSION TYPE: Primary | ICD-10-CM

## 2020-07-20 PROCEDURE — 99212 OFFICE O/P EST SF 10 MIN: CPT | Performed by: NURSE PRACTITIONER

## 2020-07-20 NOTE — PROGRESS NOTES
"  Subjective   Renée Laurent is a 59 y.o. female who is here today for medication management follow up. This was an audio and video enabled telemedicine encounter.    Time in 12:50pm  Time out 1:00 pm     Chief Complaint: depression     History of Present Illness Patient reports tolerating going down on Pristig to 50mg with adding fluoxetine 40mg daily. She reports she will be done with the seven days of both and will stop the Pristiq in two days. She is to cont the fluoxetine 40mg daily. She denies depressive symptoms and denies \"brain zapping\". Will f/u in two weeks but knows to call for questions or concerns. Sleeping well, denies being anxious. Staying cool in these 90 degree weather, has air conditioning .  Denies adverse effects from medications.   (Scales based on 0 - 10 with 10 being the worst)          The following portions of the patient's history were reviewed and updated as appropriate: allergies, current medications, past family history, past medical history, past social history, past surgical history and problem list.    Review of Systems  A 14 point review of systems was performed and is negative except as noted above.    Objective   Physical Exam  There were no vitals taken for this visit.    Allergies   Allergen Reactions   • Aspirin Hives   • Phenergan [Promethazine Hcl]      Anxiety/relessness   • Reglan [Metoclopramide]      Involuntary movement       Current Medications:   Current Outpatient Medications   Medication Sig Dispense Refill   • ALPHA LIPOIC ACID PO Take 400 mg by mouth Daily.     • B Complex Vitamins (VITAMIN B COMPLEX PO) Take 1 tablet by mouth Daily.     • Biotin 46203 MCG tablet Take 1 tablet by mouth Daily.     • Buprenorphine HCl 150 MCG film Apply 1 film to cheek Every 12 (Twelve) Hours for 30 days. 60 each 0   • busPIRone (BUSPAR) 5 MG tablet TAKE ONE TABLET BY MOUTH TWICE A DAY 60 tablet 4   • coenzyme Q10 50 MG capsule capsule Take 50 mg by mouth Daily.     • " desvenlafaxine (PRISTIQ) 50 MG 24 hr tablet TAKE ONE TABLET BY MOUTH DAILY FOR 14 DOSES 14 tablet 0   • dronabinol (MARINOL) 5 MG capsule TAKE ONE CAPSULE BY MOUTH TWICE A DAY BEFORE MEALS 60 capsule 0   • FLUoxetine (PROzac) 40 MG capsule Take 1 capsule by mouth Daily. 30 capsule 1   • glucagon (GLUCAGON EMERGENCY) 1 MG injection Inject 1 mg into the appropriate muscle as directed by prescriber 1 (One) Time As Needed for Low Blood Sugar for up to 1 dose. 1 kit 11   • glucose blood test strip Used to test blood sugar 4 times daily for diabetes. E10.9 150 each 12   • HYDROmorphone (DILAUDID) 2 MG tablet Take 1 tablet by mouth 2 (two) times a day for 7 days. For chronic abdominal pain 14 tablet 0   • insulin lispro (Admelog) 100 UNIT/ML injection Use in pump 0.45 units per hour basal dose with 2 unit per 30 g of carb bolus,with meals or snacks. Maximum 45 units per day E10.9 20 mL 3   • MAGNESIUM GLYCINATE PLUS PO Take 1 tablet by mouth Daily.     • Medium Chain Triglycerides (MCT OIL PO) Take 15 mL by mouth Daily.     • Multiple Vitamins-Minerals (AQUADEKS PO) Take  by mouth.     • OLANZapine (zyPREXA) 2.5 MG tablet Take 1 tablet by mouth Every Night for 360 days. 90 tablet 3   • ondansetron (ZOFRAN) 4 MG tablet TAKE ONE TABLET BY MOUTH EVERY 8 HOURS AS NEEDED FOR NAUSEA AND VOMITING 60 tablet 0   • pancrelipase, Lip-Prot-Amyl, (CREON) 67279-76008 units capsule delayed-release particles capsule Take 4-5 tablets tid with meals and 2-3 with snack 720 capsule 4   • pantoprazole (PROTONIX) 40 MG EC tablet Take 40 mg by mouth Daily.     • Probiotic Product (PROBIOTIC DAILY PO) Take  by mouth.     • sucralfate (Carafate) 1 g tablet Take 1 tablet by mouth 4 (Four) Times a Day. 90 tablet 3   • Unable to find Take 500 mg by mouth Daily. Bacopa     • Unable to find Take 1 each by mouth Daily. Med Name:Ashwaghanda     • Unable to find 1 each 1 (One) Time. Med Name: Triphala     • Unable to find Take 1 each by mouth Daily. Med  Name: Calcium     • vitamin C (ASCORBIC ACID) 500 MG tablet Take 500 mg by mouth Daily.     • vitamin D (ERGOCALCIFEROL) 84390 UNITS capsule capsule TAKE ONE CAPSULE BY MOUTH EVERY 7 DAYS 4 capsule 0     No current facility-administered medications for this visit.      Appearance: appropriate  Hygiene:   good  Cooperation:  Cooperative  Eye Contact:  Good  Psychomotor Behavior:  No psychomotor agitation/retardation, No EPS, No motor tics  Mood:  within normal limits  Affect:  Appropriate  Hopelessness: Denies  Speech:  Normal  Thought Process:  Linear  Thought Content:  Normal  Concentration: Normal   Suicidal:  None  Homicidal:  None  Hallucinations:  None  Delusion:  None  Memory:  Intact  Orientation:  Person, Place, Time and Situation  Reliability:  good  Insight:  good  Judgement: good  Impulse Control: good  Estimated Intelligence: average range      Assessment/Plan   Diagnoses and all orders for this visit:    Depression, unspecified depression type      IMPRESSION: stable mood, no side effects from weaning off Pristiq so far     PLAN:   After two days stop Pristiq  Cont fluoxetine 40mg daily     We discussed risks, benefits, and side effects of the above medications and the patient was agreeable with the plan. Patient was educated on the importance of compliance with treatment and follow-up appointments.     Counseled patient regarding multimodal approach with encouragement of healthy nutrition, healthy sleep, regular physical mobility, social involvement, counseling, and medication compliance.     Assisted patient in identifying risk factors which would indicate the need for higher level of care including thoughts to harm self or others and/or self-harming behavior and encouraged patient to contact this office, call 911, or present to the nearest emergency room should any of these events occur. Discussed crisis intervention services and means to access.  Patient adamantly and convincingly denies current  suicidal or homicidal ideation or perceptual disturbance.    Treatment Plan: stabilize mood, patient will stay out of psychiatric hospital and be at optimal level of functioning with therapy and take all medication as prescribed. Patient verbalized  understanding and agreement to plan.    Instructed to call for questions or concerns and return early if necessary.     Return in about 2 weeks (around 8/3/2020).

## 2020-07-27 ENCOUNTER — TELEPHONE (OUTPATIENT)
Dept: ONCOLOGY | Facility: CLINIC | Age: 60
End: 2020-07-27

## 2020-07-27 RX ORDER — FLUOXETINE HYDROCHLORIDE 20 MG/1
20 CAPSULE ORAL DAILY
Qty: 30 CAPSULE | Refills: 0 | Status: SHIPPED | OUTPATIENT
Start: 2020-07-27 | End: 2020-09-15

## 2020-07-27 NOTE — TELEPHONE ENCOUNTER
I spoke with pt and will you cancel her next appt with me and lets push it out to Aug 24th at 1pm by mak for 1/2 hour, thanks           Patient has some questions about medication and is requesting a call back from you.

## 2020-07-27 NOTE — TELEPHONE ENCOUNTER
Patient is not having any side effects from stopping Pristiq and placed on fluoxetine 40mg daily. She would like to wean down off fluoxetine as well as she has been on antidepressants for years and not sure she even needs to be on them since in more stable phase of life. Discussed pros and cons. Will lower fluoxetine to 20mg RBSE and goals discussed and will f/u in 4 weeks with pt. She is to call for any questions or concerns, she is in agreement to plan.

## 2020-08-02 DIAGNOSIS — K31.84 DIABETIC GASTROPARESIS ASSOCIATED WITH TYPE 1 DIABETES MELLITUS (HCC): ICD-10-CM

## 2020-08-02 DIAGNOSIS — E10.43 DIABETIC GASTROPARESIS ASSOCIATED WITH TYPE 1 DIABETES MELLITUS (HCC): ICD-10-CM

## 2020-08-02 DIAGNOSIS — R63.0 DECREASE IN APPETITE: ICD-10-CM

## 2020-08-04 ENCOUNTER — APPOINTMENT (OUTPATIENT)
Dept: BONE DENSITY | Facility: HOSPITAL | Age: 60
End: 2020-08-04

## 2020-08-04 RX ORDER — DRONABINOL 5 MG/1
CAPSULE ORAL
Qty: 60 CAPSULE | Refills: 0 | Status: SHIPPED | OUTPATIENT
Start: 2020-08-04 | End: 2020-09-04 | Stop reason: SDUPTHER

## 2020-08-07 ENCOUNTER — APPOINTMENT (OUTPATIENT)
Dept: PREADMISSION TESTING | Facility: HOSPITAL | Age: 60
End: 2020-08-07

## 2020-08-11 ENCOUNTER — TELEMEDICINE (OUTPATIENT)
Dept: PALLIATIVE CARE | Facility: CLINIC | Age: 60
End: 2020-08-11

## 2020-08-11 DIAGNOSIS — E11.43 GASTROPARESIS DUE TO DM (HCC): ICD-10-CM

## 2020-08-11 DIAGNOSIS — Z02.89 PAIN MEDICATION AGREEMENT SIGNED: ICD-10-CM

## 2020-08-11 DIAGNOSIS — R10.9 CHRONIC ABDOMINAL PAIN: Primary | ICD-10-CM

## 2020-08-11 DIAGNOSIS — K31.84 GASTROPARESIS DUE TO DM (HCC): ICD-10-CM

## 2020-08-11 DIAGNOSIS — G89.29 CHRONIC ABDOMINAL PAIN: Primary | ICD-10-CM

## 2020-08-11 PROCEDURE — 99442 PR PHYS/QHP TELEPHONE EVALUATION 11-20 MIN: CPT | Performed by: INTERNAL MEDICINE

## 2020-08-11 NOTE — PROGRESS NOTES
Pt prefers telemedicine encounters due to chronic debilitating nausea and abdominal pain, h/o frequent missed In Clinic appts with all healthcare providers.    Patient verbally consented to this telehealth encounter.    Background:  PCP: Karla Torres MD  Referring Provider: Karla Torres MD  Reason for Referral: Depression, fatigue, gastroparesis     Renée is a 59yoF with h/o T1DM, gastroparesis s/p permanent gastric pacemaker placement on 12/17/2018. Also with h/o anxiety, depression.  She has had chronic abdominal pain since she was a teenager, with acute worsening in 2005 associated with significant weight loss. She was diagnosed with pancreatitis and in fact was found to have pancreatic divisum.  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  Her most troublesome symptom now is gastroparesis. She failed metoclopramide therapy due to development of Parkinsonian symptoms. She underwent placement of gastic pacemaker placement in 12/17/2018 though unfortunately has continued to have some gastroparesis. Pain: complains of pain due to chronic pancreatitis, nerve pain due to nerve damage.  Epigastric bloating pain and dull ache.  Impairs sleep - sleeps only 30 min at a time then up again due to pain.  Limits her activity outside of home. As of 2020, does not even leave home weekly.  Many missed medical appts due to intractable nausea +/- emesis and pain.  Has been on opioid therapy, Gabapentin, Pristiq, has undergone neurolytic interventions for abdominal pain.  Has concurrent chronic R arm radicular pain and cervical neck pain s/p diskectomy/fusion/rhizotomy. However, the debilitating pain is of the abdomen with nausea.  See 2/20/20 initial consultation note for detailed history.     Noted that PCP is managing patient's issues re: thyroid and vitamins.    Care coordination messages:  [facility] will not manage pacemaker, so she will need to continue f/u with [facility] for  "pacemaker.  She was referred to [facility] for general GI f/u, has telemedicine visit scheduled 7/13/20.       Referred to Dr. Kumar for Interventional Pain options. Consideration of IT pump as has not had very effective response to neurolytic blocks in past.  However, FDA delay with Medtronic pumps.       Interim history (chart review):  Working with Macy Goldman, tapering off Pristiq with Prozac.    Interim history (per patient):  She did a telemedicine call with Dr. Tarango, and so she might be a candidate for IT pump or SCS in future.  Pt understands delay with Dr. Kumar's schedule.  Not a pressing priority at this time.      After much insurance delay, got Belbuca, at 150mcg BID.  Still increase in abdominal pain radiating to the back pain, that keeps her from leaving home frequently.  Will increase with activity, household duties, walking or hiking, and eating.      Goal:  Not having to rest for a day after activity    ANALGESIA:  Improved, but still affecting her activity.  ADVERSE EFFECTS:  Temporary taste change few hours after buccal dose.  Dry mouth with Belbuca (also with Marinol).  Constipation but not clearly worse with Belbca, is chronic issue.    ACTIVITY:  Was able to have family visit her and set up the backyard for them.    AFFECT:  Down to Prozac 20mg, \"not a single brain zap\" with switch from Pristiq to Prozac.  ABERRANT BEHAVIORS:  none      ECOG: (2) Ambulatory and capable of self care, unable to carry out work activity, up and about > 50% or waking hours    Physical Exam:  General:  Alert, conversant  Pulm:  No cough, no wheeze, no breathlessness with activity    Risk assessment:  YADI #:  [id number].  Primary care refilling her Dronabinol.    Medication count (per patient/caregiver):  #22 films    UDS:  THC, Screen, Urine   Date Value Ref Range Status   02/20/2020 Positive (A) Negative Final     Phencyclidine (PCP), Urine   Date Value Ref Range Status   02/20/2020 Negative Negative Final "     Cocaine Screen, Urine   Date Value Ref Range Status   02/20/2020 Negative Negative Final     Methamphetamine, Ur   Date Value Ref Range Status   02/20/2020 Negative Negative Final     Opiate Screen   Date Value Ref Range Status   02/20/2020 Negative Negative Final     Amphetamine Screen, Urine   Date Value Ref Range Status   02/20/2020 Negative Negative Final     Benzodiazepine Screen, Urine   Date Value Ref Range Status   02/20/2020 Negative Negative Final     Tricyclic Antidepressants Screen   Date Value Ref Range Status   02/20/2020 Negative Negative Final     Methadone Screen, Urine   Date Value Ref Range Status   02/20/2020 Negative Negative Final     Barbiturates Screen, Urine   Date Value Ref Range Status   02/20/2020 Negative Negative Final     Oxycodone Screen, Urine   Date Value Ref Range Status   02/20/2020 Negative Negative Final     Propoxyphene Screen   Date Value Ref Range Status   02/20/2020 Negative Negative Final     Buprenorphine, Screen, Urine   Date Value Ref Range Status   02/20/2020 Negative Negative Final     Palliative Performance Scale  Palliative Performance Scale Score: 70%    [place] Symptom Assessment System Revised  Pain Score: 6   ESAS Tiredness Score: 4  ESAS Nausea Score: 8  ESAS Depression Score: 1  ESAS Anxiety Score: 1  ESAS Drowsiness Score: 4  ESAS Lack of Appetite Score: 6  ESAS Wellbeing Score: 1  ESAS Dyspnea Score: 4  ESAS Source of Information: patient    Controlled medication tracking flowsheet reviewed.  See attached    Universal precautions:    ORT risk score (ORT-OUD and/or COMM):  Low score for her.  Noted she has son getting treatment for OUD  Aberrant behavior:  none  Indication:  Chronic abdominal pain, not responsive to dietary changes, gastric pacemaker  OME:  N/a buprenorphine   Naloxone prescribed:  no     Assessment/Plan   Diagnoses and all orders for this visit:    Chronic abdominal pain    Gastroparesis due to DM (CMS/[facility])    Pain medication agreement  signed                 Plan:  1. Increase Belbuca from 150mcg to 300mcg q12h.  She will have 5 day supply to start this today.  If tolerated without increase in sedation, severe constipation, and improvement in pain experienced, will e-scribe this dose.  2. Pt advised to call our clinical line [phone] for any symptom or side effect concerns, new clinical needs, or for refill needs per usual clinic policies.    FOLLOW UP:  1 month    Call/encounter time: 11 minutes    Unable to complete visit using a video connection to the patient. A phone visit was used to complete this visits. Total time of discussion was 11 minutes.    Encounter technology:  phone

## 2020-08-11 NOTE — PROGRESS NOTES
You have chosen to receive care through a telephone visit. Do you consent to use a telephone visit for your medical care today? Yes    Med Counts  Medication Filled # Filled Count Used  # days KASSIE   Belbuca 150mg 7/25/20 60 22 38 17 2.2

## 2020-08-21 DIAGNOSIS — K31.84 GASTROPARESIS: ICD-10-CM

## 2020-08-23 RX ORDER — ONDANSETRON 4 MG/1
TABLET, FILM COATED ORAL
Qty: 60 TABLET | Refills: 0 | Status: SHIPPED | OUTPATIENT
Start: 2020-08-23 | End: 2021-01-25

## 2020-08-24 ENCOUNTER — OFFICE VISIT (OUTPATIENT)
Dept: PSYCHIATRY | Facility: CLINIC | Age: 60
End: 2020-08-24

## 2020-08-24 DIAGNOSIS — F32.A DEPRESSION, UNSPECIFIED DEPRESSION TYPE: Primary | ICD-10-CM

## 2020-08-24 PROCEDURE — 99442 PR PHYS/QHP TELEPHONE EVALUATION 11-20 MIN: CPT | Performed by: NURSE PRACTITIONER

## 2020-08-24 NOTE — PROGRESS NOTES
Subjective   Renée Laurent is a 59 y.o. female who is here today for medication management follow up. You have chosen to receive care through a telephone visit. Do you consent to use a telephone visit for your medical care today? Yes  TIME IN 11:27a  TIME OUT 11:44a    Chief Complaint: Depression unspecified    History of Present Illness Patient presents via telephone. She reports doing well with weaning down on fluoxetine. She is on 20mg daily , denies depression, denies w/d symptoms. Sleeping well, appetite good, denies anxiety issues.  Does have chronic pain which is addressed through other provider. Rates depression a 0  .  Denies adverse effects from medications.   (Scales based on 0 - 10 with 10 being the worst)    The following portions of the patient's history were reviewed and updated as appropriate: allergies, current medications, past family history, past medical history, past social history, past surgical history and problem list.    Review of Systems  A 14 point review of systems was performed and is negative except as noted above.    Objective   Physical Exam  There were no vitals taken for this visit.    Allergies   Allergen Reactions   • Aspirin Hives   • Phenergan [Promethazine Hcl]      Anxiety/relessness   • Reglan [Metoclopramide]      Involuntary movement       Current Medications:   Current Outpatient Medications   Medication Sig Dispense Refill   • ALPHA LIPOIC ACID PO Take 400 mg by mouth Daily.     • B Complex Vitamins (VITAMIN B COMPLEX PO) Take 1 tablet by mouth Daily.     • Biotin 89257 MCG tablet Take 1 tablet by mouth Daily.     • Buprenorphine HCl (Belbuca) film Apply 1 film to cheek 2 (two) times a day for 30 days. 60 each 0   • busPIRone (BUSPAR) 5 MG tablet TAKE ONE TABLET BY MOUTH TWICE A DAY 60 tablet 4   • coenzyme Q10 50 MG capsule capsule Take 50 mg by mouth Daily.     • dronabinol (MARINOL) 5 MG capsule TAKE ONE BY MOUTH TWICE A DAY BEFORE MEALS 60 capsule 0   •  FLUoxetine (PROzac) 20 MG capsule Take 1 capsule by mouth Daily. 30 capsule 0   • glucagon (GLUCAGON EMERGENCY) 1 MG injection Inject 1 mg into the appropriate muscle as directed by prescriber 1 (One) Time As Needed for Low Blood Sugar for up to 1 dose. 1 kit 11   • glucose blood test strip Used to test blood sugar 4 times daily for diabetes. E10.9 150 each 12   • insulin lispro (Admelog) 100 UNIT/ML injection Use in pump 0.45 units per hour basal dose with 2 unit per 30 g of carb bolus,with meals or snacks. Maximum 45 units per day E10.9 20 mL 3   • MAGNESIUM GLYCINATE PLUS PO Take 1 tablet by mouth Daily.     • Medium Chain Triglycerides (MCT OIL PO) Take 15 mL by mouth Daily.     • Multiple Vitamins-Minerals (AQUADEKS PO) Take  by mouth.     • OLANZapine (zyPREXA) 2.5 MG tablet Take 1 tablet by mouth Every Night for 360 days. 90 tablet 3   • ondansetron (ZOFRAN) 4 MG tablet TAKE ONE TABLET BY MOUTH EVERY 8 HOURS AS NEEDED FOR NAUSEA AND VOMITING 60 tablet 0   • pancrelipase, Lip-Prot-Amyl, (CREON) 97273-73698 units capsule delayed-release particles capsule Take 4-5 tablets tid with meals and 2-3 with snack 720 capsule 4   • pantoprazole (PROTONIX) 40 MG EC tablet Take 40 mg by mouth Daily.     • Probiotic Product (PROBIOTIC DAILY PO) Take  by mouth.     • sucralfate (Carafate) 1 g tablet Take 1 tablet by mouth 4 (Four) Times a Day. 90 tablet 3   • Unable to find Take 500 mg by mouth Daily. Bacopa     • Unable to find Take 1 each by mouth Daily. Med Name:Ashwaghanda     • Unable to find 1 each 1 (One) Time. Med Name: Triphala     • Unable to find Take 1 each by mouth Daily. Med Name: Calcium     • vitamin C (ASCORBIC ACID) 500 MG tablet Take 500 mg by mouth Daily.     • vitamin D (ERGOCALCIFEROL) 93121 UNITS capsule capsule TAKE ONE CAPSULE BY MOUTH EVERY 7 DAYS 4 capsule 0     No current facility-administered medications for this visit.          Appearance:  Hygiene:   good  Cooperation:  Cooperative  Eye  Contact:   Psychomotor Behavior: denies psychomotor agitation/retardation, No EPS, No motor tics  Mood:  within normal limits  Affect:   Hopelessness: Denies  Speech:  Normal  Thought Process:  Linear  Thought Content:  Normal  Concentration: Normal   Suicidal:  None  Homicidal:  None  Hallucinations:  None  Delusion:  None  Memory:  Intact  Orientation:  Person, Place, Time and Situation  Reliability:  good  Insight:  Fair  Judgement: good  Impulse Control: good  Estimated Intelligence: average range        Assessment/Plan   Diagnoses and all orders for this visit:    Depression, unspecified depression type      IMPRESSION: WNL on current med management, tolerating weaning off fluoxetine, currently on 20mg daily     PLAN:   Cont to wean off fluoxetine, is currently on 20 mg to begin every other day for a week then stop    We discussed risks, benefits, and side effects of the above medications and the patient was agreeable with the plan. Patient was educated on the importance of compliance with treatment and follow-up appointments.     Counseled patient regarding multimodal approach with encouragement of healthy nutrition, healthy sleep, regular physical mobility, social involvement, counseling, and medication compliance.     Assisted patient in identifying risk factors which would indicate the need for higher level of care including thoughts to harm self or others and/or self-harming behavior and encouraged patient to contact this office, call 911, or present to the nearest emergency room should any of these events occur. Discussed crisis intervention services and means to access.  Patient adamantly and convincingly denies current suicidal or homicidal ideation or perceptual disturbance.    Treatment Plan: stabilize mood, patient will stay out of psychiatric hospital and be at optimal level of functioning with therapy and take all medication as prescribed. Patient verbalized  understanding and agreement to  plan.    Instructed to call for questions or concerns and return early if necessary.     Return in about 4 weeks (around 9/21/2020).

## 2020-08-29 DIAGNOSIS — R63.0 DECREASE IN APPETITE: ICD-10-CM

## 2020-08-29 DIAGNOSIS — E10.43 DIABETIC GASTROPARESIS ASSOCIATED WITH TYPE 1 DIABETES MELLITUS (HCC): ICD-10-CM

## 2020-08-29 DIAGNOSIS — K31.84 DIABETIC GASTROPARESIS ASSOCIATED WITH TYPE 1 DIABETES MELLITUS (HCC): ICD-10-CM

## 2020-09-02 RX ORDER — DRONABINOL 5 MG/1
CAPSULE ORAL
Qty: 60 CAPSULE | Refills: 0 | OUTPATIENT
Start: 2020-09-02

## 2020-09-04 ENCOUNTER — OFFICE VISIT (OUTPATIENT)
Dept: INTERNAL MEDICINE | Facility: CLINIC | Age: 60
End: 2020-09-04

## 2020-09-04 DIAGNOSIS — K31.84 GASTROPARESIS DUE TO DM (HCC): ICD-10-CM

## 2020-09-04 DIAGNOSIS — R07.9 CHEST PAIN, UNSPECIFIED TYPE: Primary | ICD-10-CM

## 2020-09-04 DIAGNOSIS — E13.9 POST-PANCREATECTOMY DIABETES (HCC): ICD-10-CM

## 2020-09-04 DIAGNOSIS — R63.0 DECREASE IN APPETITE: ICD-10-CM

## 2020-09-04 DIAGNOSIS — R79.89 LOW SERUM PREALBUMIN: ICD-10-CM

## 2020-09-04 DIAGNOSIS — Z90.410 POST-PANCREATECTOMY DIABETES (HCC): ICD-10-CM

## 2020-09-04 DIAGNOSIS — E55.9 VITAMIN D DEFICIENCY: ICD-10-CM

## 2020-09-04 DIAGNOSIS — E78.2 MIXED HYPERLIPIDEMIA: ICD-10-CM

## 2020-09-04 DIAGNOSIS — Z01.84 IMMUNITY STATUS TESTING: ICD-10-CM

## 2020-09-04 DIAGNOSIS — E10.43 DIABETIC GASTROPARESIS ASSOCIATED WITH TYPE 1 DIABETES MELLITUS (HCC): ICD-10-CM

## 2020-09-04 DIAGNOSIS — K31.84 DIABETIC GASTROPARESIS ASSOCIATED WITH TYPE 1 DIABETES MELLITUS (HCC): ICD-10-CM

## 2020-09-04 DIAGNOSIS — E11.43 GASTROPARESIS DUE TO DM (HCC): ICD-10-CM

## 2020-09-04 DIAGNOSIS — D50.9 IRON DEFICIENCY ANEMIA, UNSPECIFIED IRON DEFICIENCY ANEMIA TYPE: ICD-10-CM

## 2020-09-04 DIAGNOSIS — E89.1 POST-PANCREATECTOMY DIABETES (HCC): ICD-10-CM

## 2020-09-04 DIAGNOSIS — R94.6 THYROID FUNCTION TEST ABNORMAL: ICD-10-CM

## 2020-09-04 DIAGNOSIS — M79.89 LEG SWELLING: ICD-10-CM

## 2020-09-04 PROCEDURE — 99443 PR PHYS/QHP TELEPHONE EVALUATION 21-30 MIN: CPT | Performed by: FAMILY MEDICINE

## 2020-09-04 RX ORDER — DRONABINOL 5 MG/1
CAPSULE ORAL
Qty: 60 CAPSULE | Refills: 0 | Status: CANCELLED | OUTPATIENT
Start: 2020-09-04

## 2020-09-04 RX ORDER — DRONABINOL 5 MG/1
5 CAPSULE ORAL
Qty: 60 CAPSULE | Refills: 2 | Status: SHIPPED | OUTPATIENT
Start: 2020-09-04 | End: 2021-01-12

## 2020-09-04 NOTE — PROGRESS NOTES
Subjective   Renée Laurent is a 59 y.o. female.     Chief Complaint   Patient presents with   • Follow-up     You have chosen to receive care through a telephone visit. Do you consent to use a telephone visit for your medical care today? Yes    Visit Vitals  LMP  (LMP Unknown)         History of Present Illness   Pt has been having swelling in her feet since June. Pt had chest pain in June and never went to to ER as directed.  Pt thought that she had GERD. Pt states that she thinks that she might have had Covid at the end of March and has not felt well since April.  Pt has been having more trouble with blood sugar and swelling since June. Pt has been staying home and socially isolating since March.  Pt has had low prealbumin in February.  Pt sent pictures with feet swelling scanned into chart.     The following portions of the patient's history were reviewed and updated as appropriate: allergies, current medications, past family history, past medical history, past social history, past surgical history and problem list.    Past Medical History:   Diagnosis Date   • Abdominal pain    • Absence of pancreas, acquired    • Allergic    • Anemia    • Anxiety    • Asthma    • Bile reflux gastritis    • Chronic illness    • Contact dermatitis     due to plants   • Depression    • Diabetes mellitus (CMS/HCC)    • Diabetes mellitus type I (CMS/HCC) 2011    Type 3c - surgically induced   • Eating disorder     Resolved   • Encounter for dental examination 07/2014   • Gastroparesis    • GERD (gastroesophageal reflux disease)    • History of medical problems 12/1/2011    Gastroparesis   • Hyperlipidemia    • Hypoglycemia 2011   • Incisional hernia    • Insomnia    • Iron deficiency    • Kidney stone     Left kidney   • Myeloid leukemia (CMS/HCC)    • Osteopenia    • Osteoporosis 2015    Osteopenia   • Pancreatitis    • Pneumonia 1/1/2017   • Post-splenectomy    • Vitamin D deficiency       Past Surgical History:    Procedure Laterality Date   • ABDOMINAL SURGERY     • CERVICAL DISCECTOMY ANTERIOR     • CHOLECYSTECTOMY     • COLONOSCOPY     • COSMETIC SURGERY      Rhinoplasty   • GALLBLADDER SURGERY     • GASTRIC STIMULATOR IMPLANT SURGERY  12/17/2018    [facility]   • HERNIA MESH REMOVAL  2014    abdominal hernia with mesh    • OTHER SURGICAL HISTORY      duodeum removed   • OTHER SURGICAL HISTORY      jejunem removed   • PANCREAS SURGERY      pancreas removal   • SMALL INTESTINE SURGERY  12/1/2011    [facility]   • STOMACH SURGERY      distal stomach    • TOTAL ABDOMINAL HYSTERECTOMY WITH SALPINGO OOPHORECTOMY     • TUBAL ABDOMINAL LIGATION     • UPPER GASTROINTESTINAL ENDOSCOPY  10/25/2017    Dr Mehta, gastritis, eosinphils in esophagus      Family History   Problem Relation Age of Onset   • Osteoporosis Mother    • Cancer Mother         CLL   • Hashimoto's thyroiditis Mother    • Arthritis Mother    • Depression Mother    • Thyroid disease Mother         Hashimoto’s   • Lung cancer Father    • Alcohol abuse Father    • Cancer Father         Lung   • Liver disease Father    • Asthma Sister       Social History     Socioeconomic History   • Marital status: Single     Spouse name: Not on file   • Number of children: Not on file   • Years of education: Not on file   • Highest education level: Not on file   Tobacco Use   • Smoking status: Never Smoker   • Smokeless tobacco: Never Used   Substance and Sexual Activity   • Alcohol use: No   • Drug use: No   • Sexual activity: Not Currently     Partners: Male     Birth control/protection: Post-menopausal      Allergies   Allergen Reactions   • Aspirin Hives   • Phenergan [Promethazine Hcl]      Anxiety/relessness   • Reglan [Metoclopramide]      Involuntary movement       Review of Systems   Constitutional: Positive for diaphoresis, fatigue and unexpected weight change. Negative for chills and fever.        Does not have a scale, but states that her clothes are tighter    HENT: Negative.  Negative for ear pain, nosebleeds, postnasal drip, rhinorrhea, sinus pressure, sneezing and sore throat.    Eyes: Negative.  Negative for redness and itching.   Respiratory: Positive for shortness of breath (since March). Negative for cough and wheezing.    Cardiovascular: Positive for chest pain (on and off. Not precipitated by anything) and leg swelling. Negative for palpitations.   Gastrointestinal: Positive for abdominal pain, anal bleeding, constipation, diarrhea, nausea and vomiting.   Endocrine: Positive for polyuria. Negative for cold intolerance, heat intolerance, polydipsia and polyphagia.        Blood sugar normally 87-88 recently 140.   Pt is up 4-5 times/ night urinating.    Genitourinary: Positive for frequency. Negative for dysuria, hematuria and urgency.   Musculoskeletal: Negative.  Negative for arthralgias, back pain, gait problem, joint swelling, myalgias, neck pain and neck stiffness.   Skin: Negative.  Negative for color change and rash.   Allergic/Immunologic: Negative.  Negative for environmental allergies.   Neurological: Negative.  Negative for dizziness, tremors, syncope, weakness, light-headedness and headaches.   Hematological: Negative.  Negative for adenopathy. Does not bruise/bleed easily.   Psychiatric/Behavioral: Positive for sleep disturbance. Negative for dysphoric mood. The patient is not nervous/anxious.        Objective   Physical Exam   Constitutional: She is oriented to person, place, and time.   Musculoskeletal: She exhibits edema (feet and ankles -email pictures).   Neurological: She is alert and oriented to person, place, and time.   Psychiatric: She has a normal mood and affect. Her behavior is normal. Thought content normal.      Telephone visit  See email pictures    Assessment/Plan   Renée was seen today for follow-up.    Diagnoses and all orders for this visit:    Chest pain, unspecified type  -     Ambulatory Referral to [facility], [place], operated by [facility]  [place]    Immunity status testing  -     SARS-CoV-2 Antibodies (Roche); Future    Low serum prealbumin  -     Prealbumin; Future    Mixed hyperlipidemia  -     Comprehensive Metabolic Panel; Future  -     Lipid Panel; Future    Gastroparesis due to DM (CMS/HCC)    Vitamin D deficiency  -     Vitamin D 25 Hydroxy; Future    Post-pancreatectomy diabetes (CMS/HCC)  -     Hemoglobin A1c; Future  -     Microalbumin / Creatinine Urine Ratio - Urine, Clean Catch; Future    Iron deficiency anemia, unspecified iron deficiency anemia type  -     CBC & Differential; Future  -     Iron Profile; Future    Thyroid function test abnormal  -     TSH; Future    Leg swelling    Decrease in appetite  -     dronabinol (MARINOL) 5 MG capsule; Take 1 capsule by mouth 2 (Two) Times a Day Before Meals.    Diabetic gastroparesis associated with type 1 diabetes mellitus (CMS/HCC)  -     dronabinol (MARINOL) 5 MG capsule; Take 1 capsule by mouth 2 (Two) Times a Day Before Meals.                   Current Outpatient Medications:   •  ALPHA LIPOIC ACID PO, Take 400 mg by mouth Daily., Disp: , Rfl:   •  B Complex Vitamins (VITAMIN B COMPLEX PO), Take 1 tablet by mouth Daily., Disp: , Rfl:   •  Biotin 97646 MCG tablet, Take 1 tablet by mouth Daily., Disp: , Rfl:   •  Buprenorphine HCl (Belbuca) film, Apply 1 film to cheek 2 (two) times a day for 30 days., Disp: 60 each, Rfl: 0  •  busPIRone (BUSPAR) 5 MG tablet, TAKE ONE TABLET BY MOUTH TWICE A DAY, Disp: 60 tablet, Rfl: 4  •  coenzyme Q10 50 MG capsule capsule, Take 50 mg by mouth Daily., Disp: , Rfl:   •  dronabinol (MARINOL) 5 MG capsule, Take 1 capsule by mouth 2 (Two) Times a Day Before Meals., Disp: 60 capsule, Rfl: 2  •  FLUoxetine (PROzac) 20 MG capsule, Take 1 capsule by mouth Daily., Disp: 30 capsule, Rfl: 0  •  glucagon (GLUCAGON EMERGENCY) 1 MG injection, Inject 1 mg into the appropriate muscle as directed by prescriber 1 (One) Time As Needed for Low Blood Sugar for up to 1  dose., Disp: 1 kit, Rfl: 11  •  glucose blood test strip, Used to test blood sugar 4 times daily for diabetes. E10.9, Disp: 150 each, Rfl: 12  •  insulin lispro (Admelog) 100 UNIT/ML injection, Use in pump 0.45 units per hour basal dose with 2 unit per 30 g of carb bolus,with meals or snacks. Maximum 45 units per day E10.9, Disp: 20 mL, Rfl: 3  •  MAGNESIUM GLYCINATE PLUS PO, Take 1 tablet by mouth Daily., Disp: , Rfl:   •  Medium Chain Triglycerides (MCT OIL PO), Take 15 mL by mouth Daily., Disp: , Rfl:   •  Multiple Vitamins-Minerals (AQUADEKS PO), Take  by mouth., Disp: , Rfl:   •  OLANZapine (zyPREXA) 2.5 MG tablet, Take 1 tablet by mouth Every Night for 360 days., Disp: 90 tablet, Rfl: 3  •  ondansetron (ZOFRAN) 4 MG tablet, TAKE ONE TABLET BY MOUTH EVERY 8 HOURS AS NEEDED FOR NAUSEA AND VOMITING, Disp: 60 tablet, Rfl: 0  •  pancrelipase, Lip-Prot-Amyl, (CREON) 38486-48011 units capsule delayed-release particles capsule, Take 4-5 tablets tid with meals and 2-3 with snack, Disp: 720 capsule, Rfl: 4  •  pantoprazole (PROTONIX) 40 MG EC tablet, Take 40 mg by mouth Daily., Disp: , Rfl:   •  Probiotic Product (PROBIOTIC DAILY PO), Take  by mouth., Disp: , Rfl:   •  sucralfate (Carafate) 1 g tablet, Take 1 tablet by mouth 4 (Four) Times a Day., Disp: 90 tablet, Rfl: 3  •  Unable to find, Take 500 mg by mouth Daily. Bacopa, Disp: , Rfl:   •  Unable to find, Take 1 each by mouth Daily. Med Name:Ashwaghanda, Disp: , Rfl:   •  Unable to find, 1 each 1 (One) Time. Med Name: Triphala, Disp: , Rfl:   •  Unable to find, Take 1 each by mouth Daily. Med Name: Calcium, Disp: , Rfl:   •  vitamin C (ASCORBIC ACID) 500 MG tablet, Take 500 mg by mouth Daily., Disp: , Rfl:   •  vitamin D (ERGOCALCIFEROL) 37764 UNITS capsule capsule, TAKE ONE CAPSULE BY MOUTH EVERY 7 DAYS, Disp: 4 capsule, Rfl: 0    Return if symptoms worsen or fail to improve, for Recheck after lab work.     This visit has been rescheduled as a phone visit to comply  with patient safety concerns in accordance with CDC recommendations. Total time of discussion was 23 minutes.

## 2020-09-15 ENCOUNTER — TELEMEDICINE (OUTPATIENT)
Dept: PALLIATIVE CARE | Facility: CLINIC | Age: 60
End: 2020-09-15

## 2020-09-15 DIAGNOSIS — Z51.81 THERAPEUTIC DRUG MONITORING: ICD-10-CM

## 2020-09-15 DIAGNOSIS — G89.29 CHRONIC ABDOMINAL PAIN: Primary | ICD-10-CM

## 2020-09-15 DIAGNOSIS — R10.9 CHRONIC ABDOMINAL PAIN: Primary | ICD-10-CM

## 2020-09-15 PROCEDURE — 99214 OFFICE O/P EST MOD 30 MIN: CPT | Performed by: INTERNAL MEDICINE

## 2020-09-15 RX ORDER — OLANZAPINE 2.5 MG/1
2.5 TABLET ORAL NIGHTLY
Qty: 90 TABLET | Refills: 0 | Status: SHIPPED | OUTPATIENT
Start: 2020-09-15 | End: 2020-12-14

## 2020-09-15 NOTE — PROGRESS NOTES
This provider note, based on phone call or other Telemedicine alternative, was created to either supplement or replace provision of in-person palliative care services by a provider (physician or nurse practitioner).  These services were not provided face-to-face due to the current recommendations of the CDC, restrictions implemented by the healthcare clinic or hospital which houses the clinic, or by request of the patient/family during the 2020 COVID-19 pandemic.    Patient verbally consented to this telehealth encounter.    Background:  PCP: Karla Torres MD  Referring Provider: Karla Torres MD  Reason for Referral: Depression, fatigue, gastroparesis     Renée is a 59yoF with h/o T1DM, gastroparesis s/p permanent gastric pacemaker placement on 12/17/2018. Also with h/o anxiety, depression.  She has had chronic abdominal pain since she was a teenager, with acute worsening in 2005 associated with significant weight loss. She was diagnosed with pancreatitis and in fact was found to have pancreatic divisum.  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  Her most troublesome symptom now is gastroparesis. She failed metoclopramide therapy due to development of Parkinsonian symptoms. She underwent placement of gastic pacemaker placement in 12/17/2018 though unfortunately has continued to have some gastroparesis. Pain: complains of pain due to chronic pancreatitis, nerve pain due to nerve damage.  Epigastric bloating pain and dull ache.  Impairs sleep - sleeps only 30 min at a time then up again due to pain.  Limits her activity outside of home. As of 2020, does not even leave home weekly.  Many missed medical appts due to intractable nausea +/- emesis and pain.  Has been on opioid therapy, Gabapentin, Pristiq, has undergone neurolytic interventions for abdominal pain.  Has concurrent chronic R arm radicular pain and cervical neck pain s/p  diskectomy/fusion/rhizotomy. However, the debilitating pain is of the abdomen with nausea.  See 2/20/20 initial consultation note for detailed history.     Noted that PCP is managing patient's issues re: thyroid and vitamins.     Care coordination messages:  [facility] will not manage pacemaker, so she will need to continue f/u with [facility] for pacemaker.  She was referred to [facility] for general GI f/u, has telemedicine visit scheduled 7/13/20.       Referred to Dr. Kumar for Interventional Pain options. Consideration of IT pump as has not had very effective response to neurolytic blocks in past.  However, FDA delay with [employer] pumps.      Interim history:  Rough couple of weeks re: chronic abdominal pain that radiates to back.  Notes increase in appetite with Marinol, so increase in eating which also stimulates appetite.      ANALGESIA:  Keeps at 5-6.  Pain 7-8 prior to dose  ADVERSE EFFECTS: Chronic constipation but responsive to OTC laxatives.  No sedation.  ACTIVITY:  independent  AFFECT:  No increase depression off Prozac (tapered off last week)  ABERRANT BEHAVIORS:  none    Has #2 films of Belbuca left (2 days early)    ECOG: (2) Ambulatory and capable of self care, unable to carry out work activity, up and about > 50% or waking hours    Physical Exam:  General:  Well [place], hair fixed, wearing earrings, NAD  HEENT:  Constant furrowed brow, EOMI, anicteric  PULM:  No cought, no wheeze, no breathlessness with conversation  Neuro:  Alert, no speech nor cognitive deficit, no tremor  Psych:  Normal affect.  Noted to be practicing mindful breathing and furrow brow frequently during conversation indicative of chronic pain, without moaning, nor pausing in conversation.      UDS:  THC, Screen, Urine   Date Value Ref Range Status   02/20/2020 Positive (A) Negative Final     Phencyclidine (PCP), Urine   Date Value Ref Range Status   02/20/2020 Negative Negative Final     Cocaine Screen, Urine   Date Value Ref Range Status    02/20/2020 Negative Negative Final     Methamphetamine, Ur   Date Value Ref Range Status   02/20/2020 Negative Negative Final     Opiate Screen   Date Value Ref Range Status   02/20/2020 Negative Negative Final     Amphetamine Screen, Urine   Date Value Ref Range Status   02/20/2020 Negative Negative Final     Benzodiazepine Screen, Urine   Date Value Ref Range Status   02/20/2020 Negative Negative Final     Tricyclic Antidepressants Screen   Date Value Ref Range Status   02/20/2020 Negative Negative Final     Methadone Screen, Urine   Date Value Ref Range Status   02/20/2020 Negative Negative Final     Barbiturates Screen, Urine   Date Value Ref Range Status   02/20/2020 Negative Negative Final     Oxycodone Screen, Urine   Date Value Ref Range Status   02/20/2020 Negative Negative Final     Propoxyphene Screen   Date Value Ref Range Status   02/20/2020 Negative Negative Final     Buprenorphine, Screen, Urine   Date Value Ref Range Status   02/20/2020 Negative Negative Final               Risk Assessment/Controlled medication tracking flowsheet reviewed.  See attached       Assessment/Plan   Diagnoses and all orders for this visit:    Chronic abdominal pain  -     Buprenorphine HCl 450 MCG film; Apply 1 film to cheek Every 12 (Twelve) Hours for 30 days.    Therapeutic drug monitoring  -     [facility] Nursing Communication 10    Other orders  -     OLANZapine (zyPREXA) 2.5 MG tablet; Take 1 tablet by mouth Every Night for 90 days.                   Plan:  1. Refill: Olanzapine 2.5mg nightly  2. Increase Belbuca from 300mcg to 450mcg dose for next month  3. I messaged Dr. Kumar's coordinator re: appt for SCS vs IT pump vs celiac block (effective in past, but short lived) as she has been cleared for intervention by Dr. Tarango  4. Pt advised to call our clinical line [phone] for any symptom or side effect concerns, new clinical needs, or for refill needs per usual clinic policies.    FOLLOW UP:  1  month    Encounter technology:  Video visit

## 2020-09-16 PROBLEM — R10.9 INTRACTABLE ABDOMINAL PAIN: Status: ACTIVE | Noted: 2020-09-16

## 2020-09-16 PROBLEM — Z87.19 HISTORY OF CHRONIC PANCREATITIS: Status: ACTIVE | Noted: 2020-09-16

## 2020-09-21 ENCOUNTER — TELEPHONE (OUTPATIENT)
Dept: ONCOLOGY | Facility: CLINIC | Age: 60
End: 2020-09-21

## 2020-09-21 ENCOUNTER — TELEPHONE (OUTPATIENT)
Dept: PALLIATIVE CARE | Facility: CLINIC | Age: 60
End: 2020-09-21

## 2020-09-21 DIAGNOSIS — Z51.81 THERAPEUTIC DRUG MONITORING: Primary | ICD-10-CM

## 2020-09-21 NOTE — TELEPHONE ENCOUNTER
Called pt to verify that she was aware of apt with Dr. Kumar tomorrow. Pt aware, has new pt paperwork, and is planning to attend. Asked pt to drop off urine sample at hospital when there. Pt stating she has to go to PCP for labs and asking if it can be done there.  Verified it could. Instructed pt to notify lab of need to submit urine as otherwise they would not complete it.  Pt v/u.

## 2020-09-22 NOTE — PROGRESS NOTES
"Chief Complaint: \"Pain in my abdomen and in my back.\"      History of Present Illness:   Patient: Ms. Renée Laurent, 59 y.o. female   Referring Physician: Dr. Jade Toro  Reason for Referral: Consultation for chronic intractable abdominal pain.   Pain History: Patient reports a several year history of abdominal pain, which began without incident. Patient was originally scheduled on September 22, 2020 but failed to show up to her appointment due to sickness. Patient has been referred by Dr. Jade Toro in consultation for intrathecal pump therapies and spinal cord stimulation therapies as conservative measures and previous interventions including celiac plexus blocks have been not effective in controlling her chronic pain. Patient has already already undergone psychological consultation with Dr. Maycol Govea and has been found to be an appropriate candidate for spinal cord stimulation and intrathecal therapies.  Patient has a history of chronic abdominal pain since she was a teenager. Her pain significant increase by 2005 associated with significant weight loss.  Eventually, patient was diagnosed with pancreatitis and was found to have pancreatic divisum. Patient underwent follow up consultation with Dr. Roderick Mehta on 07/13/2020.  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 S/p gastrojejunostomy in 2011.  History of poorly controlled diabetes.  Patient has developed chronic abdominal pain refractory to conservative measures.  She has been maximized on GI medications.  In addition, she is s/p gastric pacemaker in 2018 for treatment of gastroparesis by Dr. Cantrell. Patient also presents with significant comorbidities particularly a history of anxiety and depression. She has missed several medical appointments due to intractable nausea, vomiting, and severe abdominal pain. She has been on long-term opiate therapy. She has " been treated with adjuvant medications including gabapentin and Pristiq. She underwent neurolytic blocks with limited pain relief. She also complains of right upper extremity pain and neck pain.  She is a s/p cervical discectomy and fusion.  Pain has progressed in intensity over the past months. She also has a history of neck pain into the right arm s/p ACDF in 1990s  Pain Description: Constant pain with intermittent exacerbation, described as aching, burning, stabbing and throbbing sensation. Her nausea and vomiting have improved since implantation of a gastric pacemaker.   Radiation of Pain: The pain radiates from the abdomen into her lower back  Pain intensity today: 5/10  Average pain intensity last week: 6/10  Pain intensity ranges from: 3/10 to 8/10  Aggravating factors: activities, eating  Alleviating factors: Pain decreases with analgesics  Associated Symptoms:   Patient denies pain, numbness and weakness in the lower extremities.   Patient denies any new bladder or bowel problems.   Patient denies difficulties with her balance or recent falls    Review of previous therapies and additional medical records:  Renée Laurent has already failed the following measures, including:   Conservative Measures: Oral analgesics, opioids, topical analgesics, physical therapy   Interventional Measures: Celiac plexus blocks  Surgical Measures: Total pancreatectomy with auto islet cell transplantation due to chronic pancreatitis in 2011. S/p gastrojejunostomy in 2011.  S/p gastric pacemaker in 2018 for treatment of gastroparesis by Dr. Cantrell. She reports a total of 10 abdominal surgeries  Patient underwent psychological consultation with Dr. Cindi alfaro on July 22, 2020: From a psychological perspective, patient is considered to be an appropriate candidate for an implantable spinal cord stimulator device or an intrathecal pump device at this time  Renée Laurent presents with significant comorbidities  including anxiety, depression, diabetes, asthma, gastroparesis, s/p Medtronic gastric pacemaker, GERD, hyperlipidemia, insomnia, history of kidney stones, myeloid leukemia, osteopenia, history of multiple abdominal surgeries   In terms of current analgesics, Renée Laurent takes: Buprenorphine (Belbuca) 450 mcg twice daily. Patient also takes BuSpar, Marinol, fluoxetine, olanzapine, ondansetron  I have reviewed Mannie Report # [id number] consistent with medication reconciliation.      Global Pain Scale 09-22 2020          Pain 15          Feelings 7          Clinical outcomes 15          Activities 17          GPS Total: 54            Review of Diagnostic Studies:   Abdominal x-rays December 17, 2018: Gastric stimulator generator is located in the right lower quadrant.  2 leads extending continuously with electrode tips projecting over the left upper quadrant over the proximal gastric body.  Lower lumbar facet arthropathy    Review of Systems   Constitutional: Positive for activity change, fatigue and unexpected weight change.   HENT: Positive for congestion and dental problem.    Respiratory: Positive for shortness of breath.    Cardiovascular: Positive for leg swelling.   Gastrointestinal: Positive for abdominal distention, abdominal pain, constipation, diarrhea, nausea and vomiting.   Musculoskeletal: Positive for neck pain and neck stiffness.   Allergic/Immunologic: Positive for immunocompromised state.   Hematological: Bruises/bleeds easily.   Psychiatric/Behavioral: Positive for sleep disturbance.   All other systems reviewed and are negative.        Patient Active Problem List   Diagnosis   • Anxiety and depression   • Epigastric pain   • Post-pancreatectomy diabetes (CMS/HCC)   • Status post splenectomy   • Gastroparesis   • Vitamin D deficiency   • Gastroesophageal reflux disease with esophagitis   • Myeloid leukemia (CMS/HCC)   • Anxiety   • Type 1 diabetes mellitus without complications (CMS/HCC)    • Postsurgical malabsorption   • Post splenectomy syndrome   • Asthma   • Hyperlipidemia   • Iron deficiency anemia   • Thyroid function test abnormal   • Acquired total absence of pancreas   • Osteopenia   • Incisional hernia   • Contact dermatitis due to plants, except food   • Chronic abdominal pain   • Adjustment disorder with depressed mood   • Presence of gastric pacemaker   • Leukocytosis   • Pain medication agreement signed   • Gastroparesis due to DM (CMS/HCC)   • Intractable abdominal pain   • History of chronic pancreatitis       Past Medical History:   Diagnosis Date   • Abdominal pain    • Absence of pancreas, acquired    • Allergic    • Anemia    • Anxiety    • Asthma    • Bile reflux gastritis    • Chronic illness    • Contact dermatitis     due to plants   • Depression    • Diabetes mellitus (CMS/HCC)    • Diabetes mellitus type I (CMS/HCC) 2011    Type 3c - surgically induced   • Eating disorder     Resolved   • Encounter for dental examination 07/2014   • Gastroparesis    • GERD (gastroesophageal reflux disease)    • History of medical problems 12/1/2011    Gastroparesis   • Hyperlipidemia    • Hypoglycemia 2011   • Incisional hernia    • Insomnia    • Iron deficiency    • Kidney stone     Left kidney   • Myeloid leukemia (CMS/HCC)    • Osteopenia    • Osteoporosis 2015    Osteopenia   • Pancreatitis    • Pneumonia 1/1/2017   • Post-splenectomy    • Vitamin D deficiency          Past Surgical History:   Procedure Laterality Date   • ABDOMINAL SURGERY     • CERVICAL DISCECTOMY ANTERIOR     • CHOLECYSTECTOMY     • COLONOSCOPY     • COSMETIC SURGERY      Rhinoplasty   • GALLBLADDER SURGERY     • GASTRIC STIMULATOR IMPLANT SURGERY  12/17/2018    [facility]   • HERNIA MESH REMOVAL  2014    abdominal hernia with mesh    • OTHER SURGICAL HISTORY      duodeum removed   • OTHER SURGICAL HISTORY      jejunem removed   • PANCREAS SURGERY      pancreas removal   • SMALL INTESTINE SURGERY  12/1/2011     [facility]-ait   • STOMACH SURGERY      distal stomach    • TOTAL ABDOMINAL HYSTERECTOMY WITH SALPINGO OOPHORECTOMY     • TUBAL ABDOMINAL LIGATION     • UPPER GASTROINTESTINAL ENDOSCOPY  10/25/2017    Dr Mehta, gastritis, eosinphils in esophagus         Family History   Problem Relation Age of Onset   • Osteoporosis Mother    • Cancer Mother         CLL   • Hashimoto's thyroiditis Mother    • Arthritis Mother    • Depression Mother    • Thyroid disease Mother         Hashimoto’s   • Lung cancer Father    • Alcohol abuse Father    • Cancer Father         Lung   • Liver disease Father    • Asthma Sister          Social History     Socioeconomic History   • Marital status: Single     Spouse name: Not on file   • Number of children: Not on file   • Years of education: Not on file   • Highest education level: Not on file   Tobacco Use   • Smoking status: Never Smoker   • Smokeless tobacco: Never Used   Substance and Sexual Activity   • Alcohol use: No   • Drug use: No   • Sexual activity: Not Currently     Partners: Male     Birth control/protection: Post-menopausal           Current Outpatient Medications:   •  ALPHA LIPOIC ACID PO, Take 400 mg by mouth Daily., Disp: , Rfl:   •  B Complex Vitamins (VITAMIN B COMPLEX PO), Take 1 tablet by mouth Daily., Disp: , Rfl:   •  Biotin 59354 MCG tablet, Take 1 tablet by mouth Daily., Disp: , Rfl:   •  Buprenorphine HCl 450 MCG film, Apply 1 film to cheek Every 12 (Twelve) Hours for 30 days., Disp: 60 film, Rfl: 0  •  busPIRone (BUSPAR) 5 MG tablet, TAKE ONE TABLET BY MOUTH TWICE A DAY, Disp: 60 tablet, Rfl: 4  •  coenzyme Q10 50 MG capsule capsule, Take 50 mg by mouth Daily., Disp: , Rfl:   •  dronabinol (MARINOL) 5 MG capsule, Take 1 capsule by mouth 2 (Two) Times a Day Before Meals., Disp: 60 capsule, Rfl: 2  •  glucagon (GLUCAGON EMERGENCY) 1 MG injection, Inject 1 mg into the appropriate muscle as directed by prescriber 1 (One) Time As Needed for Low Blood Sugar for up to 1  dose., Disp: 1 kit, Rfl: 11  •  glucose blood test strip, Used to test blood sugar 4 times daily for diabetes. E10.9, Disp: 150 each, Rfl: 12  •  insulin lispro (Admelog) 100 UNIT/ML injection, Use in pump 0.45 units per hour basal dose with 2 unit per 30 g of carb bolus,with meals or snacks. Maximum 45 units per day E10.9, Disp: 20 mL, Rfl: 3  •  MAGNESIUM GLYCINATE PLUS PO, Take 1 tablet by mouth Daily., Disp: , Rfl:   •  Medium Chain Triglycerides (MCT OIL PO), Take 15 mL by mouth Daily., Disp: , Rfl:   •  Multiple Vitamins-Minerals (AQUADEKS PO), Take  by mouth., Disp: , Rfl:   •  OLANZapine (zyPREXA) 2.5 MG tablet, Take 1 tablet by mouth Every Night for 90 days., Disp: 90 tablet, Rfl: 0  •  ondansetron (ZOFRAN) 4 MG tablet, TAKE ONE TABLET BY MOUTH EVERY 8 HOURS AS NEEDED FOR NAUSEA AND VOMITING, Disp: 60 tablet, Rfl: 0  •  pancrelipase, Lip-Prot-Amyl, (CREON) 66155-82223 units capsule delayed-release particles capsule, Take 4-5 tablets tid with meals and 2-3 with snack, Disp: 720 capsule, Rfl: 4  •  pantoprazole (PROTONIX) 40 MG EC tablet, Take 40 mg by mouth Daily., Disp: , Rfl:   •  Probiotic Product (PROBIOTIC DAILY PO), Take  by mouth., Disp: , Rfl:   •  riFAXIMin (Xifaxan) 550 MG tablet, Take 550 mg by mouth., Disp: , Rfl:   •  sucralfate (Carafate) 1 g tablet, Take 1 tablet by mouth 4 (Four) Times a Day., Disp: 90 tablet, Rfl: 3  •  Unable to find, Take 500 mg by mouth Daily. Bacopa, Disp: , Rfl:   •  Unable to find, Take 1 each by mouth Daily. Med Name:Ashwaghanda, Disp: , Rfl:   •  Unable to find, 1 each 1 (One) Time. Med Name: Triphala, Disp: , Rfl:   •  Unable to find, Take 1 each by mouth Daily. Med Name: Calcium, Disp: , Rfl:   •  vitamin C (ASCORBIC ACID) 500 MG tablet, Take 500 mg by mouth Daily., Disp: , Rfl:   •  vitamin D (ERGOCALCIFEROL) 76413 UNITS capsule capsule, TAKE ONE CAPSULE BY MOUTH EVERY 7 DAYS, Disp: 4 capsule, Rfl: 0      Allergies   Allergen Reactions   • Aspirin Hives   •  "Phenergan [Promethazine Hcl]      Anxiety/relessness   • Reglan [Metoclopramide]      Involuntary movement         /70   Pulse 76   Temp 95.9 °F (35.5 °C)   Resp 12   Ht 165.1 cm (65\")   Wt 66.1 kg (145 lb 11.2 oz)   LMP  (LMP Unknown)   SpO2 96%   BMI 24.25 kg/m²       Physical Exam:  Constitutional: Patient appears well-developed and well-nourished.   HEENT: Head: Normocephalic and atraumatic.   Eyes: Conjunctivae and lids are normal.   Pupils: Equal, round, reactive to light.   Neck: Trachea normal. Neck supple. No JVD present.   Lymphatic: No cervical adenopathy  Pulmonary Respiratory effort: No increased work of breathing or signs of respiratory distress. Auscultation of lungs: Clear to auscultation.   Cardiovascular Auscultation of heart: Normal rate and rhythm, normal S1 and S2, no murmurs.   Abdomen: The abdomen was soft and nontender except in the right upper quadrant. Incisional hernia. Bowel sounds were normal.   Lymphatic: Right: No inguinal adenopathy present. Left: No inguinal adenopathy present.    Musculoskeletal   Gait and station: Gait evaluation demonstrated a normal gait   Lumbar spine: Passive and active range of motion are limited secondary to abdominal pain. Flexion of the lumbar spine increased and reproduced her abdominal pain. Lumbar facet joint loading maneuvers are negative  Jesus test and Gaenslen's test are negative   Hip joints: The range of motion of the hip joints is almost full and without pain   Neurological:   Patient is alert and oriented to person, place, and time.   Speech: speech is normal.   Cortical function: Normal mental status.   Cranial nerves: Cranial nerves 2-12 intact.   Reflex Scores:  Right brachioradialis: 2+  Left brachioradialis: 2+  Right biceps: 2+  Left biceps: 2+  Right triceps: 2+  Left triceps: 2+  Right patellar: 2+  Left patellar: 2+  Right Achilles: 2+  Left Achilles: 2+  Motor strength: 5/5  Motor Tone: normal tone.   Involuntary " movements: none.   Superficial/Primitive Reflexes: primitive reflexes were absent.   Right Cope: absent  Left Cope: absent  Right ankle clonus: absent  Left ankle clonus: absent   Babinsky: absent  Negative long tract signs. Straight leg raising test is negative. Femoral stretch sign is negative.   Sensation: No sensory loss. Sensory exam: intact to light touch, intact pain and temperature sensation, intact vibration sensation and normal proprioception.   Coordination: Normal finger to nose and heel to shin. Normal balance and negative Romberg's sign   Skin and subcutaneous tissue: Skin is warm and intact. No rash noted. No cyanosis.   Psychiatric: Judgment and insight: Normal. Orientation to person, place and time: Normal. Recent and remote memory: Intact. Mood and affect: Normal.     ASSESSMENT:   1. Preop testing    2. Intractable abdominal pain    3. History of chronic pancreatitis    4. Post-pancreatectomy diabetes (CMS/HCC)    5. Gastroparesis due to DM (CMS/HCC)    6. Presence of gastric pacemaker    7. Type 1 diabetes mellitus without complications (CMS/HCC)    8. Osteopenia of other site    9. Anxiety and depression         PLAN/MEDICAL DECISION MAKING:  Patient reports a several year history of unrelenting abdominal pain s/p 10 intra-abdominal surgeries. Patient has been referred by Dr. Jade Toro in consultation for intrathecal pump therapies and spinal cord stimulation therapies as conservative measures and previous interventions including celiac plexus blocks have been not effective in controlling her chronic pain. Patient has already already undergone psychological consultation with Dr. Maycol Govea and has been found to be an appropriate candidate for spinal cord stimulation and intrathecal therapies. Patient has a history of chronic abdominal pain since she was a teenager. Her pain significant increase by 2005 associated with significant weight loss.  Eventually, patient was diagnosed  with pancreatitis and was found to have pancreatic divisum. Patient underwent follow up consultation with Dr. Roderick Mehta on 07/13/2020.  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 S/p gastrojejunostomy in 2011. Patient has developed chronic abdominal and back pain refractory to conservative measures. She has been maximized on all GI medications. In addition, she is s/p gastric pacemaker in 2018 for treatment of gastroparesis by Dr. Cantrell. Patient also presents with significant comorbidities particularly a history of anxiety and depression. She has missed several medical appointments due to intractable nausea, vomiting, and severe abdominal pain. She has been on long-term opiate therapy. She has been treated with adjuvant medications including gabapentin and Pristiq. She underwent neurolytic blocks with limited pain relief. Patient has previously failed to obtain pain relief with conservative and interventional pain managemenet measures, as referenced under HPI. I have reviewed all available patient's medical records as well as previous therapies as referenced above. I had a lengthy conversation with Ms. Renée Kasey Laurent regarding her chronic pain condition and potential therapeutic options including risks, benefits, alternative therapies, to name a few. Therefore, I have proposed the following plan:  1. Diagnostic studies:  A. CT of the thoracic and lumbar spine without contrast prior to SCS trial (gastric pacemaker is not MRI compatible)  B. CBC, PT, PTT, COVID test   2. Pharmacological measures: Reviewed. Discussed. Patient takes Buprenorphine (Belbuca) 450 mcg twice daily,. Patient also takes BuSpar, Marinol, fluoxetine, olanzapine, ondansetron  3. Interventional pain management measures: Patient will be scheduled for a spinal cord stimulator trial with [employer]. Patient has received formal education from me including risks, benefits,  and alternative treatments, as well as detailed information regarding the procedure and specific goals for the trial. Patient has also received didactic materials including educational booklets and DVDs on spinal cord stimulation therapies. We have also discussed intrathecal therapies at length. Patient has received formal education from me including risks, benefits, and alternative treatments, as well as detailed information regarding the procedure and specific goals for the trial. Patient has also received didactic materials including educational booklets and DVDs on intrathecal therapies.  4. Long-term rehabilitation efforts:  A. The patient does not have a history of falls. I did complete a risk assessment for falls  B. Patient will start a comprehensive physical therapy program at [facility] with Dr. Frank Dobbins for reconditioning, therapeutic exercise, core strengthening, gait and balance training, neurodynamics, myofascial release, cupping and dry needling once pain is under control  C. Contrast therapy: Apply ice-packs for 15-20 minutes, followed by heating pads for 15-20 minutes to affected area   5. The patient has been instructed to contact my office with any questions or difficulties. The patient understands the plan and agrees to proceed accordingly.      I spent 70 minutes face-to-face with the patient, of which more than 50% of the time were spent counseling regarding evaluation, diagnosis, prognosis, diagnostic testing, potential referrals, treatment options for chronic pain condition and overall rehabilitation, coordination of care with other providers involved in patient's care, long-term management of concurrent comorbidities affecting effective pain control, risk and benefits of different interventions, alternative therapies, risks and benefits as it relates to spinal cord stimulator devices for trial and implantation, risks and benefits as it relates to intrathecal devices for the trial and  implantation, long-term management and functional goals of spinal cord stimulation and long-term management and functional goals of intrathecal therapy       Patient Care Team:  Karla Torres MD as PCP - General (Family Medicine)  Karla Torres MD as PCP - Claims Attributed  Kiko Mehta MD as Consulting Physician (Gastroenterology)  Isai Henry Jr., MD (Transplant Surgery)  Ray Clark MD (Endocrinology)  Clarissa Goldman APRN as Nurse Practitioner (Psychiatry)  Maycol Govea, PhD (Psychology)  Jade Toro MD as Consulting Physician (Hospice and Palliative Medicine)  Beba Solano, RN as Registered Nurse (Palliative Care)  Eliseo Kumar MD as Consulting Physician (Pain Medicine)     No orders of the defined types were placed in this encounter.        Future Appointments   Date Time Provider Department Center   10/13/2020  2:30 PM Jade Toro MD [facility] None   10/16/2020  2:30 PM [facility]   10/19/2020  1:00 PM Kiko Mehta MD [facility] None   11/17/2020  2:00 PM Kiko Mehta MD [facility] None         Eliseo Kumar MD     EMR Dragon/Transcription disclaimer:  Much of this encounter note is an electronic transcription of spoken language to printed text. Electronic transcription of spoken language may permit erroneous, or at times, nonsensical words or phrases to be inadvertently transcribed. Although I have reviewed the note for such errors, some may still exist.

## 2020-09-24 ENCOUNTER — LAB (OUTPATIENT)
Dept: LAB | Facility: HOSPITAL | Age: 60
End: 2020-09-24

## 2020-09-24 DIAGNOSIS — Z90.410 POST-PANCREATECTOMY DIABETES (HCC): ICD-10-CM

## 2020-09-24 DIAGNOSIS — L65.9 HAIR LOSS: ICD-10-CM

## 2020-09-24 DIAGNOSIS — K91.2 POSTSURGICAL MALABSORPTION: ICD-10-CM

## 2020-09-24 DIAGNOSIS — R94.6 THYROID FUNCTION TEST ABNORMAL: ICD-10-CM

## 2020-09-24 DIAGNOSIS — E78.2 MIXED HYPERLIPIDEMIA: ICD-10-CM

## 2020-09-24 DIAGNOSIS — Z51.81 THERAPEUTIC DRUG MONITORING: ICD-10-CM

## 2020-09-24 DIAGNOSIS — Z01.84 IMMUNITY STATUS TESTING: ICD-10-CM

## 2020-09-24 DIAGNOSIS — E55.9 VITAMIN D DEFICIENCY: ICD-10-CM

## 2020-09-24 DIAGNOSIS — R53.83 OTHER FATIGUE: ICD-10-CM

## 2020-09-24 DIAGNOSIS — K90.9 INTESTINAL MALABSORPTION, UNSPECIFIED TYPE: ICD-10-CM

## 2020-09-24 DIAGNOSIS — R79.89 LOW SERUM PREALBUMIN: ICD-10-CM

## 2020-09-24 DIAGNOSIS — E13.9 POST-PANCREATECTOMY DIABETES (HCC): ICD-10-CM

## 2020-09-24 DIAGNOSIS — E89.1 POST-PANCREATECTOMY DIABETES (HCC): ICD-10-CM

## 2020-09-24 DIAGNOSIS — D50.9 IRON DEFICIENCY ANEMIA, UNSPECIFIED IRON DEFICIENCY ANEMIA TYPE: ICD-10-CM

## 2020-09-24 LAB
ALBUMIN SERPL-MCNC: 4 G/DL (ref 3.5–5.2)
ALBUMIN UR-MCNC: <1.2 MG/DL
ALBUMIN/GLOB SERPL: 1 G/DL
ALP SERPL-CCNC: 213 U/L (ref 39–117)
ALT SERPL W P-5'-P-CCNC: 44 U/L (ref 1–33)
AMPHET+METHAMPHET UR QL: NEGATIVE
AMPHETAMINES UR QL: NEGATIVE
ANION GAP SERPL CALCULATED.3IONS-SCNC: 12.6 MMOL/L (ref 5–15)
AST SERPL-CCNC: 43 U/L (ref 1–32)
BARBITURATES UR QL SCN: NEGATIVE
BENZODIAZ UR QL SCN: NEGATIVE
BILIRUB SERPL-MCNC: 0.3 MG/DL (ref 0–1.2)
BUN SERPL-MCNC: 12 MG/DL (ref 8–23)
BUN/CREAT SERPL: 13.6 (ref 7–25)
BUPRENORPHINE SERPL-MCNC: POSITIVE NG/ML
CALCIUM SPEC-SCNC: 9.5 MG/DL (ref 8.6–10.5)
CANNABINOIDS SERPL QL: NEGATIVE
CHLORIDE SERPL-SCNC: 99 MMOL/L (ref 98–107)
CHOLEST SERPL-MCNC: 192 MG/DL (ref 0–200)
CO2 SERPL-SCNC: 24.4 MMOL/L (ref 22–29)
COCAINE UR QL: NEGATIVE
CREAT SERPL-MCNC: 0.88 MG/DL (ref 0.57–1)
CREAT UR-MCNC: 113.2 MG/DL
FERRITIN SERPL-MCNC: 24.2 NG/ML (ref 13–150)
GFR SERPL CREATININE-BSD FRML MDRD: 65 ML/MIN/1.73
GLOBULIN UR ELPH-MCNC: 4 GM/DL
GLUCOSE SERPL-MCNC: 122 MG/DL (ref 65–99)
HBA1C MFR BLD: 6.4 % (ref 4.8–5.6)
HDLC SERPL-MCNC: 48 MG/DL (ref 40–60)
IRON 24H UR-MRATE: 76 MCG/DL (ref 37–145)
IRON SATN MFR SERPL: 14 % (ref 20–50)
LDLC SERPL CALC-MCNC: 117 MG/DL (ref 0–100)
LDLC/HDLC SERPL: 2.43 {RATIO}
METHADONE UR QL SCN: NEGATIVE
MICROALBUMIN/CREAT UR: NORMAL MG/G{CREAT}
OPIATES UR QL: NEGATIVE
OXYCODONE UR QL SCN: NEGATIVE
PCP UR QL SCN: NEGATIVE
POTASSIUM SERPL-SCNC: 4.8 MMOL/L (ref 3.5–5.2)
PROPOXYPH UR QL: NEGATIVE
PROT SERPL-MCNC: 8 G/DL (ref 6–8.5)
SODIUM SERPL-SCNC: 136 MMOL/L (ref 136–145)
T3FREE SERPL-MCNC: 3.15 PG/ML (ref 2–4.4)
T4 FREE SERPL-MCNC: 1.05 NG/DL (ref 0.93–1.7)
TIBC SERPL-MCNC: 530 MCG/DL (ref 298–536)
TRANSFERRIN SERPL-MCNC: 356 MG/DL (ref 200–360)
TRICYCLICS UR QL SCN: NEGATIVE
TRIGL SERPL-MCNC: 136 MG/DL (ref 0–150)
TSH SERPL DL<=0.05 MIU/L-ACNC: 2.21 UIU/ML (ref 0.27–4.2)
VLDLC SERPL-MCNC: 27.2 MG/DL (ref 5–40)

## 2020-09-24 PROCEDURE — 82570 ASSAY OF URINE CREATININE: CPT

## 2020-09-24 PROCEDURE — 80061 LIPID PANEL: CPT

## 2020-09-24 PROCEDURE — 84134 ASSAY OF PREALBUMIN: CPT

## 2020-09-24 PROCEDURE — 84443 ASSAY THYROID STIM HORMONE: CPT

## 2020-09-24 PROCEDURE — 83036 HEMOGLOBIN GLYCOSYLATED A1C: CPT

## 2020-09-24 PROCEDURE — 86376 MICROSOMAL ANTIBODY EACH: CPT

## 2020-09-24 PROCEDURE — 86769 SARS-COV-2 COVID-19 ANTIBODY: CPT

## 2020-09-24 PROCEDURE — 80053 COMPREHEN METABOLIC PANEL: CPT

## 2020-09-24 PROCEDURE — 84466 ASSAY OF TRANSFERRIN: CPT

## 2020-09-24 PROCEDURE — 84439 ASSAY OF FREE THYROXINE: CPT

## 2020-09-24 PROCEDURE — 80306 DRUG TEST PRSMV INSTRMNT: CPT

## 2020-09-24 PROCEDURE — 84481 FREE ASSAY (FT-3): CPT

## 2020-09-24 PROCEDURE — 83540 ASSAY OF IRON: CPT

## 2020-09-24 PROCEDURE — 82306 VITAMIN D 25 HYDROXY: CPT

## 2020-09-24 PROCEDURE — 82728 ASSAY OF FERRITIN: CPT

## 2020-09-24 PROCEDURE — 82043 UR ALBUMIN QUANTITATIVE: CPT

## 2020-09-25 LAB
25(OH)D3 SERPL-MCNC: 17.7 NG/ML (ref 30–100)
PREALB SERPL-MCNC: 18 MG/DL (ref 20–40)
SARS-COV-2 AB SERPL QL IA: NEGATIVE
THYROPEROXIDASE AB SERPL-ACNC: <9 IU/ML (ref 0–34)

## 2020-09-29 ENCOUNTER — OFFICE VISIT (OUTPATIENT)
Dept: PAIN MEDICINE | Facility: CLINIC | Age: 60
End: 2020-09-29

## 2020-09-29 VITALS
RESPIRATION RATE: 12 BRPM | OXYGEN SATURATION: 96 % | HEIGHT: 65 IN | TEMPERATURE: 95.9 F | BODY MASS INDEX: 24.28 KG/M2 | DIASTOLIC BLOOD PRESSURE: 70 MMHG | HEART RATE: 76 BPM | SYSTOLIC BLOOD PRESSURE: 110 MMHG | WEIGHT: 145.7 LBS

## 2020-09-29 DIAGNOSIS — Z01.818 PREOP TESTING: Primary | ICD-10-CM

## 2020-09-29 DIAGNOSIS — M85.88 OSTEOPENIA OF OTHER SITE: ICD-10-CM

## 2020-09-29 DIAGNOSIS — Z96.89 PRESENCE OF GASTRIC PACEMAKER: ICD-10-CM

## 2020-09-29 DIAGNOSIS — F32.A ANXIETY AND DEPRESSION: ICD-10-CM

## 2020-09-29 DIAGNOSIS — E11.43 GASTROPARESIS DUE TO DM (HCC): ICD-10-CM

## 2020-09-29 DIAGNOSIS — E89.1 POST-PANCREATECTOMY DIABETES (HCC): ICD-10-CM

## 2020-09-29 DIAGNOSIS — R10.9 INTRACTABLE ABDOMINAL PAIN: Primary | ICD-10-CM

## 2020-09-29 DIAGNOSIS — E10.9 TYPE 1 DIABETES MELLITUS WITHOUT COMPLICATIONS (HCC): ICD-10-CM

## 2020-09-29 DIAGNOSIS — Z90.410 POST-PANCREATECTOMY DIABETES (HCC): ICD-10-CM

## 2020-09-29 DIAGNOSIS — K31.84 GASTROPARESIS DUE TO DM (HCC): ICD-10-CM

## 2020-09-29 DIAGNOSIS — R10.9 INTRACTABLE ABDOMINAL PAIN: ICD-10-CM

## 2020-09-29 DIAGNOSIS — F41.9 ANXIETY AND DEPRESSION: ICD-10-CM

## 2020-09-29 DIAGNOSIS — Z87.19 HISTORY OF CHRONIC PANCREATITIS: ICD-10-CM

## 2020-09-29 DIAGNOSIS — E13.9 POST-PANCREATECTOMY DIABETES (HCC): ICD-10-CM

## 2020-09-29 PROCEDURE — 99205 OFFICE O/P NEW HI 60 MIN: CPT | Performed by: ANESTHESIOLOGY

## 2020-10-09 ENCOUNTER — PATIENT MESSAGE (OUTPATIENT)
Dept: PALLIATIVE CARE | Facility: CLINIC | Age: 60
End: 2020-10-09

## 2020-10-09 DIAGNOSIS — R10.9 CHRONIC ABDOMINAL PAIN: ICD-10-CM

## 2020-10-09 DIAGNOSIS — G89.29 CHRONIC ABDOMINAL PAIN: ICD-10-CM

## 2020-10-09 NOTE — TELEPHONE ENCOUNTER
From: Renée Laurent  To: Jade Toro MD  Sent: 10/9/2020 2:34 PM EDT  Subject: Prescription Question    Dr. Toro/Shawna,    I had to move my appointment with you to 10/20 due to a scheduling conflict and I will be out of my [facility] films before then. Is it possible to have the prescription refilled before our next appointment so I’m not going any days without pain coverage? I currently have 14 films left. Thanks so much for your help with this.     Renée

## 2020-10-12 ENCOUNTER — APPOINTMENT (OUTPATIENT)
Dept: CT IMAGING | Facility: HOSPITAL | Age: 60
End: 2020-10-12

## 2020-10-15 ENCOUNTER — APPOINTMENT (OUTPATIENT)
Dept: CT IMAGING | Facility: HOSPITAL | Age: 60
End: 2020-10-15

## 2020-10-15 ENCOUNTER — TELEMEDICINE (OUTPATIENT)
Dept: PALLIATIVE CARE | Facility: CLINIC | Age: 60
End: 2020-10-15

## 2020-10-15 DIAGNOSIS — R63.0 DECREASE IN APPETITE: ICD-10-CM

## 2020-10-15 DIAGNOSIS — E10.43 DIABETIC GASTROPARESIS ASSOCIATED WITH TYPE 1 DIABETES MELLITUS (HCC): ICD-10-CM

## 2020-10-15 DIAGNOSIS — R10.9 CHRONIC ABDOMINAL PAIN: Primary | ICD-10-CM

## 2020-10-15 DIAGNOSIS — G89.29 CHRONIC ABDOMINAL PAIN: Primary | ICD-10-CM

## 2020-10-15 DIAGNOSIS — K31.84 DIABETIC GASTROPARESIS ASSOCIATED WITH TYPE 1 DIABETES MELLITUS (HCC): ICD-10-CM

## 2020-10-15 PROCEDURE — 99213 OFFICE O/P EST LOW 20 MIN: CPT | Performed by: INTERNAL MEDICINE

## 2020-10-15 NOTE — PROGRESS NOTES
This provider note, based on phone call or other Telemedicine alternative, was created to either supplement or replace provision of in-person palliative care services by a provider (physician or nurse practitioner).  These services were not provided face-to-face due to the current recommendations of the CDC, restrictions implemented by the healthcare clinic or hospital which houses the clinic, or by request of the patient/family during the 2020 COVID-19 pandemic.    Patient verbally consented to this telehealth encounter.    Background:  Patient Care Team:  Karla Torres MD as PCP - General (Family Medicine)  Karla Torres MD as PCP - Claims Attributed  Kiko Mehta MD as Consulting Physician (Gastroenterology)  Isai Henry Jr., MD (Transplant Surgery)  Ray Clark MD (Endocrinology)  Clarissa Goldman APRN as Nurse Practitioner (Psychiatry)  Maycol Govea, PhD (Psychology)  Jade Toro MD as Consulting Physician (Hospice and Palliative Medicine)  Beba Solano RN as Registered Nurse (Palliative Care)  Eliseo Kumar MD as Consulting Physician (Pain Medicine)    Subjective     History of Present Illness  Renée is a 59yoF with h/o T1DM, gastroparesis s/p permanent gastric pacemaker placement on 12/17/2018. Also with h/o anxiety, depression.  She has had chronic abdominal pain since she was a teenager, with acute worsening in 2005 associated with significant weight loss. She was diagnosed with pancreatitis and in fact was found to have pancreatic divisum.  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  Her most troublesome symptom now is gastroparesis. She failed metoclopramide therapy due to development of Parkinsonian symptoms. She underwent placement of gastic pacemaker placement in 12/17/2018 though unfortunately has continued to have some gastroparesis. Pain: complains of pain due to chronic  pancreatitis, nerve pain due to nerve damage.  Epigastric bloating pain and dull ache.  Impairs sleep - sleeps only 30 min at a time then up again due to pain.  Limits her activity outside of home. As of 2020, does not even leave home weekly.  Many missed medical appts due to intractable nausea +/- emesis and pain.  Has been on opioid therapy, Gabapentin, Pristiq, has undergone neurolytic interventions for abdominal pain.  Has concurrent chronic R arm radicular pain and cervical neck pain s/p diskectomy/fusion/rhizotomy. However, the debilitating pain is of the abdomen with nausea.  See 2/20/20 initial consultation note for detailed history.     Noted that PCP is managing patient's issues re: thyroid and vitamins.     Care coordination messages:  [facility] will not manage pacemaker, so she will need to continue f/u with [facility] for pacemaker.  She was referred to [facility] for general GI f/u, has telemedicine visit scheduled 7/13/20.       Referred to Dr. Kumar for Interventional Pain options. Consideration of IT pump as has not had very effective response to neurolytic blocks in past.  However, FDA delay with Medtronic pumps.      PSYCHOSOCIAL/SPIRITUAL:  Social History     Socioeconomic History   • Marital status: Single     Spouse name: Not on file   • Number of children: Not on file   • Years of education: Not on file   • Highest education level: Not on file   Tobacco Use   • Smoking status: Never Smoker   • Smokeless tobacco: Never Used   Substance and Sexual Activity   • Alcohol use: No   • Drug use: No   • Sexual activity: Not Currently     Partners: Male     Birth control/protection: Post-menopausal       INTERIM HISTORY:  Increase in Belbuca, allows her to manage normal routine and light cleaning (laundry and dishes and taking out trash with transient increase in pain).  However, if she tries walking, housekeeping like running vacuum, she is limited by the abdominal pain that radiates to back.      She plans for  SCS with Dr. Kumar.  She understands she might still need opioid therapy but goal is for lower dose.  Her goal is to no require any at all, but she is being realistic.    She has decreased dronabinol, does not take routinely, due to increase appetite and intake that then worsened abdominal pain.    ANALGESIA:  manageable  ADVERSE EFFECTS:  Increase in tiredness and drowsiness noted.  Constipation managed with managing pancreatic enzymes.  ACTIVITY:  Independent of ADLs and IADLs.  Did feel well enough this week to go for early voting.    AFFECT:  No depression, low anxiety  ABERRANT BEHAVIORS:  none    ESAS:  flowsheet reviewed.  Incrase in tiredness and drowsiness.  Otherwise improved symptoms       ECOG: (2) Ambulatory and capable of self care, unable to carry out work activity, up and about > 50% or waking hours    Physical Exam:  General:  Well kempt, hair pulled back, wearing earrings  HEENT:  EOMI  Skin:  No jaundice  Pulm:  No cough, no wheeze, no respiratory effort with conversation  Neuro:  Alert, normal cognition.  Stable purposeful speech with pauses (word finding vs thoughtfulness).  No tremor.  Psych:  Normal affect      UDS:  THC, Screen, Urine   Date Value Ref Range Status   09/24/2020 Negative Negative Final     Phencyclidine (PCP), Urine   Date Value Ref Range Status   09/24/2020 Negative Negative Final     Cocaine Screen, Urine   Date Value Ref Range Status   09/24/2020 Negative Negative Final     Methamphetamine, Ur   Date Value Ref Range Status   09/24/2020 Negative Negative Final     Opiate Screen   Date Value Ref Range Status   09/24/2020 Negative Negative Final     Amphetamine Screen, Urine   Date Value Ref Range Status   09/24/2020 Negative Negative Final     Benzodiazepine Screen, Urine   Date Value Ref Range Status   09/24/2020 Negative Negative Final     Tricyclic Antidepressants Screen   Date Value Ref Range Status   09/24/2020 Negative Negative Final     Methadone Screen, Urine   Date  Value Ref Range Status   09/24/2020 Negative Negative Final     Barbiturates Screen, Urine   Date Value Ref Range Status   09/24/2020 Negative Negative Final     Oxycodone Screen, Urine   Date Value Ref Range Status   09/24/2020 Negative Negative Final     Propoxyphene Screen   Date Value Ref Range Status   09/24/2020 Negative Negative Final     Buprenorphine, Screen, Urine   Date Value Ref Range Status   09/24/2020 Positive (A) Negative Final     Palliative Performance Scale  Palliative Performance Scale Score: 70%    [place] Symptom Assessment System Revised  Pain Score: 3   ESAS Tiredness Score: 8  ESAS Nausea Score: 5  ESAS Depression Score: No depression  ESAS Anxiety Score: 1  ESAS Drowsiness Score: 8  ESAS Lack of Appetite Score: 5  ESAS Wellbeing Score: 1  ESAS Dyspnea Score: 5  ESAS Source of Information: patient    YADI:  Reviewed.  No concerns.  Consistent with history.  Prescribers identified as members of care team.     Medication Counts:  Reviewed.  See RN note. took last dose Belbuca this morning (expected according to fill date)    CONTROLLED MEDICATION TRACKING FLOWSHEET:  CONTROLLED SUBSTANCE TRACKING 2/20/2020 5/28/2020 6/30/2020 10/15/2020   Last Yadi 2/19/2020 5/27/2020 6/29/2020 10/14/2020   Report Number [id number]   Last UDS - 2/20/2020 2/20/2020 9/24/2020   Last Controlled Substance Agreement - 2/20/2020 2/20/2020 2/20/2020   ORT Initial Risk Score 2 2 2 2   Prior UDT result - Expected Expected Expected   Pill count Did not bring Expected Expected Short   Diversion Concern No No No No   Disposal Education and Agreement [id number]       Assessment/Plan   Diagnoses and all orders for this visit:    1. Chronic abdominal pain (Primary)  -     Buprenorphine HCl 450 MCG film; Apply 1 film to cheek Every 12 (Twelve) Hours for 60 days.  Dispense: 60 film; Refill: 1    2. Decrease in appetite    3. Diabetic gastroparesis associated with type 1 diabetes  mellitus (CMS/HCC)             PLAN:  1. Refill: Belbuca 450mcg film q12h x 90 days (due to  previously e-scribed 30 day fill, sent also 30 days with 1 refill)  2. Pt advised to call our clinical line [phone] for any symptom or side effect concerns, new clinical needs, or for refill needs per usual clinic policies.    FOLLOW UP:  2 months    ENCOUNTER TIME:  20 min (video zoom)

## 2020-10-15 NOTE — PROGRESS NOTES
You have chosen to receive care through a telehealth visit.  Do you consent to use a video/audio connection for your medical care today? Yes    Med Counts  Medication Filled # Filled Count Used  # days KASSIE Cramer 450mcg 9/18/20 60 0 60 27 2.2

## 2020-10-16 ENCOUNTER — APPOINTMENT (OUTPATIENT)
Dept: PREADMISSION TESTING | Facility: HOSPITAL | Age: 60
End: 2020-10-16

## 2020-10-19 ENCOUNTER — TELEPHONE (OUTPATIENT)
Dept: PALLIATIVE CARE | Facility: CLINIC | Age: 60
End: 2020-10-19

## 2020-10-19 DIAGNOSIS — G89.29 CHRONIC ABDOMINAL PAIN: Primary | ICD-10-CM

## 2020-10-19 DIAGNOSIS — R10.9 CHRONIC ABDOMINAL PAIN: Primary | ICD-10-CM

## 2020-10-19 NOTE — TELEPHONE ENCOUNTER
Pt called asking if there is something that can be ordered for her pain while the appeal is being processed for the belbuca.  Pt has been without since Thursday.

## 2020-10-20 RX ORDER — HYDROCODONE BITARTRATE AND ACETAMINOPHEN 5; 325 MG/1; MG/1
1 TABLET ORAL EVERY 6 HOURS PRN
Qty: 12 TABLET | Refills: 0 | Status: SHIPPED | OUTPATIENT
Start: 2020-10-20 | End: 2020-10-23

## 2020-10-20 RX ORDER — BUPRENORPHINE 20 UG/H
1 PATCH TRANSDERMAL WEEKLY
Qty: 4 PATCH | Refills: 0 | Status: CANCELLED | OUTPATIENT
Start: 2020-10-20 | End: 2020-11-17

## 2020-10-20 NOTE — TELEPHONE ENCOUNTER
Pt without Belbuca due to PA delays.  Heather was with PA delay as well.  Will send in 3 day supply Lortab for time being.  Insurance causing considerable disruption in patient's effective and safe chronic pain management.  Oral opioid long term not ideal given her chronic nausea, abdominal pain, pancreatic insufficiency.

## 2020-10-22 ENCOUNTER — APPOINTMENT (OUTPATIENT)
Dept: CT IMAGING | Facility: HOSPITAL | Age: 60
End: 2020-10-22

## 2020-10-26 ENCOUNTER — PATIENT MESSAGE (OUTPATIENT)
Dept: PALLIATIVE CARE | Facility: CLINIC | Age: 60
End: 2020-10-26

## 2020-10-27 ENCOUNTER — APPOINTMENT (OUTPATIENT)
Dept: CT IMAGING | Facility: HOSPITAL | Age: 60
End: 2020-10-27

## 2020-10-29 ENCOUNTER — HOSPITAL ENCOUNTER (OUTPATIENT)
Dept: CT IMAGING | Facility: HOSPITAL | Age: 60
End: 2020-10-29

## 2020-11-06 ENCOUNTER — HOSPITAL ENCOUNTER (OUTPATIENT)
Dept: CT IMAGING | Facility: HOSPITAL | Age: 60
Discharge: HOME | End: 2020-11-06
Admitting: ANESTHESIOLOGY

## 2020-11-06 PROCEDURE — 72128 CT CHEST SPINE W/O DYE: CPT

## 2020-11-06 PROCEDURE — 72131 CT LUMBAR SPINE W/O DYE: CPT

## 2020-11-17 ENCOUNTER — TELEPHONE (OUTPATIENT)
Dept: INTERNAL MEDICINE | Facility: CLINIC | Age: 60
End: 2020-11-17

## 2020-11-17 DIAGNOSIS — E89.1 POST-PANCREATECTOMY DIABETES (HCC): ICD-10-CM

## 2020-11-17 DIAGNOSIS — Z90.410 POST-PANCREATECTOMY DIABETES (HCC): ICD-10-CM

## 2020-11-17 DIAGNOSIS — E13.9 POST-PANCREATECTOMY DIABETES (HCC): ICD-10-CM

## 2020-11-17 DIAGNOSIS — E10.9 TYPE 1 DIABETES MELLITUS WITHOUT COMPLICATIONS (HCC): Primary | ICD-10-CM

## 2020-11-17 RX ORDER — INSULIN PUMP CART,CONT INF,RF
CARTRIDGE (EA) SUBCUTANEOUS
Qty: 5 EACH | Refills: 3 | Status: SHIPPED | OUTPATIENT
Start: 2020-11-17 | End: 2020-11-18 | Stop reason: SDUPTHER

## 2020-11-17 NOTE — TELEPHONE ENCOUNTER
Pt needs her omnipod insulin pods for continous dose insulin  Pt is using 0.45 units per hour and 1unit per 30gm carb bolus with meals    Referral made to Endocrine

## 2020-11-18 ENCOUNTER — PATIENT MESSAGE (OUTPATIENT)
Dept: INTERNAL MEDICINE | Facility: CLINIC | Age: 60
End: 2020-11-18

## 2020-11-18 DIAGNOSIS — E89.1 POST-PANCREATECTOMY DIABETES (HCC): ICD-10-CM

## 2020-11-18 DIAGNOSIS — E10.9 TYPE 1 DIABETES MELLITUS WITHOUT COMPLICATIONS (HCC): ICD-10-CM

## 2020-11-18 DIAGNOSIS — Z90.410 POST-PANCREATECTOMY DIABETES (HCC): ICD-10-CM

## 2020-11-18 DIAGNOSIS — E13.9 POST-PANCREATECTOMY DIABETES (HCC): ICD-10-CM

## 2020-11-18 RX ORDER — INSULIN PUMP CART,CONT INF,RF
CARTRIDGE (EA) SUBCUTANEOUS
Qty: 30 EACH | Refills: 3 | Status: SHIPPED | OUTPATIENT
Start: 2020-11-18 | End: 2021-11-26

## 2020-11-18 NOTE — TELEPHONE ENCOUNTER
Cindi Crespo MA 11/18/2020 3:01 PM EST      ----- Message -----  From: Renée Laurent  Sent: 11/18/2020 2:58 PM EST  To: [facility] [address] Clinical Pool  Subject: Prescription Question     Rylee Callejas called yesterday regarding my Omnipod prescription. The way they received it, it was only for a 15-day supply (1 box of 5 changed every 3 days) and my copay on just 15 days is $70 (which means that with a normal 90-day supply the cost is about $400 out of pocket if I go with Rylee). Instead, can you call the script in to my [facility] home delivery account instead? It will save me about $150 for 90-days. Their phone number is [phone] and their website is [employer].    When you call in the script it should be for: 6 boxes total: each box has 5 pods in it and I change them every 3 days. I’m sorry you have to do it again, but I need to be mindful of my out of pocket cost. Thanks so much. Renée

## 2020-11-25 ENCOUNTER — PATIENT MESSAGE (OUTPATIENT)
Dept: PALLIATIVE CARE | Facility: CLINIC | Age: 60
End: 2020-11-25

## 2020-11-25 DIAGNOSIS — G89.29 CHRONIC ABDOMINAL PAIN: ICD-10-CM

## 2020-11-25 DIAGNOSIS — R10.9 CHRONIC ABDOMINAL PAIN: ICD-10-CM

## 2020-11-25 NOTE — TELEPHONE ENCOUNTER
From: Renée Laurent  To: Jade Toro MD  Sent: 11/25/2020 9:29 AM EST  Subject: Prescription Question    My last script of Buprenorphine wasn’t filled until 10/28/20 because of insurance issues. I’m due to use my last film as of 11/28 and would appreciate your calling in a new script so I can pick it up on the 28th. Thx.     Renée

## 2020-11-30 NOTE — TELEPHONE ENCOUNTER
Received message from [employer] that medication was denied. However, completed PA for it in October.  Called pt to see if she was able to  medication and she stated that she was able to get it and did not have any problems.

## 2020-12-09 RX ORDER — PANTOPRAZOLE SODIUM 40 MG/1
TABLET, DELAYED RELEASE ORAL
Qty: 90 TABLET | Refills: 0 | Status: SHIPPED | OUTPATIENT
Start: 2020-12-09 | End: 2021-03-10

## 2020-12-12 DIAGNOSIS — E89.1 POST-PANCREATECTOMY DIABETES (HCC): ICD-10-CM

## 2020-12-12 DIAGNOSIS — E13.9 POST-PANCREATECTOMY DIABETES (HCC): ICD-10-CM

## 2020-12-12 DIAGNOSIS — Z90.410 POST-PANCREATECTOMY DIABETES (HCC): ICD-10-CM

## 2020-12-12 DIAGNOSIS — Z90.410 ACQUIRED TOTAL ABSENCE OF PANCREAS: ICD-10-CM

## 2020-12-12 DIAGNOSIS — K91.2 POSTSURGICAL MALABSORPTION: ICD-10-CM

## 2020-12-12 RX ORDER — PANCRELIPASE 24000; 76000; 120000 [USP'U]/1; [USP'U]/1; [USP'U]/1
CAPSULE, DELAYED RELEASE PELLETS ORAL
Qty: 720 CAPSULE | Refills: 3 | Status: SHIPPED | OUTPATIENT
Start: 2020-12-12 | End: 2021-04-22 | Stop reason: SDUPTHER

## 2020-12-14 RX ORDER — OLANZAPINE 2.5 MG/1
2.5 TABLET ORAL NIGHTLY
Qty: 90 TABLET | Refills: 0 | Status: SHIPPED | OUTPATIENT
Start: 2020-12-14 | End: 2021-05-25

## 2020-12-15 ENCOUNTER — TELEMEDICINE (OUTPATIENT)
Dept: PALLIATIVE CARE | Facility: CLINIC | Age: 60
End: 2020-12-15

## 2020-12-15 DIAGNOSIS — R63.0 DECREASE IN APPETITE: Primary | ICD-10-CM

## 2020-12-15 PROCEDURE — 99213 OFFICE O/P EST LOW 20 MIN: CPT | Performed by: INTERNAL MEDICINE

## 2020-12-15 RX ORDER — DRONABINOL 5 MG/1
5 CAPSULE ORAL
Qty: 180 CAPSULE | Refills: 0 | Status: SHIPPED | OUTPATIENT
Start: 2020-12-15 | End: 2021-03-23

## 2020-12-15 NOTE — PROGRESS NOTES
This provider note, based on phone call or other Telemedicine alternative, was created to either supplement or replace provision of in-person palliative care services by a provider (physician or nurse practitioner).  These services were not provided face-to-face due to the current recommendations of the CDC, restrictions implemented by the healthcare clinic or hospital which houses the clinic, or by request of the patient/family during the 2020 COVID-19 pandemic.    Patient verbally consented to this telehealth encounter.    Background:  Patient Care Team:  Karla Torres MD as PCP - General (Family Medicine)  Kiko Mehta MD as Consulting Physician (Gastroenterology)  Isai Henry Jr., MD (Transplant Surgery)  Ray Clark MD (Endocrinology)  Clarissa Goldman APRN as Nurse Practitioner (Psychiatry)  Maycol Govea, PhD (Psychology)  Jade Toro MD as Consulting Physician (Hospice and Palliative Medicine)  Beba Solano RN as Registered Nurse (Palliative Care)  Eliseo Kumar MD as Consulting Physician (Pain Medicine)    Subjective     History of Present Illness  60yoF with h/o T1DM, gastroparesis s/p permanent gastric pacemaker placement on 12/17/2018. Also with h/o anxiety, depression.  She has had chronic abdominal pain since she was a teenager, with acute worsening in 2005 associated with significant weight loss. She was diagnosed with pancreatitis and in fact was found to have pancreatic divisum.  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  Her most troublesome symptom now is gastroparesis. She failed metoclopramide therapy due to development of Parkinsonian symptoms. She underwent placement of gastic pacemaker placement in 12/17/2018 though unfortunately has continued to have some gastroparesis. Pain: complains of pain due to chronic pancreatitis, nerve pain due to nerve damage.  Epigastric  bloating pain and dull ache.  Impairs sleep - sleeps only 30 min at a time then up again due to pain.  Limits her activity outside of home. As of 2020, does not even leave home weekly.  Many missed medical appts due to intractable nausea +/- emesis and pain.  Has been on opioid therapy, Gabapentin, Pristiq, has undergone neurolytic interventions for abdominal pain.  Has concurrent chronic R arm radicular pain and cervical neck pain s/p diskectomy/fusion/rhizotomy. However, the debilitating pain is of the abdomen with nausea.  See 2/20/20 initial consultation note for detailed history.     Noted that PCP is managing patient's issues re: thyroid and vitamins.     Care coordination messages:  [facility] will not manage pacemaker, so she will need to continue f/u with [facility] for pacemaker.  She was referred to [facility] for general GI f/u, has telemedicine visit scheduled 7/13/20.       Referred to Dr. Kumar for Interventional Pain options. Consideration of IT pump as has not had very effective response to neurolytic blocks in past.  However, FDA delay with Medtronic pumps.      PSYCHOSOCIAL/SPIRITUAL:  Social History     Socioeconomic History   • Marital status: Single     Spouse name: Not on file   • Number of children: Not on file   • Years of education: Not on file   • Highest education level: Not on file   Tobacco Use   • Smoking status: Never Smoker   • Smokeless tobacco: Never Used   Substance and Sexual Activity   • Alcohol use: No   • Drug use: No   • Sexual activity: Not Currently     Partners: Male     Birth control/protection: Post-menopausal         INTERIM HISTORY:  Pt had dental work yesterday with 3 dental implants and 2 teeth extractions on both sides of mouth.  No complications so far.    She had short lapses of Belbuca due to insurance.    Scheduled January for trial implant with Dr. Kumar.    Belbuca 450mcg BID    ANALGESIA:  Effective/manageable  ADVERSE EFFECTS:  No constipation - if she backs off  Creon she can manage constipation episodes  ACTIVITY:  Up and active with pain management, does feel like she could go out more if it weren't for pandemic.  She has actually applied for job that she can do from home  AFFECT:  Minimal to no depression nor anxiety  ABERRANT BEHAVIORS:  none    ESAS:  Palliative Performance Scale  Palliative Performance Scale Score: 80%   ESAS Tiredness Score: 6  ESAS Nausea Score: 5  ESAS Depression Score: 1  ESAS Anxiety Score: 1  ESAS Drowsiness Score: 2  ESAS Lack of Appetite Score: 1  ESAS Wellbeing Score: 2  ESAS Dyspnea Score: No shortness of breath  ESAS Source of Information: patient    ECOG: (1) Restricted in physically strenuous activity, ambulatory and able to do work of light nature    Physical Exam:  General:  Alert, NAD, walking around house   HEENT:  Holding ice pack to R cheek, face symmetric  PULM:  No cough, no wheeze, no breathlessness with conversation  Neuro:  Alert, normal speech, no cognitive deficit  Psych:  Normal affect, appropriate humor      UDS:  THC, Screen, Urine   Date Value Ref Range Status   09/24/2020 Negative Negative Final     Phencyclidine (PCP), Urine   Date Value Ref Range Status   09/24/2020 Negative Negative Final     Cocaine Screen, Urine   Date Value Ref Range Status   09/24/2020 Negative Negative Final     Methamphetamine, Ur   Date Value Ref Range Status   09/24/2020 Negative Negative Final     Opiate Screen   Date Value Ref Range Status   09/24/2020 Negative Negative Final     Amphetamine Screen, Urine   Date Value Ref Range Status   09/24/2020 Negative Negative Final     Benzodiazepine Screen, Urine   Date Value Ref Range Status   09/24/2020 Negative Negative Final     Tricyclic Antidepressants Screen   Date Value Ref Range Status   09/24/2020 Negative Negative Final     Methadone Screen, Urine   Date Value Ref Range Status   09/24/2020 Negative Negative Final     Barbiturates Screen, Urine   Date Value Ref Range Status   09/24/2020  Negative Negative Final     Oxycodone Screen, Urine   Date Value Ref Range Status   09/24/2020 Negative Negative Final     Propoxyphene Screen   Date Value Ref Range Status   09/24/2020 Negative Negative Final     Buprenorphine, Screen, Urine   Date Value Ref Range Status   09/24/2020 Positive (A) Negative Final     Palliative Performance Scale  Palliative Performance Scale Score: 80%    [place] Symptom Assessment System Revised  Pain Score: 3   ESAS Tiredness Score: 6  ESAS Nausea Score: 5  ESAS Depression Score: 1  ESAS Anxiety Score: 1  ESAS Drowsiness Score: 2  ESAS Lack of Appetite Score: 1  ESAS Wellbeing Score: 2  ESAS Dyspnea Score: No shortness of breath  ESAS Source of Information: patient    YADI:  Reviewed.  No concerns.  Consistent with history.  Prescribers identified as members of care team.     CONTROLLED MEDICATION TRACKING FLOWSHEET:  CONTROLLED SUBSTANCE TRACKING 2/20/2020 5/28/2020 6/30/2020 10/15/2020   Last Yadi 2/19/2020 5/27/2020 6/29/2020 10/14/2020   Report Number [id number]   Last UDS - 2/20/2020 2/20/2020 9/24/2020   Last Controlled Substance Agreement - 2/20/2020 2/20/2020 2/20/2020   ORT Initial Risk Score 2 2 2 2   Prior UDT result - Expected Expected Expected   Pill count Did not bring Expected Expected Short   Diversion Concern No No No No   Disposal Education and Agreement [id number]       Assessment/Plan   Diagnoses and all orders for this visit:    1. Decrease in appetite (Primary)  -     dronabinol (MARINOL) 5 MG capsule; Take 1 capsule by mouth 2 (Two) Times a Day Before Meals for 90 days.  Dispense: 180 capsule; Refill: 0           PLAN:  1. Refill: Dronabinol 90 day supply  2. Pt has Belbuca refill to last her until approximately 1/24/21.  Her goal is to be off this completely after IT pump.    3. Given current state of pandemic, risk of elective procedures such as IT pump trial and implantation being delayed.  Current plan is for  this trial and implantation to occur in January.  4. Pt advised to call our clinical line [phone] for any symptom or side effect concerns, new clinical needs, or for refill needs per usual clinic policies.    FOLLOW UP:  1 month    ENCOUNTER TIME:  15 min

## 2020-12-19 DIAGNOSIS — F41.9 ANXIETY: ICD-10-CM

## 2020-12-21 RX ORDER — BUSPIRONE HYDROCHLORIDE 5 MG/1
TABLET ORAL
Qty: 60 TABLET | Refills: 2 | Status: SHIPPED | OUTPATIENT
Start: 2020-12-21 | End: 2021-03-23

## 2020-12-21 NOTE — TELEPHONE ENCOUNTER
Last Office Visit: 09/04/2020  Next Office Visit:  None scheduled     Labs completed in past 6 months? yes  Labs completed in past year? yes     Last Refill Date: 06/22/2020  Quantity: 60  Refills: 4    Pharmacy:  [facility] [address] [place] [address] [address] AT [facility] [address] - [phone]  - [phone] FX

## 2021-01-07 ENCOUNTER — TELEPHONE (OUTPATIENT)
Dept: PAIN MEDICINE | Facility: CLINIC | Age: 61
End: 2021-01-07

## 2021-01-07 NOTE — TELEPHONE ENCOUNTER
Called patient to let her know that her COVID test time had changed from 11:15 am to 10:45 am and patient is aware and stated that she seen it on the patient portal.

## 2021-01-08 ENCOUNTER — APPOINTMENT (OUTPATIENT)
Dept: PREADMISSION TESTING | Facility: HOSPITAL | Age: 61
End: 2021-01-08

## 2021-01-12 ENCOUNTER — TELEMEDICINE (OUTPATIENT)
Dept: PALLIATIVE CARE | Facility: CLINIC | Age: 61
End: 2021-01-12

## 2021-01-12 DIAGNOSIS — R10.9 CHRONIC ABDOMINAL PAIN: Primary | ICD-10-CM

## 2021-01-12 DIAGNOSIS — G89.29 CHRONIC ABDOMINAL PAIN: Primary | ICD-10-CM

## 2021-01-12 PROCEDURE — 99214 OFFICE O/P EST MOD 30 MIN: CPT | Performed by: INTERNAL MEDICINE

## 2021-01-12 RX ORDER — BUPRENORPHINE HYDROCHLORIDE 450 UG/1
1 FILM, SOLUBLE BUCCAL 2 TIMES DAILY
Qty: 60 FILM | Refills: 0 | Status: SHIPPED | OUTPATIENT
Start: 2021-02-23 | End: 2021-03-22 | Stop reason: SDUPTHER

## 2021-01-12 NOTE — PROGRESS NOTES
You have chosen to receive care through a telehealth visit.  Do you consent to use a video/audio connection for your medical care today? Yes    Med Counts  Medication Filled # Filled Count Used  # days KASSIE   Belbuca 450 12/29/20 60 34 26 14 1.9   Marinol 5 12/29/250 60 34 26 14 1.9

## 2021-01-12 NOTE — PROGRESS NOTES
This provider note, based on phone call or other Telemedicine alternative, was created to either supplement or replace provision of in-person palliative care services by a provider (physician or nurse practitioner).  These services were not provided face-to-face due to the current recommendations of the CDC, restrictions implemented by the healthcare clinic or hospital which houses the clinic, or by request of the patient/family during the 2020 COVID-19 pandemic.    Patient verbally consented to this telehealth encounter.    Background:  Patient Care Team:  Karla Torres MD as PCP - General (Family Medicine)  Kiko Mehta MD as Consulting Physician (Gastroenterology)  Isai Henry Jr., MD (Transplant Surgery)  Ray Clark MD (Endocrinology)  Clarissa Goldman APRN as Nurse Practitioner (Psychiatry)  Maycol Govea, PhD (Psychology)  Jade Toro MD as Consulting Physician (Hospice and Palliative Medicine)  Eliseo Kumar MD as Consulting Physician (Pain Medicine)    Subjective     History of Present Illness  60yoF with h/o T1DM, gastroparesis s/p permanent gastric pacemaker placement on 12/17/2018. Also with h/o anxiety, depression.  She has had chronic abdominal pain since she was a teenager, with acute worsening in 2005 associated with significant weight loss. She was diagnosed with pancreatitis and in fact was found to have pancreatic divisum.  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  Her most troublesome symptom now is gastroparesis. She failed metoclopramide therapy due to development of Parkinsonian symptoms. She underwent placement of gastic pacemaker placement in 12/17/2018 though unfortunately has continued to have some gastroparesis. Pain: complains of pain due to chronic pancreatitis, nerve pain due to nerve damage.  Epigastric bloating pain and dull ache.  Impairs sleep - sleeps only 30 min at  a time then up again due to pain.  Limits her activity outside of home. As of 2020, does not even leave home weekly.  Many missed medical appts due to intractable nausea +/- emesis and pain.  Has been on opioid therapy, Gabapentin, Pristiq, has undergone neurolytic interventions for abdominal pain.  Has concurrent chronic R arm radicular pain and cervical neck pain s/p diskectomy/fusion/rhizotomy. However, the debilitating pain is of the abdomen with nausea.  See 2/20/20 initial consultation note for detailed history.     Noted that PCP is managing patient's issues re: thyroid and vitamins.     Care coordination messages:  [facility] will not manage pacemaker, so she will need to continue f/u with [facility] for pacemaker.  She was referred to [facility] for general GI f/u, had telemedicine visit scheduled 7/13/20 but pt called.       Referred to Dr. Kumar for Interventional Pain options. Consideration of IT pump as has not had very effective response to neurolytic blocks in past.      PSYCHOSOCIAL/SPIRITUAL:  Social History     Socioeconomic History   • Marital status: Single     Spouse name: Not on file   • Number of children: Not on file   • Years of education: Not on file   • Highest education level: Not on file   Tobacco Use   • Smoking status: Never Smoker   • Smokeless tobacco: Never Used   Substance and Sexual Activity   • Alcohol use: No   • Drug use: No   • Sexual activity: Not Currently     Partners: Male     Birth control/protection: Post-menopausal         INTERIM HISTORY:  1 month f/u.    She is scheduled with Dr. Kumar 2/1/21.  Coordinating with pre-procedure COVID testing for SCS trial.    Belbuca 450mcg BID  Dronabinol 5mg BID PRN - does not take maximum 2/2 too much hunger and intake and then increase post-prandial pain.      She is most recently 136#.      ANALGESIA:  managed  ADVERSE EFFECTS:  No constipation, no sedation  ACTIVITY:  She feels that she could be up to leave the home 4-5 days per week with  this current level of pain and nausea.  However, she does not feel that she could leave the home for traditional scheduled work hours due to nausea.  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  She has also started home dancing exercise program  AFFECT:  Low/no anxiety nor depression  ABERRANT BEHAVIORS:  None.  Counts do not reflect overuse.  PDMP without concerns.  Pt keeping multi-specialty appts.    ESAS:  Palliative Performance Scale  Palliative Performance Scale Score: 70%   ESAS Tiredness Score: 3  ESAS Nausea Score: 4  ESAS Depression Score: 1  ESAS Anxiety Score: 1  ESAS Drowsiness Score: 3  ESAS Lack of Appetite Score: 8  ESAS Wellbeing Score: 1  ESAS Dyspnea Score: 1  ESAS Source of Information: patient    ECOG: (2) Ambulatory and capable of self care, unable to carry out work activity, up and about > 50% or waking hours    Physical Exam:  General:  Alert, well kempt wearing jewelry, NAD  HEENT:  Anicteric, face symmetric, hair pulled back and fixed  PULM:  Normal and nonlabored, not breathless with conversation  Neuro:  Alert, normal speech, no cognitive deficit  Psych:  Smiling, normal affect  Skin:  No jaundice, appears dry and without rash of face      UDS:  THC, Screen, Urine   Date Value Ref Range Status   09/24/2020 Negative Negative Final     Phencyclidine (PCP), Urine   Date Value Ref Range Status   09/24/2020 Negative Negative Final     Cocaine Screen, Urine   Date Value Ref Range Status   09/24/2020 Negative Negative Final     Methamphetamine, Ur   Date Value Ref Range Status   09/24/2020 Negative Negative Final     Opiate Screen   Date Value Ref Range Status   09/24/2020 Negative Negative Final     Amphetamine Screen, Urine   Date Value Ref Range Status   09/24/2020 Negative Negative Final     Benzodiazepine Screen, Urine   Date Value Ref Range Status   09/24/2020 Negative Negative Final     Tricyclic Antidepressants Screen   Date Value Ref  Range Status   09/24/2020 Negative Negative Final     Methadone Screen, Urine   Date Value Ref Range Status   09/24/2020 Negative Negative Final     Barbiturates Screen, Urine   Date Value Ref Range Status   09/24/2020 Negative Negative Final     Oxycodone Screen, Urine   Date Value Ref Range Status   09/24/2020 Negative Negative Final     Propoxyphene Screen   Date Value Ref Range Status   09/24/2020 Negative Negative Final     Buprenorphine, Screen, Urine   Date Value Ref Range Status   09/24/2020 Positive (A) Negative Final     Palliative Performance Scale  Palliative Performance Scale Score: 70%    [place] Symptom Assessment System Revised  Pain Score: 3   ESAS Tiredness Score: 3  ESAS Nausea Score: 4  ESAS Depression Score: 1  ESAS Anxiety Score: 1  ESAS Drowsiness Score: 3  ESAS Lack of Appetite Score: 8  ESAS Wellbeing Score: 1  ESAS Dyspnea Score: 1  ESAS Source of Information: patient    YADI:  Reviewed.  No concerns.  Consistent with history.  Prescribers identified as members of care team.     Medication Counts:  Reviewed.  See RN note. self-reported.  No overuse.  No missed doses    CONTROLLED MEDICATION TRACKING FLOWSHEET:  CONTROLLED SUBSTANCE TRACKING 2/20/2020 5/28/2020 6/30/2020 10/15/2020 1/12/2021   Last Yadi 2/19/2020 5/27/2020 6/29/2020 10/14/2020 1/11/2021   Report Number [id number]   Last UDS - 2/20/2020 2/20/2020 9/24/2020 9/24/2020   Last Controlled Substance Agreement - 2/20/2020 2/20/2020 2/20/2020 2/20/2020   ORT Initial Risk Score 2 2 2 2 2   Prior UDT result - Expected Expected Expected Expected   Pill count Did not bring Expected Expected Short Expected   Diversion Concern No No No No No   Disposal Education and Agreement [id number]       Assessment/Plan   Diagnoses and all orders for this visit:    1. Chronic abdominal pain (Primary)  -     Buprenorphine HCl 450 MCG film; Apply 1 film to cheek 2 (two) times a day for 30 days.   Dispense: 60 film; Refill: 0  -     Buprenorphine HCl (Belbuca) 450 MCG film; Apply 1 film to cheek 2 (two) times a day for 30 days.  Dispense: 60 film; Refill: 0             PLAN:  1. Refill: Belbuca 450mcg BID x 60 days e-scribed today.  2. Discussed she can self taper by 50% every 3 days, and cut her films in half to see the effect of the SCS around time of trial.  We considered e-scribing 150mcg films to assist with taper trial.  However, she has considerable insurance barriers, that we decided against this.  Will plan for future planned slow taper after SCS.  She may try dosing 450mcg once daily or cut films in half for 225mcg BID after SCS.  3. Pt advised to call our clinical line [phone] for any symptom or side effect concerns, new clinical needs, or for refill needs per usual clinic policies.    FOLLOW UP:  3 months, Video Visit to decrease exposure risk during pandemic.    ENCOUNTER TIME:  20 min  MEDICAL COMPLEXITY:   Level 4  High risk prescription medication requiring monitoring for adverse effects

## 2021-01-24 DIAGNOSIS — E10.9 DIABETES MELLITUS TYPE 1, CONTROLLED, INSULIN DEPENDENT (HCC): ICD-10-CM

## 2021-01-24 DIAGNOSIS — K31.84 GASTROPARESIS: ICD-10-CM

## 2021-01-25 ENCOUNTER — PATIENT MESSAGE (OUTPATIENT)
Dept: INTERNAL MEDICINE | Facility: CLINIC | Age: 61
End: 2021-01-25

## 2021-01-25 RX ORDER — ONDANSETRON 4 MG/1
TABLET, FILM COATED ORAL
Qty: 60 TABLET | Refills: 0 | Status: SHIPPED | OUTPATIENT
Start: 2021-01-25 | End: 2021-03-23

## 2021-01-27 RX ORDER — TRETINOIN 0.5 MG/G
CREAM TOPICAL NIGHTLY
Qty: 20 G | Refills: 2 | Status: SHIPPED | OUTPATIENT
Start: 2021-01-27 | End: 2023-01-16

## 2021-01-27 NOTE — TELEPHONE ENCOUNTER
Sil Melednez MA 1/25/2021 5:07 PM EST      ----- Message -----  From: Renée Laurent  Sent: 1/25/2021 10:57 AM EST  To: [facility] [address] Clinical Pool  Subject: Non-Urgent Medical Question     Dr. Torres,    Would you be able to write/submit a prescription for .05% tretinoin cream (Retin-A)? I was hoping not to have to incur the cost of seeing a dermatologist at this point, not to mention trying to limit my exposure. Thanks so much.     Renée

## 2021-01-29 ENCOUNTER — APPOINTMENT (OUTPATIENT)
Dept: PREADMISSION TESTING | Facility: HOSPITAL | Age: 61
End: 2021-01-29

## 2021-01-29 PROCEDURE — C9803 HOPD COVID-19 SPEC COLLECT: HCPCS

## 2021-01-29 PROCEDURE — U0004 COV-19 TEST NON-CDC HGH THRU: HCPCS

## 2021-01-30 LAB — SARS-COV-2 RNA RESP QL NAA+PROBE: NOT DETECTED

## 2021-02-01 ENCOUNTER — TELEPHONE (OUTPATIENT)
Dept: ORTHOPEDICS | Facility: OTHER | Age: 61
End: 2021-02-01

## 2021-02-01 ENCOUNTER — OUTSIDE FACILITY SERVICE (OUTPATIENT)
Dept: PAIN MEDICINE | Facility: CLINIC | Age: 61
End: 2021-02-01

## 2021-02-01 PROBLEM — Z46.2 ENCOUNTER FOR FITTING AND ADJUSTMENT OF NEUROPACEMAKER OF SPINAL CORD: Status: ACTIVE | Noted: 2021-02-01

## 2021-02-01 PROCEDURE — 63650 IMPLANT NEUROELECTRODES: CPT | Performed by: ANESTHESIOLOGY

## 2021-02-01 PROCEDURE — 99152 MOD SED SAME PHYS/QHP 5/>YRS: CPT | Performed by: ANESTHESIOLOGY

## 2021-02-01 RX ORDER — HYDROXYZINE HYDROCHLORIDE 25 MG/1
TABLET, FILM COATED ORAL
Qty: 12 TABLET | Refills: 0 | Status: SHIPPED | OUTPATIENT
Start: 2021-02-01 | End: 2021-05-07

## 2021-02-01 RX ORDER — TIZANIDINE 2 MG/1
TABLET ORAL
Qty: 21 TABLET | Refills: 0 | Status: SHIPPED | OUTPATIENT
Start: 2021-02-01 | End: 2021-05-07

## 2021-02-01 NOTE — PROGRESS NOTES
"Chief Complaint: \"I did very well during the stimulator trial.\"      Brief History: Ms. Renée Laurent is a 60 y.o. female, who returns to the clinic for possible spinal cord stimulator reprogramming and removal of spinal cord stimulator trial leads placed on 02/01/2021. Ms. Renée Laurent reports 70% pain relief of her chronic abdominal and back pain along with remarkable functional improvement with the use of her stimulator device. Patient reports improvement of her nocturnal pain with the use of the SCS device.  Renée Laurent did not require of SCS reprogramming during the trial. Patient was able to operate her stimulator device without difficulties. Patient did not take additional analgesics throughout her spinal cord stimulator trial. She decreased her Belbuca to once daily during her trial. She has remained afebrile throughout the trial. Dressings are dry and intact. Patient denies any complications related to the procedure. Patient is very satisfied with the trial and would like to move forward with implantation of a spinal cord stimulator device.   Pain level is rated as 2/10 with the stimulator \"turned on.”   Patient level ranges from 1/10 to 2/10 with the use of the spinal cord stimulator.    Patient denies pain, numbness and weakness in the lower extremities.  Patient denies any new bladder or bowel problems.   Pain History: Ms. Renée Laurent originally referred by Dr. Jade Toro in consultation for chronic intractable abdominal pain. Patient reports a several year history of abdominal pain, which began without incident. Patient was originally scheduled on September 22, 2020 but failed to show up to her appointment due to sickness. Patient has been referred by Dr. Jade Toro in consultation for intrathecal pump therapies and spinal cord stimulation therapies as conservative measures and previous interventions including celiac plexus blocks have been not " effective in controlling her chronic pain. Patient has already already undergone psychological consultation with Dr. Maycol Govea and has been found to be an appropriate candidate for spinal cord stimulation and intrathecal therapies.  Patient has a history of chronic abdominal pain since she was a teenager. Her pain significant increase by 2005 associated with significant weight loss.  Eventually, patient was diagnosed with pancreatitis and was found to have pancreatic divisum. Patient underwent follow up consultation with Dr. Roderick Mehta on 07/13/2020.  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 S/p gastrojejunostomy in 2011.  History of poorly controlled diabetes.  Patient has developed chronic abdominal pain refractory to conservative measures.  She has been maximized on GI medications.  In addition, she is s/p gastric pacemaker in 2018 for treatment of gastroparesis by Dr. Cantrell. Patient also presents with significant comorbidities particularly a history of anxiety and depression. She has missed several medical appointments due to intractable nausea, vomiting, and severe abdominal pain. She has been on long-term opiate therapy. She has been treated with adjuvant medications including gabapentin and Pristiq. She underwent neurolytic blocks with limited pain relief. She also complains of right upper extremity pain and neck pain.  She is a s/p cervical discectomy and fusion.  Pain has progressed in intensity over the past months. She also has a history of neck pain into the right arm s/p ACDF in 1990s  Pain Description: Constant pain with intermittent exacerbation, described as aching, burning, stabbing and throbbing sensation. Her nausea and vomiting have improved since implantation of a gastric pacemaker.   Radiation of Pain: The pain radiates from the abdomen into her lower back  Pain intensity today: 5/10  Average pain intensity last  week: 6/10  Pain intensity ranges from: 3/10 to 8/10  Aggravating factors: activities, eating  Alleviating factors: Pain decreases with analgesics  Associated Symptoms:   Patient denies pain, numbness and weakness in the lower extremities.   Patient denies any new bladder or bowel problems.   Patient denies difficulties with her balance or recent falls     Review of previous therapies and additional medical records:  Renée Laurent has already failed the following measures, including:   Conservative Measures: Oral analgesics, opioids, topical analgesics, physical therapy   Interventional Measures: Celiac plexus blocks  Surgical Measures: Total pancreatectomy with auto islet cell transplantation due to chronic pancreatitis in 2011. S/p gastrojejunostomy in 2011.  S/p gastric pacemaker in 2018 for treatment of gastroparesis by Dr. Cantrell. She reports a total of 10 abdominal surgeries  Patient underwent psychological consultation with Dr. Cindi alfaro on July 22, 2020: From a psychological perspective, patient is considered to be an appropriate candidate for an implantable spinal cord stimulator device or an intrathecal pump device at this time  Renée Laurent presents with significant comorbidities including anxiety, depression, diabetes, asthma, gastroparesis, s/p [employer]tr[employer] gastric pacemaker, GERD, hyperlipidemia, insomnia, history of kidney stones, myeloid leukemia, osteopenia, history of multiple abdominal surgeries   In terms of current analgesics, Renée Laurent takes: Buprenorphine (Belbuca) 450 mcg twice daily. Patient also takes BuSpar, Marinol, fluoxetine, olanzapine, ondansetron  I have reviewed Mannie Report #[id number] (Belbuca, dronabinol by Dr. Jade Toro) consistent with medication reconciliation.    Global Pain Scale 09-22 2020 02-04 2021               Pain 15    9               Feelings 7    4               Clinical outcomes 15  10               Activities 17  10   "             GPS Total: 54  33                 Review of New Diagnostic Studies:   CT thoracic and lumbar spine w/o contrast 11/06/2020: Thoracolumbar spondylosis. Grade 1 anterolisthesis of L5 on S1.     Previous Diagnostic Studies:   Abdominal x-rays December 17, 2018: Gastric stimulator generator is located in the right lower quadrant.  2 leads extending continuously with electrode tips projecting over the left upper quadrant over the proximal gastric body.  Lower lumbar facet arthropathy    The following portions of the patient's history were reviewed and updated as appropriate: problem list, past medical history, past surgery history, social history, family history, medications, and allergies    Review of Systems   Constitutional: Positive for appetite change and fatigue.   HENT: Positive for postnasal drip and rhinorrhea.    Gastrointestinal: Positive for abdominal distention, constipation, nausea and vomiting.   Musculoskeletal: Positive for neck pain.   All other systems reviewed and are negative.      /90   Pulse 84   Resp 14   Ht 165.1 cm (65\")   Wt 66.1 kg (145 lb 11.6 oz)   LMP  (LMP Unknown)   SpO2 98%   BMI 24.25 kg/m²       Physical Exam; From today's exam;  Constitutional: Patient appears well-developed and well-nourished.   HEENT: Head: Normocephalic and atraumatic.   Eyes: Conjunctivae and lids are normal.   Pupils: Equal, round, reactive to light.   Neck: Trachea normal. Neck supple. No JVD present.   Lymphatic: No cervical adenopathy  Pulmonary Respiratory effort: No increased work of breathing or signs of respiratory distress. Auscultation of lungs: Clear to auscultation.   Cardiovascular Auscultation of heart: Normal rate and rhythm, normal S1 and S2, no murmurs.   Abdomen: The abdomen was soft and nontender except in the right upper quadrant. Incisional hernia. Bowel sounds were normal.   Lymphatic: Right: No inguinal adenopathy present. Left: No inguinal adenopathy present. "    Musculoskeletal   Gait and station: Gait evaluation demonstrated a normal gait   Lumbar spine: Passive and active range of motion are limited secondary to abdominal pain. Flexion of the lumbar spine increased and reproduced her abdominal pain. Lumbar facet joint loading maneuvers are negative  Jesus test and Gaenslen's test are negative   Hip joints: The range of motion of the hip joints is almost full and without pain   Neurological:   Patient is alert and oriented to person, place, and time.   Speech: Normal.   Cortical function: Normal mental status.   Cranial nerves 2-12: intact.   Reflex Scores:  Right brachioradialis: 2+  Left brachioradialis: 2+  Right biceps: 2+  Left biceps: 2+  Right triceps: 2+  Left triceps: 2+  Right patellar: 2+  Left patellar: 2+  Right Achilles: 2+  Left Achilles: 2+  Motor strength: 5/5  Motor Tone: Normal   Negative long tract signs. Straight leg raising test is negative.   Sensory exam: intact to light touch, pain, temperature, vibration and proprioception   Coordination: Normal finger to nose and heel to shin. Normal balance and negative Romberg's sign   Skin and subcutaneous tissue: Skin is warm and intact. No rash noted. No cyanosis.   Psychiatric: Judgment and insight: Normal. Recent and remote memory: Intact. Mood and affect: Normal.     PROCEDURES:   Procedure #1: Analysis of the spinal cord stimulator device without spinal cord stimulator reprogramming   Patient used the [employer] spinal cord stimulator device 100% of the time since initiation of the trial phase. There are three programs;  Program A1:   Electrode polarities: 0-, 1-, 3+  Amplitude: 2.6 mA   Pulse width: 530 mcs  Rate: 40 Hz  Patient experienced significant pain relief with coverage with pleasant paresthesia in all areas of chronic pain.  A copy of the telemetry report will be scanned in the patient's chart.    Procedure #2: Removal of spinal cord stimulator trial leads.   Two leads were removed with  tips intact. There is no erythema, drainage, or fluid accumulation at the site. The area was cleansed with chlorhexidine.  A small Covaderm was applied.     ASSESSMENT:   1. Intractable abdominal pain    2. History of chronic pancreatitis    3. Post-pancreatectomy diabetes (CMS/HCC)    4. Type 1 diabetes mellitus without complications (CMS/HCC)    5. Gastroparesis    6. Presence of gastric pacemaker    7. Osteopenia of other site    8. Adjustment disorder with depressed mood    9. Anxiety and depression    10. Encounter for fitting and adjustment of neuropacemaker of spinal cord        PLAN/MEDICAL DECISION MAKING:  Patient's chronic pain condition has been significantly improved during her SCS trial. Patient reports a several year history of unrelenting abdominal pain s/p 10 intra-abdominal surgeries. Patient has been referred by Dr. Jade Toro in consultation for intrathecal pump therapies and spinal cord stimulation therapies as conservative measures and previous interventions including celiac plexus blocks have been not effective in controlling her chronic pain. Patient has already already undergone psychological consultation with Dr. Maycol Govea and has been found to be an appropriate candidate for spinal cord stimulation and intrathecal therapies. Patient has a history of chronic abdominal pain since she was a teenager. Her pain significant increase by 2005 associated with significant weight loss.  Eventually, patient was diagnosed with pancreatitis and was found to have pancreatic divisum. Patient underwent follow up consultation with Dr. Roderick Mehta on 07/13/2020.  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 S/p gastrojejunostomy in 2011. Patient has developed chronic abdominal and back pain refractory to conservative measures. She has been maximized on all GI medications. In addition, she is s/p gastric pacemaker in 2018 for treatment  of gastroparesis by Dr. Cantrell. Patient also presents with significant comorbidities particularly a history of anxiety and depression. She has missed several medical appointments due to intractable nausea, vomiting, and severe abdominal pain. She has been on long-term opiate therapy. She has been treated with adjuvant medications including gabapentin and Pristiq. She underwent neurolytic blocks with limited pain relief. Patient has previously failed to obtain pain relief with conservative and interventional pain managemenet measures, as referenced under HPI. Patient is very satisfied with the trial and would like to move forward with implantation of a spinal cord stimulator device.  I have reviewed all available patient's medical records as well as previous therapies as referenced above. I had a lengthy conversation with Ms. Renée Laurent regarding her chronic pain condition and potential therapeutic options including risks, benefits, alternative therapies, to name a few. Therefore, I have proposed the following plan:  1. Referral to Dr. Kedar Estevez in consultation for implantation of a spinal cord stimulator device. I have recommended [employer] Specify SureScan 2x8 MRI paddle with the top electrodes projecting at the level of the superior endplate of the T5 vertebral level. I have recommended [employer] Intellis with [employer]. Patient will follow-up with IGLESIA Still.   2. Pharmacological measures: Reviewed. Discussed. Patient takes Buprenorphine (Belbuca) 450 mcg twice daily,. Patient also takes BuSpar, Marinol, fluoxetine, olanzapine, ondansetron  3. Long-term rehabilitation efforts:  A. The patient does not have a history of falls. I did complete a risk assessment for falls  B. Patient will start a comprehensive physical [facility] program at  [facility] with [facility] Frank Dobbins for reconditioning, therapeutic exercise, core strengthening, gait and balance training, neurodynamics,  myofascial release, cupping and dry needling once pain is under control  C. Contrast therapy: Apply ice-packs for 15-20 minutes, followed by heating pads for 15-20 minutes to affected area   4. The patient has been instructed to contact my office with any questions or difficulties. The patient understands the plan and agrees to proceed accordingly.        Patient Care Team:  Karla Torres MD as PCP - General (Family Medicine)  Kiko Mehta MD as Consulting Physician (Gastroenterology)  Isai Henry Jr., MD (Transplant Surgery)  Ray Clark MD (Endocrinology)  Clarissa Goldman APRN as Nurse Practitioner (Psychiatry)  Maycol Govea, PhD (Psychology)  Jade Toro MD as Consulting Physician (Hospice and Palliative Medicine)  Eliseo Kumar MD as Consulting Physician (Pain Medicine)     No orders of the defined types were placed in this encounter.        Future Appointments   Date Time Provider Department Center   2/19/2021  1:00 PM Vincent Marquez MD [facility] None   4/13/2021 11:45 AM Jade Toro MD [facility] None         Eliseo Kumar MD      EMR Dragon/Transcription disclaimer:  Much of this encounter note is an electronic transcription of spoken language to printed text. Electronic transcription of spoken language may permit erroneous, or at times, nonsensical words or phrases to be inadvertently transcribed. Although I have reviewed the note for such errors, some may still exist.

## 2021-02-01 NOTE — TELEPHONE ENCOUNTER
Caller: JANI JIMENES      Relationship to patient: SELF    Best call back number: [phone]      Type of visit: PROCEDURE    Requested date: 2/1/21    If rescheduling, when is the original appointment:N/A    Additional notes:PATIENT WAS WANTING TO KNOW IF THERE WAS A LATER TIME AVAILABLE FOR HER APPT FOR THE PROCEDURE WITH DR GOODMAN ON 2/4/21- PLEASE ADVISE PATIENT.

## 2021-02-02 ENCOUNTER — TELEPHONE (OUTPATIENT)
Dept: ONCOLOGY | Facility: CLINIC | Age: 61
End: 2021-02-02

## 2021-02-02 ENCOUNTER — TELEPHONE (OUTPATIENT)
Dept: PAIN MEDICINE | Facility: CLINIC | Age: 61
End: 2021-02-02

## 2021-02-02 NOTE — TELEPHONE ENCOUNTER
SCS trial with medtronic 02/01/2021.    Spoke with patient. She reports she is doing OK. She does c/o some soreness. But other than that, she is fine.   Patient aware of follow-up on 02/04/2021 @ 7:45AM.

## 2021-02-02 NOTE — TELEPHONE ENCOUNTER
Faxed request this morning with successful report at 9:53am. Attmepted to call and see if they could verify receipt and LVM with info and to call back if not received.

## 2021-02-02 NOTE — TELEPHONE ENCOUNTER
[facility] needs the fax about review sent back by 3pm today     If you need to give call please call  [phone] ext [phone]  With Ref number [id number]

## 2021-02-04 ENCOUNTER — OFFICE VISIT (OUTPATIENT)
Dept: PAIN MEDICINE | Facility: CLINIC | Age: 61
End: 2021-02-04

## 2021-02-04 VITALS
BODY MASS INDEX: 24.28 KG/M2 | SYSTOLIC BLOOD PRESSURE: 140 MMHG | RESPIRATION RATE: 14 BRPM | OXYGEN SATURATION: 98 % | WEIGHT: 145.72 LBS | HEART RATE: 84 BPM | HEIGHT: 65 IN | DIASTOLIC BLOOD PRESSURE: 90 MMHG

## 2021-02-04 DIAGNOSIS — Z87.19 HISTORY OF CHRONIC PANCREATITIS: ICD-10-CM

## 2021-02-04 DIAGNOSIS — E13.9 POST-PANCREATECTOMY DIABETES (HCC): ICD-10-CM

## 2021-02-04 DIAGNOSIS — E89.1 POST-PANCREATECTOMY DIABETES (HCC): ICD-10-CM

## 2021-02-04 DIAGNOSIS — Z96.89 PRESENCE OF GASTRIC PACEMAKER: ICD-10-CM

## 2021-02-04 DIAGNOSIS — R10.9 INTRACTABLE ABDOMINAL PAIN: ICD-10-CM

## 2021-02-04 DIAGNOSIS — Z46.2 ENCOUNTER FOR FITTING AND ADJUSTMENT OF NEUROPACEMAKER OF SPINAL CORD: ICD-10-CM

## 2021-02-04 DIAGNOSIS — Z90.410 POST-PANCREATECTOMY DIABETES (HCC): ICD-10-CM

## 2021-02-04 DIAGNOSIS — E10.9 TYPE 1 DIABETES MELLITUS WITHOUT COMPLICATIONS (HCC): ICD-10-CM

## 2021-02-04 DIAGNOSIS — M85.88 OSTEOPENIA OF OTHER SITE: ICD-10-CM

## 2021-02-04 DIAGNOSIS — F41.9 ANXIETY AND DEPRESSION: ICD-10-CM

## 2021-02-04 DIAGNOSIS — F32.A ANXIETY AND DEPRESSION: ICD-10-CM

## 2021-02-04 DIAGNOSIS — K31.84 GASTROPARESIS: ICD-10-CM

## 2021-02-04 DIAGNOSIS — F43.21 ADJUSTMENT DISORDER WITH DEPRESSED MOOD: ICD-10-CM

## 2021-02-04 PROCEDURE — 95970 ALYS NPGT W/O PRGRMG: CPT | Performed by: ANESTHESIOLOGY

## 2021-02-04 PROCEDURE — 99024 POSTOP FOLLOW-UP VISIT: CPT | Performed by: ANESTHESIOLOGY

## 2021-03-10 ENCOUNTER — OFFICE VISIT (OUTPATIENT)
Dept: NEUROSURGERY | Facility: CLINIC | Age: 61
End: 2021-03-10

## 2021-03-10 ENCOUNTER — PREP FOR SURGERY (OUTPATIENT)
Dept: OTHER | Facility: HOSPITAL | Age: 61
End: 2021-03-10

## 2021-03-10 VITALS — BODY MASS INDEX: 24.72 KG/M2 | HEIGHT: 65 IN | RESPIRATION RATE: 15 BRPM | WEIGHT: 148.4 LBS | TEMPERATURE: 97.3 F

## 2021-03-10 DIAGNOSIS — R10.9 INTRACTABLE ABDOMINAL PAIN: Primary | ICD-10-CM

## 2021-03-10 DIAGNOSIS — R10.9 ABDOMINAL PAIN: Primary | ICD-10-CM

## 2021-03-10 DIAGNOSIS — M54.9 MECHANICAL BACK PAIN: ICD-10-CM

## 2021-03-10 PROCEDURE — 99204 OFFICE O/P NEW MOD 45 MIN: CPT | Performed by: NEUROLOGICAL SURGERY

## 2021-03-10 RX ORDER — PANTOPRAZOLE SODIUM 40 MG/1
TABLET, DELAYED RELEASE ORAL
Qty: 90 TABLET | Refills: 0 | Status: SHIPPED | OUTPATIENT
Start: 2021-03-10 | End: 2021-08-20

## 2021-03-10 RX ORDER — CEFAZOLIN SODIUM 2 G/100ML
2 INJECTION, SOLUTION INTRAVENOUS ONCE
Status: CANCELLED | OUTPATIENT
Start: 2021-03-10 | End: 2021-03-10

## 2021-03-10 NOTE — H&P
Patient: Renée Laurent  : 1960     Primary Care Provider: Karla Torres MD     Requesting Provider: As above           History     Chief Complaint: Low back abdominal pain, thoracolumbar pain, and bilateral flank pain.     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  She has undergone numerous abdominal interventions.  She has a gastric stimulator in place that is a Medtronic unit.  Her main complaint is abdominal pain.  The pain is mostly in the right abdomen will extend around into her back.  Sometimes she has flank pain that extends around into her ribs.  She has no bowel or bladder dysfunction.  Her pain is worse with eating or physical activity.  A recent trial of a Medtronic epidural spinal cord stimulator produced significant improvement in her symptoms and she is referred here for formal stimulator placement.     Review of Systems   Constitutional: Negative for activity change, appetite change, chills, diaphoresis, fatigue, fever and unexpected weight change.   HENT: Negative for congestion, dental problem, drooling, ear discharge, ear pain, facial swelling, hearing loss, mouth sores, nosebleeds, postnasal drip, rhinorrhea, sinus pressure, sneezing, sore throat, tinnitus, trouble swallowing and voice change.    Eyes: Negative for photophobia, pain, discharge, redness, itching and visual disturbance.   Respiratory: Negative for apnea, cough, choking, chest tightness, shortness of breath, wheezing and stridor.    Cardiovascular: Negative for chest pain, palpitations and leg swelling.   Gastrointestinal: Positive for abdominal pain. Negative for abdominal distention, anal bleeding, blood in stool, constipation, diarrhea, nausea, rectal pain and vomiting.   Endocrine: Negative for cold intolerance, heat intolerance, polydipsia, polyphagia and polyuria.   Genitourinary: Negative for decreased urine volume,  difficulty urinating, dysuria, enuresis, flank pain, frequency, genital sores, hematuria and urgency.   Musculoskeletal: Negative for arthralgias, back pain, gait problem, joint swelling, myalgias, neck pain and neck stiffness.   Skin: Negative for color change, pallor, rash and wound.   Allergic/Immunologic: Negative for environmental allergies, food allergies and immunocompromised state.   Neurological: Negative for dizziness, tremors, seizures, syncope, facial asymmetry, speech difficulty, weakness, light-headedness, numbness and headaches.   Hematological: Negative for adenopathy. Does not bruise/bleed easily.   Psychiatric/Behavioral: Negative for agitation, behavioral problems, confusion, decreased concentration, dysphoric mood, hallucinations, self-injury, sleep disturbance and suicidal ideas. The patient is not nervous/anxious and is not hyperactive.    All other systems reviewed and are negative.        The patient's past medical history, past surgical history, family history, and social history have been reviewed at length in the electronic medical record.     Past Medical History:   Diagnosis Date   • Abdominal pain    • Absence of pancreas, acquired    • Allergic    • Anemia    • Anxiety    • Asthma    • Bile reflux gastritis    • Chronic illness    • Contact dermatitis     due to plants   • Depression    • Diabetes mellitus (CMS/[facility])    • Diabetes mellitus type I (CMS/HCC) 2011    Type 3c - surgically induced   • Eating disorder     Resolved   • Encounter for dental examination 07/2014   • Gastroparesis    • GERD (gastroesophageal reflux disease)    • History of medical problems 12/1/2011    Gastroparesis   • Hyperlipidemia    • Hypoglycemia 2011   • Incisional hernia    • Insomnia    • Iron deficiency    • Kidney stone     Left kidney   • Myeloid leukemia (CMS/[facility])    • Osteopenia    • Osteoporosis 2015    Osteopenia   • Pancreatitis    • Pneumonia 1/1/2017   • Vitamin D deficiency      Past Surgical  History:   Procedure Laterality Date   • ABDOMINAL SURGERY      x 10   • CERVICAL DISCECTOMY ANTERIOR  1997    ACDF w/ fusion -Dr. Jerald Lopez   • CHOLECYSTECTOMY     • CHOLECYSTECTOMY     • COLONOSCOPY     • COSMETIC SURGERY      Rhinoplasty   • GASTRIC STIMULATOR IMPLANT SURGERY  12/17/2018    [facility]   • HERNIA MESH REMOVAL  2014    abdominal hernia with mesh    • HYSTERECTOMY     • OTHER SURGICAL HISTORY      duodeum removed   • OTHER SURGICAL HISTORY      jejunem removed   • PANCREATECTOMY     • SMALL INTESTINE SURGERY  12/1/2011    Tp-ait   • TOTAL ABDOMINAL HYSTERECTOMY WITH SALPINGO OOPHORECTOMY     • TUBAL ABDOMINAL LIGATION     • UPPER GASTROINTESTINAL ENDOSCOPY  10/25/2017    Dr Mehta, gastritis, eosinphils in esophagus     Family History   Problem Relation Age of Onset   • Osteoporosis Mother    • Cancer Mother         CLL   • Hashimoto's thyroiditis Mother    • Arthritis Mother    • Depression Mother    • Thyroid disease Mother         Hashimoto’s   • Lung cancer Father    • Alcohol abuse Father    • Cancer Father         Lung   • Liver disease Father    • Asthma Sister      Social History     Socioeconomic History   • Marital status: Single     Spouse name: Not on file   • Number of children: Not on file   • Years of education: Not on file   • Highest education level: Not on file   Tobacco Use   • Smoking status: Never Smoker   • Smokeless tobacco: Never Used   Substance and Sexual Activity   • Alcohol use: No   • Drug use: No   • Sexual activity: Not Currently     Partners: Male     Birth control/protection: Post-menopausal       Allergies   Allergen Reactions   • Aspirin Hives   • Phenergan [Promethazine Hcl]      Anxiety/relessness   • Reglan [Metoclopramide]      Involuntary movement       Current Outpatient Medications on File Prior to Visit   Medication Sig Dispense Refill   • ALPHA LIPOIC ACID PO Take 400 mg by mouth Daily.     • B Complex Vitamins (VITAMIN B COMPLEX PO) Take 1  tablet by mouth Daily.     • Biotin 55193 MCG tablet Take 1 tablet by mouth Daily.     • Buprenorphine HCl (Belbuca) 450 MCG film Apply 1 film to cheek 2 (two) times a day for 30 days. 60 film 0   • busPIRone (BUSPAR) 5 MG tablet TAKE ONE TABLET BY MOUTH TWICE A DAY 60 tablet 2   • coenzyme Q10 50 MG capsule capsule Take 50 mg by mouth Daily.     • dronabinol (MARINOL) 5 MG capsule Take 1 capsule by mouth 2 (Two) Times a Day Before Meals for 90 days. 180 capsule 0   • glucagon (GLUCAGON EMERGENCY) 1 MG injection Inject 1 mg into the appropriate muscle as directed by prescriber 1 (One) Time As Needed for Low Blood Sugar for up to 1 dose. 1 kit 11   • glucose blood test strip Used to test blood sugar 4 times daily for diabetes. E10.9 150 each 12   • hydrOXYzine (ATARAX) 25 MG tablet 1/2 to 1 tablet TID PRN itching 12 tablet 0   • Insulin Disposable Pump (OmniPod 5 Pack) misc 0.45 Units Every 1 (One) Hour AND 1 Units Daily With Breakfast, Lunch & Dinner. Use 1 pod every 3 day.  and 1 unit for 30 g carb bolus 30 each 3   • insulin lispro (humaLOG, ADMELOG) 100 UNIT/ML injection USE IN PUMP 0.45 UNITS PER HOUR BASAL DOSE WITH 2 UNITS PER 30 GRAMS OF CARB BOLUS, WITH MEALS OR SNACKS.  MAXIMUM 45 UNITS PER DAY 20 mL 2   • MAGNESIUM GLYCINATE PLUS PO Take 1 tablet by mouth Daily.     • Medium Chain Triglycerides (MCT OIL PO) Take 15 mL by mouth Daily.     • Multiple Vitamins-Minerals (AQUADEKS PO) Take  by mouth.     • OLANZapine (zyPREXA) 2.5 MG tablet Take 1 tablet by mouth Every Night for 90 days. 90 tablet 0   • ondansetron (ZOFRAN) 4 MG tablet TAKE ONE TABLET BY MOUTH EVERY 8 HOURS AS NEEDED FOR NAUSEA AND VOMITING 60 tablet 0   • pancrelipase, Lip-Prot-Amyl, (Creon) 07499-90528 units capsule delayed-release particles capsule TAKE 4 TO 5 CAPSULES BY MOUTH THREE TIMES A DAY WITH MEALS AND 2 TO 3 CAPSULES WITH SNACK 720 capsule 3   • pantoprazole (PROTONIX) 40 MG EC tablet TAKE ONE TABLET BY MOUTH EVERY MORNING ON AN  "EMPTY STOMACH. WAIT AT LEAST 30 MINUTES TO 1 HOUR BEFORE EATING 90 tablet 0   • Probiotic Product (PROBIOTIC DAILY PO) Take  by mouth.     • riFAXIMin (Xifaxan) 550 MG tablet Take 550 mg by mouth.     • sucralfate (Carafate) 1 g tablet Take 1 tablet by mouth 4 (Four) Times a Day. 90 tablet 3   • tiZANidine (ZANAFLEX) 2 MG tablet 1/2 to 1 tab TID PRN muscle spasm 21 tablet 0   • tretinoin (Retin-A) 0.05 % cream Apply  topically to the appropriate area as directed Every Night. 20 g 2   • Unable to find Take 500 mg by mouth Daily. Bacopa     • Unable to find Take 1 each by mouth Daily. Med Name:Ashwaghanda     • Unable to find 1 each 1 (One) Time. Med Name: Triphala     • Unable to find Take 1 each by mouth Daily. Med Name: Calcium     • vitamin C (ASCORBIC ACID) 500 MG tablet Take 500 mg by mouth Daily.     • vitamin D (ERGOCALCIFEROL) 20233 UNITS capsule capsule TAKE ONE CAPSULE BY MOUTH EVERY 7 DAYS 4 capsule 0   • [DISCONTINUED] pantoprazole (PROTONIX) 40 MG EC tablet TAKE ONE TABLET BY MOUTH EVERY MORNING ON AN EMPTY STOMACH. WAIT AT LEAST 30 MINUTES TO ONE HOUR BEFORE EATING 90 tablet 0     No current facility-administered medications on file prior to visit.        Physical Exam:   Temp 97.3 °F (36.3 °C) (Infrared)   Resp 15   Ht 165.1 cm (65\")   Wt 67.3 kg (148 lb 6.4 oz)   LMP  (LMP Unknown)   BMI 24.70 kg/m²   CONSTITUTIONAL: Patient is well-nourished, pleasant and appears stated age.  CV: Heart regular rate and rhythm without murmur, rub, or gallop.  PULMONARY: Lungs are clear to ascultation.  MUSCULOSKELETAL:  Neck tenderness to palpation is not observed.   ROM in neck is normal.  Gastric stimulator generator is present on her right abdominal wall.  NEUROLOGICAL:  Orientation, memory, attention span, language function, and cognition have been examined and are intact.  Strength is intact in the upper and lower extremities to direct testing.  Muscle tone is normal throughout.  Station and gait are " normal.  Sensation is intact to light touch testing throughout.  Deep tendon reflexes are 1+ and symmetrical.  Anabell's Sign is negative bilaterally. No clonus is elicited.  Coordination is intact.        Medical Decision Making     Data Review:   CT of the lumbar spine demonstrates some degenerative disc disease with disc base narrowing at L1-2.  There is moderate stenosis at 4-5.     Thoracic MRI study does not demonstrate any overt stenosis.     Diagnosis:   Chronic abdominal pain.     Treatment Options:   Dr. Kumar has requested Verimedtronic spinal cord stimulator placement with the 2 x 8 lead projecting to the superior T5 endplate level.  The nature of the procedure as well as the potential risks, complications, limitations, and alternatives to the procedure were discussed at length with the patient and the patient has agreed to proceed with surgery.  We will need to make sure that her gastric stimulator is in a surgery/safe mode.          Diagnosis Plan   1. Intractable abdominal pain      2. Mechanical back pain

## 2021-03-10 NOTE — PROGRESS NOTES
Patient: Renée Laurent  : 1960    Primary Care Provider: Karla Torres MD    Requesting Provider: As above        History    Chief Complaint: Low back abdominal pain, thoracolumbar pain, and bilateral flank pain.    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  She has undergone numerous abdominal interventions.  She has a gastric stimulator in place that is a Medtronic unit.  Her main complaint is abdominal pain.  The pain is mostly in the right abdomen will extend around into her back.  Sometimes she has flank pain that extends around into her ribs.  She has no bowel or bladder dysfunction.  Her pain is worse with eating or physical activity.  A recent trial of a Medtronic epidural spinal cord stimulator produced significant improvement in her symptoms and she is referred here for formal stimulator placement.    Review of Systems   Constitutional: Negative for activity change, appetite change, chills, diaphoresis, fatigue, fever and unexpected weight change.   HENT: Negative for congestion, dental problem, drooling, ear discharge, ear pain, facial swelling, hearing loss, mouth sores, nosebleeds, postnasal drip, rhinorrhea, sinus pressure, sneezing, sore throat, tinnitus, trouble swallowing and voice change.    Eyes: Negative for photophobia, pain, discharge, redness, itching and visual disturbance.   Respiratory: Negative for apnea, cough, choking, chest tightness, shortness of breath, wheezing and stridor.    Cardiovascular: Negative for chest pain, palpitations and leg swelling.   Gastrointestinal: Positive for abdominal pain. Negative for abdominal distention, anal bleeding, blood in stool, constipation, diarrhea, nausea, rectal pain and vomiting.   Endocrine: Negative for cold intolerance, heat intolerance, polydipsia, polyphagia and polyuria.   Genitourinary: Negative for decreased urine volume, difficulty  "urinating, dysuria, enuresis, flank pain, frequency, genital sores, hematuria and urgency.   Musculoskeletal: Negative for arthralgias, back pain, gait problem, joint swelling, myalgias, neck pain and neck stiffness.   Skin: Negative for color change, pallor, rash and wound.   Allergic/Immunologic: Negative for environmental allergies, food allergies and immunocompromised state.   Neurological: Negative for dizziness, tremors, seizures, syncope, facial asymmetry, speech difficulty, weakness, light-headedness, numbness and headaches.   Hematological: Negative for adenopathy. Does not bruise/bleed easily.   Psychiatric/Behavioral: Negative for agitation, behavioral problems, confusion, decreased concentration, dysphoric mood, hallucinations, self-injury, sleep disturbance and suicidal ideas. The patient is not nervous/anxious and is not hyperactive.    All other systems reviewed and are negative.      The patient's past medical history, past surgical history, family history, and social history have been reviewed at length in the electronic medical record.    Physical Exam:   Temp 97.3 °F (36.3 °C) (Infrared)   Resp 15   Ht 165.1 cm (65\")   Wt 67.3 kg (148 lb 6.4 oz)   LMP  (LMP Unknown)   BMI 24.70 kg/m²   CONSTITUTIONAL: Patient is well-nourished, pleasant and appears stated age.  CV: Heart regular rate and rhythm without murmur, rub, or gallop.  PULMONARY: Lungs are clear to ascultation.  MUSCULOSKELETAL:  Neck tenderness to palpation is not observed.   ROM in neck is normal.  Gastric stimulator generator is present on her right abdominal wall.  NEUROLOGICAL:  Orientation, memory, attention span, language function, and cognition have been examined and are intact.  Strength is intact in the upper and lower extremities to direct testing.  Muscle tone is normal throughout.  Station and gait are normal.  Sensation is intact to light touch testing throughout.  Deep tendon reflexes are 1+ and symmetrical.  Anabell's " Sign is negative bilaterally. No clonus is elicited.  Coordination is intact.      Medical Decision Making    Data Review:   CT of the lumbar spine demonstrates some degenerative disc disease with disc base narrowing at L1-2.  There is moderate stenosis at 4-5.    Thoracic MRI study does not demonstrate any overt stenosis.    Diagnosis:   Chronic abdominal pain.    Treatment Options:   Dr. Kumar has requested Medtronic spinal cord stimulator placement with the 2 x 8 lead projecting to the superior T5 endplate level.  The nature of the procedure as well as the potential risks, complications, limitations, and alternatives to the procedure were discussed at length with the patient and the patient has agreed to proceed with surgery.  We will need to make sure that her gastric stimulator is in a surgery/safe mode.       Diagnosis Plan   1. Intractable abdominal pain     2. Mechanical back pain         Scribed for Kedar Estevez MD by Aleksandra Barron CMA on 3/10/2021 11:46 EST       I, Dr. Estevez, personally performed the services described in the documentation, as scribed in my presence, and it is both accurate and complete.

## 2021-03-21 ENCOUNTER — PATIENT MESSAGE (OUTPATIENT)
Dept: PALLIATIVE CARE | Facility: CLINIC | Age: 61
End: 2021-03-21

## 2021-03-21 DIAGNOSIS — G89.29 CHRONIC ABDOMINAL PAIN: ICD-10-CM

## 2021-03-21 DIAGNOSIS — R10.9 CHRONIC ABDOMINAL PAIN: ICD-10-CM

## 2021-03-22 RX ORDER — BUPRENORPHINE HYDROCHLORIDE 450 UG/1
1 FILM, SOLUBLE BUCCAL 2 TIMES DAILY
Qty: 60 FILM | Refills: 0 | Status: SHIPPED | OUTPATIENT
Start: 2021-03-26 | End: 2021-04-13 | Stop reason: SDUPTHER

## 2021-03-22 NOTE — TELEPHONE ENCOUNTER
From: Renée Laurent  To: Jade Toro MD  Sent: 3/21/2021 2:21 PM EDT  Subject: Prescription Question    Shawna,    Hope you are well. I don’t have an appointment with Dr. Toro until mid-April and my prescription for Belbuca 450mcg films 2x/day will be out on 3/26. Would you be able to have Dr. Toro send in the refill if she doesn’t already have one set up?     Thanks so much.     Renée

## 2021-03-23 DIAGNOSIS — R63.0 DECREASE IN APPETITE: ICD-10-CM

## 2021-03-23 DIAGNOSIS — F41.9 ANXIETY: ICD-10-CM

## 2021-03-23 DIAGNOSIS — K31.84 GASTROPARESIS: ICD-10-CM

## 2021-03-23 RX ORDER — ONDANSETRON 4 MG/1
TABLET, FILM COATED ORAL
Qty: 60 TABLET | Refills: 0 | Status: SHIPPED | OUTPATIENT
Start: 2021-03-23 | End: 2021-04-22 | Stop reason: SDUPTHER

## 2021-03-23 RX ORDER — BUSPIRONE HYDROCHLORIDE 5 MG/1
TABLET ORAL
Qty: 60 TABLET | Refills: 1 | Status: SHIPPED | OUTPATIENT
Start: 2021-03-23 | End: 2021-04-22 | Stop reason: SDUPTHER

## 2021-03-23 RX ORDER — DRONABINOL 5 MG/1
5 CAPSULE ORAL
Qty: 60 CAPSULE | Refills: 2 | Status: SHIPPED | OUTPATIENT
Start: 2021-03-23 | End: 2021-05-24 | Stop reason: SDUPTHER

## 2021-04-13 DIAGNOSIS — G89.29 CHRONIC ABDOMINAL PAIN: ICD-10-CM

## 2021-04-13 DIAGNOSIS — R10.9 CHRONIC ABDOMINAL PAIN: ICD-10-CM

## 2021-04-13 RX ORDER — BUPRENORPHINE HYDROCHLORIDE 450 UG/1
1 FILM, SOLUBLE BUCCAL 2 TIMES DAILY
Qty: 60 FILM | Refills: 0 | Status: SHIPPED | OUTPATIENT
Start: 2021-04-25 | End: 2021-05-20

## 2021-04-13 NOTE — TELEPHONE ENCOUNTER
Pt needed to cancel apt for today and has rescheduled for 5/4 @ 10:45. Pt scheduled to get SCS on 5/10.  Refill sent for 4/25 for Belbuca.

## 2021-04-21 DIAGNOSIS — E89.1 POST-PANCREATECTOMY DIABETES (HCC): ICD-10-CM

## 2021-04-21 DIAGNOSIS — Z90.410 POST-PANCREATECTOMY DIABETES (HCC): ICD-10-CM

## 2021-04-21 DIAGNOSIS — K91.2 POSTSURGICAL MALABSORPTION: ICD-10-CM

## 2021-04-21 DIAGNOSIS — E13.9 POST-PANCREATECTOMY DIABETES (HCC): ICD-10-CM

## 2021-04-21 DIAGNOSIS — Z90.410 ACQUIRED TOTAL ABSENCE OF PANCREAS: ICD-10-CM

## 2021-04-21 RX ORDER — PANCRELIPASE 24000; 76000; 120000 [USP'U]/1; [USP'U]/1; [USP'U]/1
CAPSULE, DELAYED RELEASE PELLETS ORAL
Qty: 500 CAPSULE | Refills: 0 | OUTPATIENT
Start: 2021-04-21

## 2021-04-22 ENCOUNTER — TELEMEDICINE (OUTPATIENT)
Dept: INTERNAL MEDICINE | Facility: CLINIC | Age: 61
End: 2021-04-22

## 2021-04-22 VITALS — WEIGHT: 145 LBS | BODY MASS INDEX: 24.13 KG/M2

## 2021-04-22 DIAGNOSIS — E10.9 TYPE 1 DIABETES MELLITUS WITHOUT COMPLICATIONS (HCC): ICD-10-CM

## 2021-04-22 DIAGNOSIS — F41.9 ANXIETY: ICD-10-CM

## 2021-04-22 DIAGNOSIS — E89.1 POST-PANCREATECTOMY DIABETES (HCC): ICD-10-CM

## 2021-04-22 DIAGNOSIS — Z90.410 POST-PANCREATECTOMY DIABETES (HCC): ICD-10-CM

## 2021-04-22 DIAGNOSIS — E13.9 POST-PANCREATECTOMY DIABETES (HCC): ICD-10-CM

## 2021-04-22 DIAGNOSIS — K31.84 GASTROPARESIS: ICD-10-CM

## 2021-04-22 DIAGNOSIS — R79.89 LOW SERUM PREALBUMIN: ICD-10-CM

## 2021-04-22 DIAGNOSIS — K91.2 POSTSURGICAL MALABSORPTION: ICD-10-CM

## 2021-04-22 DIAGNOSIS — Z90.410 ACQUIRED TOTAL ABSENCE OF PANCREAS: ICD-10-CM

## 2021-04-22 DIAGNOSIS — Z12.31 ENCOUNTER FOR SCREENING MAMMOGRAM FOR MALIGNANT NEOPLASM OF BREAST: ICD-10-CM

## 2021-04-22 DIAGNOSIS — Z78.0 MENOPAUSE: Primary | ICD-10-CM

## 2021-04-22 DIAGNOSIS — E55.9 VITAMIN D DEFICIENCY: ICD-10-CM

## 2021-04-22 PROCEDURE — 99214 OFFICE O/P EST MOD 30 MIN: CPT | Performed by: FAMILY MEDICINE

## 2021-04-22 RX ORDER — ONDANSETRON 4 MG/1
4 TABLET, FILM COATED ORAL EVERY 8 HOURS PRN
Qty: 60 TABLET | Refills: 0 | Status: SHIPPED | OUTPATIENT
Start: 2021-04-22 | End: 2022-04-18 | Stop reason: SDUPTHER

## 2021-04-22 RX ORDER — BUSPIRONE HYDROCHLORIDE 5 MG/1
5 TABLET ORAL 2 TIMES DAILY
Qty: 60 TABLET | Refills: 3 | Status: SHIPPED | OUTPATIENT
Start: 2021-04-22 | End: 2021-09-14

## 2021-04-22 RX ORDER — PANCRELIPASE 24000; 76000; 120000 [USP'U]/1; [USP'U]/1; [USP'U]/1
CAPSULE, DELAYED RELEASE PELLETS ORAL
Qty: 720 CAPSULE | Refills: 3 | Status: SHIPPED | OUTPATIENT
Start: 2021-04-22

## 2021-04-22 NOTE — PROGRESS NOTES
Subjective   Renée Laurent is a 60 y.o. female.     Chief Complaint   Patient presents with   • Diabetes   • Med Refill     This was an audio and video enabled telemedicine encounter.    You have chosen to receive care through a telehealth visit.  Do you consent to use a video/audio connection for your medical care today? Yes    Visit Vitals  Wt 65.8 kg (145 lb) Comment: per pt   LMP  (LMP Unknown)   BMI 24.13 kg/m²         Diabetes  She presents for her follow-up diabetic visit. She has type 1 diabetes mellitus. Her disease course has been stable. Hypoglycemia symptoms include sweats and tremors. Pertinent negatives for hypoglycemia include no dizziness or headaches. (Palpitations, light headed) Associated symptoms include fatigue. Pertinent negatives for diabetes include no blurred vision, no chest pain, no foot paresthesias, no foot ulcerations, no polydipsia, no polyphagia, no polyuria, no visual change, no weakness and no weight loss. Pertinent negatives for hypoglycemia complications include no required assistance. (Pt drank Mexican Coke) Symptoms are stable. Diabetic complications include autonomic neuropathy. Pertinent negatives for diabetic complications include no nephropathy, peripheral neuropathy, PVD or retinopathy. Risk factors for coronary artery disease include dyslipidemia, diabetes mellitus and post-menopausal. Current diabetic treatment includes intensive insulin program. Her weight is stable. She is following a generally healthy diet. Meal planning includes avoidance of concentrated sweets. She participates in exercise daily. Her home blood glucose trend is fluctuating dramatically. Her breakfast blood glucose range is generally 110-130 mg/dl. An ACE inhibitor/angiotensin II receptor blocker is not being taken. She does not see a podiatrist.Eye exam is not current.        Pt had her second Covid vaccine today.     Pt has seen a Neurosurgeon for spinal cord stimulator and had 50% relief on  trial.    Last prealbumin was low. Pt needs refill of digestive enzymes for gastroparesis and digestive issues from pracreatectomy.     Pt has had low vitamin D levels.     Pt reports that her anxiety is stable on Buspar.     The following portions of the patient's history were reviewed and updated as appropriate: allergies, current medications, past family history, past medical history, past social history, past surgical history and problem list.    Past Medical History:   Diagnosis Date   • Abdominal pain    • Absence of pancreas, acquired    • Allergic    • Anemia    • Anxiety    • Asthma    • Bile reflux gastritis    • Chronic illness    • Contact dermatitis     due to plants   • Depression    • Diabetes mellitus (CMS/HCC)    • Diabetes mellitus type I (CMS/HCC) 2011    Type 3c - surgically induced   • Eating disorder     Resolved   • Encounter for dental examination 07/2014   • Gastroparesis    • GERD (gastroesophageal reflux disease)    • History of medical problems 12/1/2011    Gastroparesis   • Hyperlipidemia    • Hypoglycemia 2011   • Incisional hernia    • Insomnia    • Iron deficiency    • Kidney stone     Left kidney   • Myeloid leukemia (CMS/HCC)    • Osteopenia    • Osteoporosis 2015    Osteopenia   • Pancreatitis    • Pneumonia 1/1/2017   • Vitamin D deficiency       Past Surgical History:   Procedure Laterality Date   • ABDOMINAL SURGERY      x 10   • CERVICAL DISCECTOMY ANTERIOR  1997    ACDF w/ fusion -Dr. Jerald Lopez   • CHOLECYSTECTOMY     • CHOLECYSTECTOMY     • COLONOSCOPY     • COSMETIC SURGERY      Rhinoplasty   • GASTRIC STIMULATOR IMPLANT SURGERY  12/17/2018    [facility]   • HERNIA MESH REMOVAL  2014    abdominal hernia with mesh    • HYSTERECTOMY     • OTHER SURGICAL HISTORY      duodeum removed   • OTHER SURGICAL HISTORY      jejunem removed   • PANCREATECTOMY     • SMALL INTESTINE SURGERY  12/1/2011    Tp-ait   • TOTAL ABDOMINAL HYSTERECTOMY WITH SALPINGO OOPHORECTOMY     •  TUBAL ABDOMINAL LIGATION     • UPPER GASTROINTESTINAL ENDOSCOPY  10/25/2017    Dr Mehta, gastritis, eosinphils in esophagus      Family History   Problem Relation Age of Onset   • Osteoporosis Mother    • Cancer Mother         CLL   • Hashimoto's thyroiditis Mother    • Arthritis Mother    • Depression Mother    • Thyroid disease Mother         Hashimoto’s   • Lung cancer Father    • Alcohol abuse Father    • Cancer Father         Lung   • Liver disease Father    • Asthma Sister       Social History     Socioeconomic History   • Marital status: Single     Spouse name: Not on file   • Number of children: Not on file   • Years of education: Not on file   • Highest education level: Not on file   Tobacco Use   • Smoking status: Never Smoker   • Smokeless tobacco: Never Used   Substance and Sexual Activity   • Alcohol use: No   • Drug use: No   • Sexual activity: Not Currently     Partners: Male     Birth control/protection: Post-menopausal      Allergies   Allergen Reactions   • Aspirin Hives   • Phenergan [Promethazine Hcl]      Anxiety/relessness   • Reglan [Metoclopramide]      Involuntary movement       Review of Systems   Constitutional: Positive for fatigue. Negative for chills, diaphoresis, fever and weight loss.   HENT: Negative for postnasal drip, rhinorrhea, sinus pressure and sore throat.    Eyes: Negative for blurred vision, redness and itching.   Respiratory: Negative for cough, shortness of breath and wheezing.    Cardiovascular: Negative for chest pain, palpitations and leg swelling.   Gastrointestinal: Positive for abdominal distention, abdominal pain, constipation, diarrhea, nausea and vomiting. Negative for blood in stool and rectal pain.   Endocrine: Negative for cold intolerance, heat intolerance, polydipsia, polyphagia and polyuria.   Genitourinary: Positive for frequency. Negative for dysuria and urgency.   Musculoskeletal: Positive for back pain. Negative for arthralgias, gait problem, joint  swelling, myalgias, neck pain and neck stiffness.   Allergic/Immunologic: Positive for environmental allergies.   Neurological: Positive for tremors. Negative for dizziness, syncope, weakness, light-headedness and headaches.   Hematological: Negative for adenopathy. Does not bruise/bleed easily.   Psychiatric/Behavioral: Positive for sleep disturbance. Negative for dysphoric mood and suicidal ideas.     PHQ-2 Depression Screening  Little interest or pleasure in doing things? 0   Feeling down, depressed, or hopeless? 0   PHQ-2 Total Score 0       Objective   Physical Exam  Constitutional:       Appearance: Normal appearance.      Comments: Video visit   Eyes:      Extraocular Movements: Extraocular movements intact.   Pulmonary:      Effort: Pulmonary effort is normal. No respiratory distress.   Musculoskeletal:      Cervical back: Normal range of motion.   Neurological:      Mental Status: She is alert and oriented to person, place, and time.   Psychiatric:         Mood and Affect: Mood normal.         Behavior: Behavior normal.         Thought Content: Thought content normal.         Judgment: Judgment normal.         Assessment/Plan   Diagnoses and all orders for this visit:    1. Menopause (Primary)  -     DEXA Bone Density Axial; Future    2. Post-pancreatectomy diabetes (CMS/[facility])  -     pancrelipase, Lip-Prot-Amyl, (Creon) 80816-65606 units capsule delayed-release particles capsule; TAKE 4 TO 5 CAPSULES BY MOUTH THREE TIMES A DAY WITH MEALS AND 2 TO 3 CAPSULES WITH SNACK  Dispense: 720 capsule; Refill: 3    3. Postsurgical malabsorption  -     pancrelipase, Lip-Prot-Amyl, (Creon) 51227-25561 units capsule delayed-release particles capsule; TAKE 4 TO 5 CAPSULES BY MOUTH THREE TIMES A DAY WITH MEALS AND 2 TO 3 CAPSULES WITH SNACK  Dispense: 720 capsule; Refill: 3    4. Acquired total absence of pancreas  -     pancrelipase, Lip-Prot-Amyl, (Creon) 54395-64698 units capsule delayed-release particles capsule; TAKE 4  TO 5 CAPSULES BY MOUTH THREE TIMES A DAY WITH MEALS AND 2 TO 3 CAPSULES WITH SNACK  Dispense: 720 capsule; Refill: 3    5. Gastroparesis  -     ondansetron (ZOFRAN) 4 MG tablet; Take 1 tablet by mouth Every 8 (Eight) Hours As Needed for Nausea or Vomiting.  Dispense: 60 tablet; Refill: 0    6. Anxiety  -     busPIRone (BUSPAR) 5 MG tablet; Take 1 tablet by mouth 2 (Two) Times a Day.  Dispense: 60 tablet; Refill: 3    7. Type 1 diabetes mellitus without complications (CMS/HCC)  -     glucose blood test strip; Used to test blood sugar 4 times daily for diabetes. E10.9  Dispense: 150 each; Refill: 12  -     Comprehensive Metabolic Panel; Future  -     TSH Rfx On Abnormal To Free T4; Future  -     Lipid Panel; Future  -     Hemoglobin A1c; Future    8. Encounter for screening mammogram for malignant neoplasm of breast  -     Mammo Screening Digital Tomosynthesis Bilateral With CAD; Future    9. Low serum prealbumin  -     Prealbumin; Future    10. Vitamin D deficiency  -     Vitamin D 25 Hydroxy; Future    Other orders  -     glucagon (GLUCAGEN) 1 MG injection; Inject 1 mg into the appropriate muscle as directed by prescriber 1 (One) Time As Needed for Low Blood Sugar for up to 1 dose.  Dispense: 1 kit; Refill: 11                   Current Outpatient Medications:   •  ALPHA LIPOIC ACID PO, Take 400 mg by mouth Daily., Disp: , Rfl:   •  B Complex Vitamins (VITAMIN B COMPLEX PO), Take 1 tablet by mouth Daily., Disp: , Rfl:   •  Biotin 85208 MCG tablet, Take 1 tablet by mouth Daily., Disp: , Rfl:   •  [START ON 4/25/2021] Buprenorphine HCl (Belbuca) 450 MCG film, Apply 1 film to cheek 2 (two) times a day for 30 days., Disp: 60 film, Rfl: 0  •  busPIRone (BUSPAR) 5 MG tablet, Take 1 tablet by mouth 2 (Two) Times a Day., Disp: 60 tablet, Rfl: 3  •  coenzyme Q10 50 MG capsule capsule, Take 50 mg by mouth Daily., Disp: , Rfl:   •  dronabinol (MARINOL) 5 MG capsule, Take 1 capsule by mouth 2 (Two) Times a Day Before Meals for  90 days., Disp: 60 capsule, Rfl: 2  •  glucagon (GLUCAGEN) 1 MG injection, Inject 1 mg into the appropriate muscle as directed by prescriber 1 (One) Time As Needed for Low Blood Sugar for up to 1 dose., Disp: 1 kit, Rfl: 11  •  glucose blood test strip, Used to test blood sugar 4 times daily for diabetes. E10.9, Disp: 150 each, Rfl: 12  •  hydrOXYzine (ATARAX) 25 MG tablet, 1/2 to 1 tablet TID PRN itching, Disp: 12 tablet, Rfl: 0  •  Insulin Disposable Pump (OmniPod 5 Pack) misc, 0.45 Units Every 1 (One) Hour AND 1 Units Daily With Breakfast, Lunch & Dinner. Use 1 pod every 3 day.  and 1 unit for 30 g carb bolus, Disp: 30 each, Rfl: 3  •  insulin lispro (humaLOG, ADMELOG) 100 UNIT/ML injection, USE IN PUMP 0.45 UNITS PER HOUR BASAL DOSE WITH 2 UNITS PER 30 GRAMS OF CARB BOLUS, WITH MEALS OR SNACKS.  MAXIMUM 45 UNITS PER DAY, Disp: 20 mL, Rfl: 2  •  MAGNESIUM GLYCINATE PLUS PO, Take 1 tablet by mouth Daily., Disp: , Rfl:   •  Medium Chain Triglycerides (MCT OIL PO), Take 15 mL by mouth Daily., Disp: , Rfl:   •  Multiple Vitamins-Minerals (AQUADEKS PO), Take  by mouth., Disp: , Rfl:   •  OLANZapine (zyPREXA) 2.5 MG tablet, Take 1 tablet by mouth Every Night for 90 days., Disp: 90 tablet, Rfl: 0  •  ondansetron (ZOFRAN) 4 MG tablet, Take 1 tablet by mouth Every 8 (Eight) Hours As Needed for Nausea or Vomiting., Disp: 60 tablet, Rfl: 0  •  pancrelipase, Lip-Prot-Amyl, (Creon) 51206-76996 units capsule delayed-release particles capsule, TAKE 4 TO 5 CAPSULES BY MOUTH THREE TIMES A DAY WITH MEALS AND 2 TO 3 CAPSULES WITH SNACK, Disp: 720 capsule, Rfl: 3  •  pantoprazole (PROTONIX) 40 MG EC tablet, TAKE ONE TABLET BY MOUTH EVERY MORNING ON AN EMPTY STOMACH. WAIT AT LEAST 30 MINUTES TO 1 HOUR BEFORE EATING, Disp: 90 tablet, Rfl: 0  •  Probiotic Product (PROBIOTIC DAILY PO), Take  by mouth., Disp: , Rfl:   •  riFAXIMin (Xifaxan) 550 MG tablet, Take 550 mg by mouth., Disp: , Rfl:   •  sucralfate (Carafate) 1 g tablet, Take 1  tablet by mouth 4 (Four) Times a Day., Disp: 90 tablet, Rfl: 3  •  tiZANidine (ZANAFLEX) 2 MG tablet, 1/2 to 1 tab TID PRN muscle spasm, Disp: 21 tablet, Rfl: 0  •  tretinoin (Retin-A) 0.05 % cream, Apply  topically to the appropriate area as directed Every Night., Disp: 20 g, Rfl: 2  •  Unable to find, Take 500 mg by mouth Daily. Bacopa, Disp: , Rfl:   •  Unable to find, Take 1 each by mouth Daily. Med Name:Ashwaghanda, Disp: , Rfl:   •  Unable to find, 1 each 1 (One) Time. Med Name: Triphala, Disp: , Rfl:   •  Unable to find, Take 1 each by mouth Daily. Med Name: Calcium, Disp: , Rfl:   •  vitamin C (ASCORBIC ACID) 500 MG tablet, Take 500 mg by mouth Daily., Disp: , Rfl:   •  vitamin D (ERGOCALCIFEROL) 58542 UNITS capsule capsule, TAKE ONE CAPSULE BY MOUTH EVERY 7 DAYS, Disp: 4 capsule, Rfl: 0    Return in about 3 months (around 7/22/2021), or if symptoms worsen or fail to improve, for Medicare Wellness, Recheck.

## 2021-05-04 ENCOUNTER — TELEMEDICINE (OUTPATIENT)
Dept: PALLIATIVE CARE | Facility: CLINIC | Age: 61
End: 2021-05-04

## 2021-05-04 DIAGNOSIS — G89.29 CHRONIC ABDOMINAL PAIN: Primary | ICD-10-CM

## 2021-05-04 DIAGNOSIS — Z59.89 HAS HEALTH INSURANCE WITH INADEQUATE COVERAGE OF HEALTH EXPENSES: ICD-10-CM

## 2021-05-04 DIAGNOSIS — E10.43 DIABETIC GASTROPARESIS ASSOCIATED WITH TYPE 1 DIABETES MELLITUS (HCC): ICD-10-CM

## 2021-05-04 DIAGNOSIS — K31.84 DIABETIC GASTROPARESIS ASSOCIATED WITH TYPE 1 DIABETES MELLITUS (HCC): ICD-10-CM

## 2021-05-04 DIAGNOSIS — R10.9 CHRONIC ABDOMINAL PAIN: Primary | ICD-10-CM

## 2021-05-04 PROCEDURE — 99214 OFFICE O/P EST MOD 30 MIN: CPT | Performed by: INTERNAL MEDICINE

## 2021-05-04 SDOH — ECONOMIC STABILITY - INCOME SECURITY: OTHER PROBLEMS RELATED TO HOUSING AND ECONOMIC CIRCUMSTANCES: Z59.89

## 2021-05-04 NOTE — PROGRESS NOTES
This provider note, based on phone call or other Telemedicine alternative, was created to either supplement or replace provision of in-person palliative care services by a provider (physician or nurse practitioner).  Patient verbally consented to this telehealth encounter.    Patient expresses taxing physical effort to leave the home, in person appointment burden, or length of travel burden as reason to request telehealth appt today.    Background:  Patient Care Team:  Karla Torres MD as PCP - General (Family Medicine)  Kiko Mehta MD as Consulting Physician (Gastroenterology)  Isai Henry Jr., MD (Transplant Surgery)  Ray Clark MD (Endocrinology)  Clarissa Goldman APRN as Nurse Practitioner (Psychiatry)  Maycol Govea, PhD (Psychology)  Jade Toro MD as Consulting Physician (Hospice and Palliative Medicine)  Eliseo Kumar MD as Consulting Physician (Pain Medicine)  Kedar Estevez MD as Surgeon (Neurosurgery)  Betty Mathis DO as Consulting Physician (Endocrinology)    Subjective     History of Present Illness  60yoF with h/o T1DM, gastroparesis s/p permanent gastric pacemaker placement on 12/17/2018. Also with h/o anxiety, depression.  She has had chronic abdominal pain since she was a teenager, with acute worsening in 2005 associated with significant weight loss. She was diagnosed with pancreatitis and in fact was found to have pancreatic divisum.  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  Her most troublesome symptom now is gastroparesis. She failed metoclopramide therapy due to development of Parkinsonian symptoms. She underwent placement of gastic pacemaker placement in 12/17/2018 though unfortunately has continued to have some gastroparesis. Pain: complains of pain due to chronic pancreatitis, nerve pain due to nerve damage.  Epigastric bloating pain and dull ache.  Impairs sleep  - sleeps only 30 min at a time then up again due to pain.  Limits her activity outside of home. As of 2020, does not even leave home weekly.  Many missed medical appts due to intractable nausea +/- emesis and pain.  Has been on opioid therapy, Gabapentin, Pristiq, has undergone neurolytic interventions for abdominal pain.  Has concurrent chronic R arm radicular pain and cervical neck pain s/p diskectomy/fusion/rhizotomy. However, the debilitating pain is of the abdomen with nausea.  See 2/20/20 initial consultation note for detailed history.     Noted that PCP is managing patient's issues re: thyroid and vitamins.     Care coordination messages:  [facility] will not manage pacemaker, so she will need to continue f/u with [facility] for pacemaker.  She was referred to [facility] for general GI f/u, had telemedicine visit scheduled 7/13/20 but pt called.       Referred to Dr. Kumar for Interventional Pain options. Plan for SCS implantation after effective trial.  This is scheduled with Dr. Estevez 5/10/21.    PSYCHOSOCIAL/SPIRITUAL:  Social History     Socioeconomic History   • Marital status: Single     Spouse name: Not on file   • Number of children: Not on file   • Years of education: Not on file   • Highest education level: Not on file   Tobacco Use   • Smoking status: Never Smoker   • Smokeless tobacco: Never Used   Substance and Sexual Activity   • Alcohol use: No   • Drug use: No   • Sexual activity: Not Currently     Partners: Male     Birth control/protection: Post-menopausal       INTERIM HISTORY:  3 month f/u.  Dr. Chiu was introduced to patient    Current medications:  Belbuca 450mcg BID  Dronabinol 5mg BID PRN - does not take maximum 2/2 too much hunger and intake and then increase post-prandial pain    Increase in epigastric pain recently, but also has had to decrease pancreatic enzymes   ANALGESIA:  effective  ADVERSE EFFECTS:  No constipation  ACTIVITY:  Independent of ADLs and IADLs  AFFECT:  Normal  ABERRANT  BEHAVIORS:  No calls for early fills    ECOG: (1) Restricted in physically strenuous activity, ambulatory and able to do work of light nature    Physical Exam:  General:  Well-kempt, hair fixed, appears to be wearing light makeup  HEENT;  Anicteric  PULM:  No cough, no breathlessness with conversation  Neuro:  Alert, normal speech, no apparent cognitive deficit  Psych:  Normal affect, normal speech and tone      UDS:  THC, Screen, Urine   Date Value Ref Range Status   09/24/2020 Negative Negative Final     Phencyclidine (PCP), Urine   Date Value Ref Range Status   09/24/2020 Negative Negative Final     Cocaine Screen, Urine   Date Value Ref Range Status   09/24/2020 Negative Negative Final     Methamphetamine, Ur   Date Value Ref Range Status   09/24/2020 Negative Negative Final     Opiate Screen   Date Value Ref Range Status   09/24/2020 Negative Negative Final     Amphetamine Screen, Urine   Date Value Ref Range Status   09/24/2020 Negative Negative Final     Benzodiazepine Screen, Urine   Date Value Ref Range Status   09/24/2020 Negative Negative Final     Tricyclic Antidepressants Screen   Date Value Ref Range Status   09/24/2020 Negative Negative Final     Methadone Screen, Urine   Date Value Ref Range Status   09/24/2020 Negative Negative Final     Barbiturates Screen, Urine   Date Value Ref Range Status   09/24/2020 Negative Negative Final     Oxycodone Screen, Urine   Date Value Ref Range Status   09/24/2020 Negative Negative Final     Propoxyphene Screen   Date Value Ref Range Status   09/24/2020 Negative Negative Final     Buprenorphine, Screen, Urine   Date Value Ref Range Status   09/24/2020 Positive (A) Negative Final               YADI:  Reviewed.  No concerns.  Consistent with history.  Prescribers identified as members of care team.     CONTROLLED MEDICATION TRACKING FLOWSHEET:  CONTROLLED SUBSTANCE TRACKING 2/20/2020 5/28/2020 6/30/2020 10/15/2020 1/12/2021   Last Yadi 2/19/2020 5/27/2020  6/29/2020 10/14/2020 1/11/2021   Report Number [id number]   Last UDS - 2/20/2020 2/20/2020 9/24/2020 9/24/2020   Last Controlled Substance Agreement - 2/20/2020 2/20/2020 2/20/2020 2/20/2020   ORT Initial Risk Score 2 2 2 2 2   Prior UDT result - Expected Expected Expected Expected   Pill count Did not bring Expected Expected Short Expected   Diversion Concern No No No No No   Disposal Education and Agreement [id number]       Assessment/Plan   Diagnoses and all orders for this visit:    1. Chronic abdominal pain (Primary)    2. Diabetic gastroparesis associated with type 1 diabetes mellitus (CMS/[facility])    3. Has health insurance with inadequate coverage of health expenses    Noted insurance not covering cost of her pancreatic enzymes, which is impacting her chronic pain         PLAN:  1. Refill: Pt to contact office when due for Belbuca refills (around 5/25).  She will let us know if she is ready to taper her dose based on response to SCS  2.  Orders needed for next appt:  UDT per randomization practices.    3.  Pt advised to call our clinical line [phone] for any symptom or side effect concerns, new clinical needs, or for refill needs per usual clinic policies.    FOLLOW UP:  6 weeks with Dr. Chiu to assess readiness for Belbuca tapering.      ENCOUNTER TIME:  16 min F2F    MEDICAL COMPLEXITY:     High risk medication requiring monitoring for adverse effects, including PDMP review, UDT trend review

## 2021-05-06 ENCOUNTER — TELEPHONE (OUTPATIENT)
Dept: NEUROSURGERY | Facility: CLINIC | Age: 61
End: 2021-05-06

## 2021-05-06 NOTE — TELEPHONE ENCOUNTER
Provider:  Herb  Caller: ptTime of call:     Phone #:  [phone]  Surgery:  SPS  Surgery Date:  5-10-21  Last visit:  3-10-21   Next visit: ROBERT GRULLON:         Reason for call:    Pt has called back again and asking questions about her upcoming SPS. She said she was sorry about playing phone  tag.

## 2021-05-06 NOTE — TELEPHONE ENCOUNTER
Provider:  Herb  Caller: pt  Time of call:   9:58  Phone #:  [phone]  Surgery:  SPS  Surgery Date:  5-10-21  Last visit: 3-10-21    Next visit: ROBERT GRULLON:         Reason for call:       Patient had questions about  Upcoming surgery. She wanted to know about her nerve block and her post op pain meds. Please Advise. Thank you

## 2021-05-07 ENCOUNTER — APPOINTMENT (OUTPATIENT)
Dept: PREADMISSION TESTING | Facility: HOSPITAL | Age: 61
End: 2021-05-07

## 2021-05-07 ENCOUNTER — PRE-ADMISSION TESTING (OUTPATIENT)
Dept: PREADMISSION TESTING | Facility: HOSPITAL | Age: 61
End: 2021-05-07

## 2021-05-07 VITALS — BODY MASS INDEX: 25.16 KG/M2 | HEIGHT: 65 IN | WEIGHT: 151 LBS

## 2021-05-07 DIAGNOSIS — R79.89 LOW SERUM PREALBUMIN: ICD-10-CM

## 2021-05-07 DIAGNOSIS — E10.9 TYPE 1 DIABETES MELLITUS WITHOUT COMPLICATIONS (HCC): ICD-10-CM

## 2021-05-07 DIAGNOSIS — E55.9 VITAMIN D DEFICIENCY: ICD-10-CM

## 2021-05-07 PROBLEM — N39.0 UTI (URINARY TRACT INFECTION): Status: ACTIVE | Noted: 2021-05-07

## 2021-05-07 LAB
25(OH)D3 SERPL-MCNC: 11.3 NG/ML (ref 30–100)
ALBUMIN SERPL-MCNC: 4.2 G/DL (ref 3.5–5.2)
ALBUMIN/GLOB SERPL: 1.4 G/DL
ALP SERPL-CCNC: 219 U/L (ref 39–117)
ALT SERPL W P-5'-P-CCNC: 47 U/L (ref 1–33)
ANION GAP SERPL CALCULATED.3IONS-SCNC: 10 MMOL/L (ref 5–15)
AST SERPL-CCNC: 58 U/L (ref 1–32)
BILIRUB SERPL-MCNC: 0.4 MG/DL (ref 0–1.2)
BUN SERPL-MCNC: 12 MG/DL (ref 8–23)
BUN/CREAT SERPL: 16.2 (ref 7–25)
CALCIUM SPEC-SCNC: 9.3 MG/DL (ref 8.6–10.5)
CHLORIDE SERPL-SCNC: 103 MMOL/L (ref 98–107)
CHOLEST SERPL-MCNC: 146 MG/DL (ref 0–200)
CO2 SERPL-SCNC: 27 MMOL/L (ref 22–29)
CREAT SERPL-MCNC: 0.74 MG/DL (ref 0.57–1)
DEPRECATED RDW RBC AUTO: 48.6 FL (ref 37–54)
ERYTHROCYTE [DISTWIDTH] IN BLOOD BY AUTOMATED COUNT: 15.3 % (ref 12.3–15.4)
GFR SERPL CREATININE-BSD FRML MDRD: 80 ML/MIN/1.73
GLOBULIN UR ELPH-MCNC: 3.1 GM/DL
GLUCOSE SERPL-MCNC: 67 MG/DL (ref 65–99)
HBA1C MFR BLD: 7.2 % (ref 4.8–5.6)
HCT VFR BLD AUTO: 41.1 % (ref 34–46.6)
HDLC SERPL-MCNC: 42 MG/DL (ref 40–60)
HGB BLD-MCNC: 13.2 G/DL (ref 12–15.9)
LDLC SERPL CALC-MCNC: 86 MG/DL (ref 0–100)
LDLC/HDLC SERPL: 2.01 {RATIO}
MCH RBC QN AUTO: 28.1 PG (ref 26.6–33)
MCHC RBC AUTO-ENTMCNC: 32.1 G/DL (ref 31.5–35.7)
MCV RBC AUTO: 87.6 FL (ref 79–97)
MRSA DNA SPEC QL NAA+PROBE: NEGATIVE
PLATELET # BLD AUTO: 446 10*3/MM3 (ref 140–450)
PMV BLD AUTO: 11.5 FL (ref 6–12)
POTASSIUM SERPL-SCNC: 4.7 MMOL/L (ref 3.5–5.2)
PREALB SERPL-MCNC: 11.6 MG/DL (ref 20–40)
PROT SERPL-MCNC: 7.3 G/DL (ref 6–8.5)
QT INTERVAL: 392 MS
QTC INTERVAL: 407 MS
RBC # BLD AUTO: 4.69 10*6/MM3 (ref 3.77–5.28)
SODIUM SERPL-SCNC: 140 MMOL/L (ref 136–145)
TRIGL SERPL-MCNC: 97 MG/DL (ref 0–150)
TSH SERPL DL<=0.05 MIU/L-ACNC: 2.94 UIU/ML (ref 0.27–4.2)
VLDLC SERPL-MCNC: 18 MG/DL (ref 5–40)
WBC # BLD AUTO: 14.82 10*3/MM3 (ref 3.4–10.8)

## 2021-05-07 PROCEDURE — 93010 ELECTROCARDIOGRAM REPORT: CPT | Performed by: INTERNAL MEDICINE

## 2021-05-07 PROCEDURE — 81001 URINALYSIS AUTO W/SCOPE: CPT | Performed by: PHYSICIAN ASSISTANT

## 2021-05-07 PROCEDURE — 87186 SC STD MICRODIL/AGAR DIL: CPT

## 2021-05-07 PROCEDURE — U0004 COV-19 TEST NON-CDC HGH THRU: HCPCS

## 2021-05-07 PROCEDURE — 80061 LIPID PANEL: CPT

## 2021-05-07 PROCEDURE — 36415 COLL VENOUS BLD VENIPUNCTURE: CPT

## 2021-05-07 PROCEDURE — 83036 HEMOGLOBIN GLYCOSYLATED A1C: CPT

## 2021-05-07 PROCEDURE — C9803 HOPD COVID-19 SPEC COLLECT: HCPCS

## 2021-05-07 PROCEDURE — 85027 COMPLETE CBC AUTOMATED: CPT

## 2021-05-07 PROCEDURE — 87077 CULTURE AEROBIC IDENTIFY: CPT

## 2021-05-07 PROCEDURE — 82306 VITAMIN D 25 HYDROXY: CPT

## 2021-05-07 PROCEDURE — 87641 MR-STAPH DNA AMP PROBE: CPT

## 2021-05-07 PROCEDURE — 84443 ASSAY THYROID STIM HORMONE: CPT

## 2021-05-07 PROCEDURE — 80053 COMPREHEN METABOLIC PANEL: CPT

## 2021-05-07 PROCEDURE — 93005 ELECTROCARDIOGRAM TRACING: CPT

## 2021-05-07 PROCEDURE — 84134 ASSAY OF PREALBUMIN: CPT

## 2021-05-07 PROCEDURE — U0005 INFEC AGEN DETEC AMPLI PROBE: HCPCS

## 2021-05-07 PROCEDURE — 87086 URINE CULTURE/COLONY COUNT: CPT

## 2021-05-07 RX ORDER — SULFAMETHOXAZOLE AND TRIMETHOPRIM 800; 160 MG/1; MG/1
1 TABLET ORAL 2 TIMES DAILY
Qty: 20 TABLET | Refills: 0 | Status: SHIPPED | OUTPATIENT
Start: 2021-05-07 | End: 2021-06-15

## 2021-05-08 LAB — SARS-COV-2 RNA NOSE QL NAA+PROBE: NOT DETECTED

## 2021-05-09 LAB
BACTERIA SPEC AEROBE CULT: ABNORMAL
BACTERIA SPEC AEROBE CULT: ABNORMAL

## 2021-05-10 ENCOUNTER — ANESTHESIA EVENT (OUTPATIENT)
Dept: PERIOP | Facility: HOSPITAL | Age: 61
End: 2021-05-10

## 2021-05-10 ENCOUNTER — APPOINTMENT (OUTPATIENT)
Dept: GENERAL RADIOLOGY | Facility: HOSPITAL | Age: 61
End: 2021-05-10

## 2021-05-10 ENCOUNTER — TELEPHONE (OUTPATIENT)
Dept: INTERNAL MEDICINE | Facility: CLINIC | Age: 61
End: 2021-05-10

## 2021-05-10 ENCOUNTER — ANESTHESIA (OUTPATIENT)
Dept: PERIOP | Facility: HOSPITAL | Age: 61
End: 2021-05-10

## 2021-05-10 ENCOUNTER — HOSPITAL ENCOUNTER (OUTPATIENT)
Facility: HOSPITAL | Age: 61
Discharge: HOME | End: 2021-05-10
Attending: NEUROLOGICAL SURGERY | Admitting: NEUROLOGICAL SURGERY

## 2021-05-10 VITALS
RESPIRATION RATE: 16 BRPM | TEMPERATURE: 98.7 F | SYSTOLIC BLOOD PRESSURE: 124 MMHG | DIASTOLIC BLOOD PRESSURE: 66 MMHG | OXYGEN SATURATION: 93 % | HEART RATE: 75 BPM

## 2021-05-10 DIAGNOSIS — R10.9 ABDOMINAL PAIN: ICD-10-CM

## 2021-05-10 DIAGNOSIS — R10.9 INTRACTABLE ABDOMINAL PAIN: Primary | ICD-10-CM

## 2021-05-10 PROCEDURE — 25010000002 ONDANSETRON PER 1 MG: Performed by: NURSE ANESTHETIST, CERTIFIED REGISTERED

## 2021-05-10 PROCEDURE — 25010000002 DEXAMETHASONE PER 1 MG: Performed by: NURSE ANESTHETIST, CERTIFIED REGISTERED

## 2021-05-10 PROCEDURE — 82962 GLUCOSE BLOOD TEST: CPT

## 2021-05-10 PROCEDURE — A9270 NON-COVERED ITEM OR SERVICE: HCPCS | Performed by: NURSE ANESTHETIST, CERTIFIED REGISTERED

## 2021-05-10 PROCEDURE — 76000 FLUOROSCOPY <1 HR PHYS/QHP: CPT

## 2021-05-10 PROCEDURE — C1889 IMPLANT/INSERT DEVICE, NOC: HCPCS | Performed by: NEUROLOGICAL SURGERY

## 2021-05-10 PROCEDURE — 25010000002 FENTANYL CITRATE (PF) 100 MCG/2ML SOLUTION: Performed by: NURSE ANESTHETIST, CERTIFIED REGISTERED

## 2021-05-10 PROCEDURE — 63685 INS/RPLC SPI NPG/RCVR POCKET: CPT | Performed by: PHYSICIAN ASSISTANT

## 2021-05-10 PROCEDURE — C1820 GENERATOR NEURO RECHG BAT SY: HCPCS | Performed by: NEUROLOGICAL SURGERY

## 2021-05-10 PROCEDURE — 25010000002 VANCOMYCIN 1 G RECONSTITUTED SOLUTION: Performed by: NEUROLOGICAL SURGERY

## 2021-05-10 PROCEDURE — C1787 PATIENT PROGR, NEUROSTIM: HCPCS | Performed by: NEUROLOGICAL SURGERY

## 2021-05-10 PROCEDURE — 63710000001 HYDROCODONE-ACETAMINOPHEN 5-325 MG TABLET: Performed by: NURSE ANESTHETIST, CERTIFIED REGISTERED

## 2021-05-10 PROCEDURE — 25010000003 CEFAZOLIN IN DEXTROSE 2-4 GM/100ML-% SOLUTION: Performed by: NEUROLOGICAL SURGERY

## 2021-05-10 PROCEDURE — A9270 NON-COVERED ITEM OR SERVICE: HCPCS | Performed by: ANESTHESIOLOGY

## 2021-05-10 PROCEDURE — 63655 IMPLANT NEUROELECTRODES: CPT | Performed by: NEUROLOGICAL SURGERY

## 2021-05-10 PROCEDURE — 63685 INS/RPLC SPI NPG/RCVR POCKET: CPT | Performed by: NEUROLOGICAL SURGERY

## 2021-05-10 PROCEDURE — 25010000002 PROPOFOL 10 MG/ML EMULSION: Performed by: NURSE ANESTHETIST, CERTIFIED REGISTERED

## 2021-05-10 PROCEDURE — 63710000001 FAMOTIDINE 20 MG TABLET: Performed by: ANESTHESIOLOGY

## 2021-05-10 PROCEDURE — C1778 LEAD, NEUROSTIMULATOR: HCPCS | Performed by: NEUROLOGICAL SURGERY

## 2021-05-10 PROCEDURE — L8689 EXTERNAL RECHARG SYS INTERN: HCPCS | Performed by: NEUROLOGICAL SURGERY

## 2021-05-10 PROCEDURE — 63655 IMPLANT NEUROELECTRODES: CPT | Performed by: PHYSICIAN ASSISTANT

## 2021-05-10 PROCEDURE — 25010000002 NEOSTIGMINE 10 MG/10ML SOLUTION: Performed by: NURSE ANESTHETIST, CERTIFIED REGISTERED

## 2021-05-10 DEVICE — NEUROSTM INTELLIS SURESCAN MRI W/ADAPTIVE STIM RECHG: Type: IMPLANTABLE DEVICE | Site: BACK | Status: FUNCTIONAL

## 2021-05-10 DEVICE — LD STIM SURESCAN SPECIFY MRI 2X8 65CM: Type: IMPLANTABLE DEVICE | Site: SPINE THORACIC | Status: FUNCTIONAL

## 2021-05-10 DEVICE — HEMOST ABS SURGIFOAM SZ100 8X12 10MM: Type: IMPLANTABLE DEVICE | Site: BACK | Status: FUNCTIONAL

## 2021-05-10 DEVICE — FLOSEAL HEMOSTATIC MATRIX, 10ML
Type: IMPLANTABLE DEVICE | Site: BACK | Status: FUNCTIONAL
Brand: FLOSEAL HEMOSTATIC MATRIX

## 2021-05-10 RX ORDER — MAGNESIUM HYDROXIDE 1200 MG/15ML
LIQUID ORAL AS NEEDED
Status: DISCONTINUED | OUTPATIENT
Start: 2021-05-10 | End: 2021-05-10 | Stop reason: HOSPADM

## 2021-05-10 RX ORDER — SODIUM CHLORIDE 0.9 % (FLUSH) 0.9 %
10 SYRINGE (ML) INJECTION AS NEEDED
Status: DISCONTINUED | OUTPATIENT
Start: 2021-05-10 | End: 2021-05-10 | Stop reason: HOSPADM

## 2021-05-10 RX ORDER — VANCOMYCIN HYDROCHLORIDE 1 G/20ML
INJECTION, POWDER, LYOPHILIZED, FOR SOLUTION INTRAVENOUS AS NEEDED
Status: DISCONTINUED | OUTPATIENT
Start: 2021-05-10 | End: 2021-05-10 | Stop reason: HOSPADM

## 2021-05-10 RX ORDER — OXYCODONE HYDROCHLORIDE AND ACETAMINOPHEN 5; 325 MG/1; MG/1
1 TABLET ORAL 3 TIMES DAILY PRN
Qty: 20 TABLET | Refills: 0 | Status: SHIPPED | OUTPATIENT
Start: 2021-05-10 | End: 2021-05-17 | Stop reason: SDUPTHER

## 2021-05-10 RX ORDER — DEXAMETHASONE SODIUM PHOSPHATE 4 MG/ML
INJECTION, SOLUTION INTRA-ARTICULAR; INTRALESIONAL; INTRAMUSCULAR; INTRAVENOUS; SOFT TISSUE AS NEEDED
Status: DISCONTINUED | OUTPATIENT
Start: 2021-05-10 | End: 2021-05-10 | Stop reason: SURG

## 2021-05-10 RX ORDER — SODIUM CHLORIDE 0.9 % (FLUSH) 0.9 %
3 SYRINGE (ML) INJECTION EVERY 12 HOURS SCHEDULED
Status: DISCONTINUED | OUTPATIENT
Start: 2021-05-10 | End: 2021-05-10 | Stop reason: HOSPADM

## 2021-05-10 RX ORDER — GLYCOPYRROLATE 0.2 MG/ML
INJECTION INTRAMUSCULAR; INTRAVENOUS AS NEEDED
Status: DISCONTINUED | OUTPATIENT
Start: 2021-05-10 | End: 2021-05-10 | Stop reason: SURG

## 2021-05-10 RX ORDER — FENTANYL CITRATE 50 UG/ML
INJECTION, SOLUTION INTRAMUSCULAR; INTRAVENOUS AS NEEDED
Status: DISCONTINUED | OUTPATIENT
Start: 2021-05-10 | End: 2021-05-10 | Stop reason: SURG

## 2021-05-10 RX ORDER — HYDROCODONE BITARTRATE AND ACETAMINOPHEN 5; 325 MG/1; MG/1
1 TABLET ORAL ONCE AS NEEDED
Status: DISCONTINUED | OUTPATIENT
Start: 2021-05-10 | End: 2021-05-10 | Stop reason: HOSPADM

## 2021-05-10 RX ORDER — LABETALOL HYDROCHLORIDE 5 MG/ML
5 INJECTION, SOLUTION INTRAVENOUS
Status: DISCONTINUED | OUTPATIENT
Start: 2021-05-10 | End: 2021-05-10 | Stop reason: HOSPADM

## 2021-05-10 RX ORDER — IPRATROPIUM BROMIDE AND ALBUTEROL SULFATE 2.5; .5 MG/3ML; MG/3ML
3 SOLUTION RESPIRATORY (INHALATION) ONCE AS NEEDED
Status: DISCONTINUED | OUTPATIENT
Start: 2021-05-10 | End: 2021-05-10 | Stop reason: HOSPADM

## 2021-05-10 RX ORDER — HYDRALAZINE HYDROCHLORIDE 20 MG/ML
5 INJECTION INTRAMUSCULAR; INTRAVENOUS
Status: DISCONTINUED | OUTPATIENT
Start: 2021-05-10 | End: 2021-05-10 | Stop reason: HOSPADM

## 2021-05-10 RX ORDER — NALOXONE HCL 0.4 MG/ML
0.4 VIAL (ML) INJECTION AS NEEDED
Status: DISCONTINUED | OUTPATIENT
Start: 2021-05-10 | End: 2021-05-10 | Stop reason: HOSPADM

## 2021-05-10 RX ORDER — FAMOTIDINE 20 MG/1
20 TABLET, FILM COATED ORAL
Status: COMPLETED | OUTPATIENT
Start: 2021-05-10 | End: 2021-05-10

## 2021-05-10 RX ORDER — DROPERIDOL 2.5 MG/ML
0.62 INJECTION, SOLUTION INTRAMUSCULAR; INTRAVENOUS AS NEEDED
Status: DISCONTINUED | OUTPATIENT
Start: 2021-05-10 | End: 2021-05-10 | Stop reason: HOSPADM

## 2021-05-10 RX ORDER — SODIUM CHLORIDE, SODIUM LACTATE, POTASSIUM CHLORIDE, CALCIUM CHLORIDE 600; 310; 30; 20 MG/100ML; MG/100ML; MG/100ML; MG/100ML
9 INJECTION, SOLUTION INTRAVENOUS CONTINUOUS PRN
Status: DISCONTINUED | OUTPATIENT
Start: 2021-05-10 | End: 2021-05-10 | Stop reason: HOSPADM

## 2021-05-10 RX ORDER — SODIUM CHLORIDE 0.9 % (FLUSH) 0.9 %
3-10 SYRINGE (ML) INJECTION AS NEEDED
Status: DISCONTINUED | OUTPATIENT
Start: 2021-05-10 | End: 2021-05-10 | Stop reason: HOSPADM

## 2021-05-10 RX ORDER — SODIUM CHLORIDE 0.9 % (FLUSH) 0.9 %
10 SYRINGE (ML) INJECTION EVERY 12 HOURS SCHEDULED
Status: DISCONTINUED | OUTPATIENT
Start: 2021-05-10 | End: 2021-05-10 | Stop reason: HOSPADM

## 2021-05-10 RX ORDER — ONDANSETRON 2 MG/ML
INJECTION INTRAMUSCULAR; INTRAVENOUS AS NEEDED
Status: DISCONTINUED | OUTPATIENT
Start: 2021-05-10 | End: 2021-05-10 | Stop reason: SURG

## 2021-05-10 RX ORDER — LIDOCAINE HYDROCHLORIDE 10 MG/ML
0.5 INJECTION, SOLUTION EPIDURAL; INFILTRATION; INTRACAUDAL; PERINEURAL ONCE AS NEEDED
Status: COMPLETED | OUTPATIENT
Start: 2021-05-10 | End: 2021-05-10

## 2021-05-10 RX ORDER — PROPOFOL 10 MG/ML
VIAL (ML) INTRAVENOUS AS NEEDED
Status: DISCONTINUED | OUTPATIENT
Start: 2021-05-10 | End: 2021-05-10 | Stop reason: SURG

## 2021-05-10 RX ORDER — FENTANYL CITRATE 50 UG/ML
50 INJECTION, SOLUTION INTRAMUSCULAR; INTRAVENOUS
Status: DISCONTINUED | OUTPATIENT
Start: 2021-05-10 | End: 2021-05-10 | Stop reason: HOSPADM

## 2021-05-10 RX ORDER — HYDROMORPHONE HYDROCHLORIDE 1 MG/ML
0.5 INJECTION, SOLUTION INTRAMUSCULAR; INTRAVENOUS; SUBCUTANEOUS
Status: DISCONTINUED | OUTPATIENT
Start: 2021-05-10 | End: 2021-05-10 | Stop reason: HOSPADM

## 2021-05-10 RX ORDER — LIDOCAINE HYDROCHLORIDE 10 MG/ML
INJECTION, SOLUTION EPIDURAL; INFILTRATION; INTRACAUDAL; PERINEURAL AS NEEDED
Status: DISCONTINUED | OUTPATIENT
Start: 2021-05-10 | End: 2021-05-10 | Stop reason: SURG

## 2021-05-10 RX ORDER — NEOSTIGMINE METHYLSULFATE 1 MG/ML
INJECTION, SOLUTION INTRAVENOUS AS NEEDED
Status: DISCONTINUED | OUTPATIENT
Start: 2021-05-10 | End: 2021-05-10 | Stop reason: SURG

## 2021-05-10 RX ORDER — MEPERIDINE HYDROCHLORIDE 25 MG/ML
12.5 INJECTION INTRAMUSCULAR; INTRAVENOUS; SUBCUTANEOUS
Status: DISCONTINUED | OUTPATIENT
Start: 2021-05-10 | End: 2021-05-10 | Stop reason: HOSPADM

## 2021-05-10 RX ORDER — SODIUM CHLORIDE 9 MG/ML
INJECTION, SOLUTION INTRAVENOUS AS NEEDED
Status: DISCONTINUED | OUTPATIENT
Start: 2021-05-10 | End: 2021-05-10 | Stop reason: HOSPADM

## 2021-05-10 RX ORDER — CEFAZOLIN SODIUM 2 G/100ML
2 INJECTION, SOLUTION INTRAVENOUS ONCE
Status: COMPLETED | OUTPATIENT
Start: 2021-05-10 | End: 2021-05-10

## 2021-05-10 RX ORDER — ONDANSETRON 2 MG/ML
4 INJECTION INTRAMUSCULAR; INTRAVENOUS ONCE AS NEEDED
Status: COMPLETED | OUTPATIENT
Start: 2021-05-10 | End: 2021-05-10

## 2021-05-10 RX ORDER — MIDAZOLAM HYDROCHLORIDE 1 MG/ML
1 INJECTION INTRAMUSCULAR; INTRAVENOUS
Status: DISCONTINUED | OUTPATIENT
Start: 2021-05-10 | End: 2021-05-10 | Stop reason: HOSPADM

## 2021-05-10 RX ORDER — ROCURONIUM BROMIDE 10 MG/ML
INJECTION, SOLUTION INTRAVENOUS AS NEEDED
Status: DISCONTINUED | OUTPATIENT
Start: 2021-05-10 | End: 2021-05-10 | Stop reason: SURG

## 2021-05-10 RX ORDER — DROPERIDOL 2.5 MG/ML
0.62 INJECTION, SOLUTION INTRAMUSCULAR; INTRAVENOUS ONCE AS NEEDED
Status: DISCONTINUED | OUTPATIENT
Start: 2021-05-10 | End: 2021-05-10 | Stop reason: HOSPADM

## 2021-05-10 RX ADMIN — LIDOCAINE HYDROCHLORIDE 30 MG: 10 INJECTION, SOLUTION EPIDURAL; INFILTRATION; INTRACAUDAL; PERINEURAL at 07:01

## 2021-05-10 RX ADMIN — PROPOFOL 120 MG: 10 INJECTION, EMULSION INTRAVENOUS at 07:01

## 2021-05-10 RX ADMIN — FENTANYL CITRATE 25 MCG: 50 INJECTION, SOLUTION INTRAMUSCULAR; INTRAVENOUS at 07:01

## 2021-05-10 RX ADMIN — FENTANYL CITRATE 25 MCG: 50 INJECTION, SOLUTION INTRAMUSCULAR; INTRAVENOUS at 07:13

## 2021-05-10 RX ADMIN — ROCURONIUM BROMIDE 35 MG: 10 INJECTION INTRAVENOUS at 07:01

## 2021-05-10 RX ADMIN — ONDANSETRON 4 MG: 2 INJECTION INTRAMUSCULAR; INTRAVENOUS at 08:53

## 2021-05-10 RX ADMIN — LIDOCAINE HYDROCHLORIDE 0.5 ML: 10 INJECTION, SOLUTION EPIDURAL; INFILTRATION; INTRACAUDAL; PERINEURAL at 06:20

## 2021-05-10 RX ADMIN — FENTANYL CITRATE 50 MCG: 50 INJECTION, SOLUTION INTRAMUSCULAR; INTRAVENOUS at 07:23

## 2021-05-10 RX ADMIN — ONDANSETRON 4 MG: 2 INJECTION INTRAMUSCULAR; INTRAVENOUS at 07:54

## 2021-05-10 RX ADMIN — CEFAZOLIN SODIUM 2 G: 2 INJECTION, SOLUTION INTRAVENOUS at 07:06

## 2021-05-10 RX ADMIN — FENTANYL CITRATE 50 MCG: 50 INJECTION, SOLUTION INTRAMUSCULAR; INTRAVENOUS at 08:45

## 2021-05-10 RX ADMIN — FENTANYL CITRATE 50 MCG: 50 INJECTION, SOLUTION INTRAMUSCULAR; INTRAVENOUS at 08:31

## 2021-05-10 RX ADMIN — NEOSTIGMINE 2.5 MG: 1 INJECTION INTRAVENOUS at 08:01

## 2021-05-10 RX ADMIN — GLYCOPYRROLATE 0.4 MG: 0.4 INJECTION INTRAMUSCULAR; INTRAVENOUS at 08:01

## 2021-05-10 RX ADMIN — HYDROCODONE BITARTRATE AND ACETAMINOPHEN 1 TABLET: 5; 325 TABLET ORAL at 09:40

## 2021-05-10 RX ADMIN — FAMOTIDINE 20 MG: 20 TABLET ORAL at 06:20

## 2021-05-10 RX ADMIN — DEXAMETHASONE SODIUM PHOSPHATE 4 MG: 4 INJECTION, SOLUTION INTRA-ARTICULAR; INTRALESIONAL; INTRAMUSCULAR; INTRAVENOUS; SOFT TISSUE at 07:08

## 2021-05-10 RX ADMIN — PROPOFOL 25 MCG/KG/MIN: 10 INJECTION, EMULSION INTRAVENOUS at 07:08

## 2021-05-10 RX ADMIN — SODIUM CHLORIDE, POTASSIUM CHLORIDE, SODIUM LACTATE AND CALCIUM CHLORIDE 9 ML/HR: 600; 310; 30; 20 INJECTION, SOLUTION INTRAVENOUS at 06:19

## 2021-05-10 NOTE — ANESTHESIA POSTPROCEDURE EVALUATION
Patient: Renée Laurent    Procedure Summary     Date: 05/10/21 Room / [facility]:  [facility]  [facility]    Anesthesia Start: 0656 Anesthesia Stop:     Procedure: SPINAL CORD STIMULATOR INSERTION (N/A Back) Diagnosis:       Abdominal pain      (Abdominal pain [R10.9])    Surgeons: Kedar Estevez MD Provider: Mack Alcala MD    Anesthesia Type: general ASA Status: 3          Anesthesia Type: general    Vitals  Vitals Value Taken Time   BP     Temp     Pulse     Resp     SpO2 96 % 05/10/21 0820   Vitals shown include unvalidated device data.        Post Anesthesia Care and Evaluation    Patient location during evaluation: PACU  Patient participation: waiting for patient participation  Level of consciousness: responsive to light touch  Airway patency: patent  Anesthetic complications: No anesthetic complications  PONV Status: NA  Cardiovascular status: hemodynamically stable and acceptable  Respiratory status: nonlabored ventilation, acceptable, nasal cannula and oral airway  Hydration status: acceptable

## 2021-05-10 NOTE — TELEPHONE ENCOUNTER
----- Message from Karla EDDY MD sent at 5/9/2021  7:33 PM EDT -----  ALT and AST liver tests are elevated as well as alkaline phosphatase.  Has she had recent liver ultrasound or CT scan?      Prealbumin is low.  Is she getting enough protein in her diet?  Can she take liquid nutrition such as boost, Ensure or Glucerna?      Hemoglobin A1c is  elevated.      Pt'svitamin D level was low. Please start over the counter vitamin D3 at 1000 units per day. If she is already taking vitamin D, please let us know so we can send in limited dose of prescription strength vitamin D to boost levels. Low levels of vitamin D can cause fatigue.

## 2021-05-10 NOTE — TELEPHONE ENCOUNTER
Pt notified of results, also sent a my chart msg as well so she knows what to do about protein. She states that her liver enzymes are elevated which is normal for her and she has had ct scan on her liver recently.

## 2021-05-10 NOTE — ANESTHESIA PROCEDURE NOTES
Airway  Urgency: elective    Date/Time: 5/10/2021 7:03 AM  Airway not difficult    General Information and Staff    Patient location during procedure: OR  CRNA: Navarro Gonsalez CRNA    Indications and Patient Condition  Indications for airway management: airway protection    Preoxygenated: yes  MILS not maintained throughout  Mask difficulty assessment: 1 - vent by mask    Final Airway Details  Final airway type: endotracheal airway      Successful airway: ETT  Cuffed: yes   Successful intubation technique: video laryngoscopy  Facilitating devices/methods: intubating stylet  Endotracheal tube insertion site: oral  Blade: Duran (Elective use, history of cervical spine decreased ROM)  Blade size: 3  ETT size (mm): 7.0  Cormack-Lehane Classification: grade I - full view of glottis  Placement verified by: chest auscultation and capnometry   Cuff volume (mL): 6  Measured from: lips  ETT/EBT  to lips (cm): 20  Number of attempts at approach: 1  Assessment: lips, teeth, and gum same as pre-op and atraumatic intubation    Additional Comments  Negative epigastric sounds, Breath sound equal bilaterally with symmetric chest rise and fall

## 2021-05-10 NOTE — ANESTHESIA PREPROCEDURE EVALUATION
Anesthesia Evaluation     Patient summary reviewed and Nursing notes reviewed   history of anesthetic complications: PONV  NPO Solid Status: > 8 hours  NPO Liquid Status: > 2 hours           Airway   Mallampati: III  TM distance: >3 FB  Neck ROM: full  Possible difficult intubation  Dental - normal exam     Pulmonary    (+) pneumonia resolved , asthma,decreased breath sounds,   Cardiovascular   Exercise tolerance: good (4-7 METS)    Rhythm: regular  Rate: normal    (+) hyperlipidemia,       Neuro/Psych  (+) headaches, psychiatric history Anxiety and Depression,     GI/Hepatic/Renal/Endo    (+)  GERD well controlled,  renal disease CRI, diabetes mellitus type 2 well controlled,     Musculoskeletal     Abdominal   (+) obese,     Abdomen: soft.   Substance History      OB/GYN          Other   blood dyscrasia,   history of cancer remission                    Anesthesia Plan    ASA 3     general     intravenous induction     Anesthetic plan, all risks, benefits, and alternatives have been provided, discussed and informed consent has been obtained with: patient.    Plan discussed with CRNA.

## 2021-05-17 DIAGNOSIS — R10.9 INTRACTABLE ABDOMINAL PAIN: ICD-10-CM

## 2021-05-17 RX ORDER — CYCLOBENZAPRINE HCL 10 MG
10 TABLET ORAL 3 TIMES DAILY PRN
Qty: 60 TABLET | Refills: 0 | Status: SHIPPED | OUTPATIENT
Start: 2021-05-17 | End: 2021-06-15

## 2021-05-17 RX ORDER — OXYCODONE HYDROCHLORIDE AND ACETAMINOPHEN 5; 325 MG/1; MG/1
1 TABLET ORAL 3 TIMES DAILY PRN
Qty: 20 TABLET | Refills: 0 | Status: SHIPPED | OUTPATIENT
Start: 2021-05-17 | End: 2021-05-25

## 2021-05-17 NOTE — TELEPHONE ENCOUNTER
YADI:    03/26/2021 Dronabinol 5MG 1960 60 30 NICKELS NATALIE [facility] [address] [facility] 1  04/25/2021 Belbuca 450MCG 1960 60 30 NICKELS NATALIE [facility] [address] [facility] 1  05/10/2021 Oxycodone/Acetaminophen  325MG/5MG  1960 20 6 MARGARET BLACKMON [facility] [address] [facility] 25 1

## 2021-05-17 NOTE — TELEPHONE ENCOUNTER
Provider:  Herb  Caller: Patient  Time of call:   11:47am  Phone #:  [phone]  Surgery:  SPINAL CORD STIMULATOR INSERTION  Surgery Date:  05/10/2021  Last visit:  03/10/2021   Next visit: 05/25/2021    YADI:         Reason for call:  Patient called M stating she had a SCS placed on 05/10 with Dr. Estevez, and her upper incision was causing her a great deal of pain.     I called and talked to the patient. Patient stated the upper incision is causing her a great deal of pain under her right shoulder blade. Patient stated pain has gotten worse over the last two days. Pain is worse when standing/moving, along with any movement of her arms. Pain is 6/10. Pain gets better with sitting/no movement. Patient stated she is out of medication that she was given in the hospital.     Patient is wanting to make sure she did not mess anything up.

## 2021-05-17 NOTE — TELEPHONE ENCOUNTER
Spoke with patient.  She states that she is having pain in her right paraspinous region that radiates to her right flank and anteriorly to the RUQ.  She denies significant abdominal pain.  I explained that this can be normal after SCS insertion.  She states that her incision is clean, dry and intact.  She denies lower extremity symptoms, bowel or bladder dysfunction.    She requested a short refill of the Percocet.  Can we please check a Mannie?  Flexeril also called in for her.  She was thankful for the reassurance and will call us with any additional concerns.

## 2021-05-19 DIAGNOSIS — G89.29 CHRONIC ABDOMINAL PAIN: ICD-10-CM

## 2021-05-19 DIAGNOSIS — R10.9 CHRONIC ABDOMINAL PAIN: ICD-10-CM

## 2021-05-20 RX ORDER — BUPRENORPHINE HYDROCHLORIDE 450 UG/1
FILM, SOLUBLE BUCCAL
Qty: 60 FILM | Refills: 0 | Status: SHIPPED | OUTPATIENT
Start: 2021-05-20 | End: 2021-06-22

## 2021-05-24 DIAGNOSIS — R63.0 DECREASE IN APPETITE: ICD-10-CM

## 2021-05-25 ENCOUNTER — OFFICE VISIT (OUTPATIENT)
Dept: NEUROSURGERY | Facility: CLINIC | Age: 61
End: 2021-05-25

## 2021-05-25 VITALS
TEMPERATURE: 97.5 F | BODY MASS INDEX: 25.2 KG/M2 | HEART RATE: 111 BPM | WEIGHT: 147.6 LBS | HEIGHT: 64 IN | OXYGEN SATURATION: 94 %

## 2021-05-25 DIAGNOSIS — M54.9 MECHANICAL BACK PAIN: ICD-10-CM

## 2021-05-25 DIAGNOSIS — R10.9 INTRACTABLE ABDOMINAL PAIN: Primary | ICD-10-CM

## 2021-05-25 PROCEDURE — 99024 POSTOP FOLLOW-UP VISIT: CPT | Performed by: PHYSICIAN ASSISTANT

## 2021-05-25 RX ORDER — OLANZAPINE 2.5 MG/1
2.5 TABLET ORAL NIGHTLY
Qty: 90 TABLET | Refills: 0 | Status: SHIPPED | OUTPATIENT
Start: 2021-05-25 | End: 2021-08-17

## 2021-05-25 RX ORDER — DRONABINOL 5 MG/1
5 CAPSULE ORAL
Qty: 180 CAPSULE | Refills: 0 | Status: SHIPPED | OUTPATIENT
Start: 2021-06-23 | End: 2021-09-13

## 2021-05-25 NOTE — PROGRESS NOTES
Patient: Renée Laurent  : 1960  Chart #: [id number]    Date of Service: 2021    CHIEF COMPLAINT: Chronic low back and abdominal pain    History of Present Illness patient is a 60-year-old woman with chronic pain syndrome.  Her most profound pain has been in her abdomen.  A recent trial of spinal cord stimulator provided significant improvement in her symptoms.  As such on 5/10/2021 she underwent T7 laminotomy for placement of Medtronic epidural spinal cord stimulator.    Today patient is 2 weeks postop.  She complains of discomfort at the lead site that radiates into the right paraspinal tissues.  A muscle relaxer was prescribed and symptoms have settled down.  No incisional issues.  She has not had the device programmed yet to determine if it helps with her preoperative symptoms.  Overall she is doing well      Past Medical History:   Diagnosis Date   • Abdominal pain    • Absence of pancreas, acquired    • Allergic    • Anemia    • Anxiety    • Asthma    • Basal cell carcinoma    • Bile reflux gastritis    • Chronic illness    • Contact dermatitis     due to plants   • Depression    • Diabetes mellitus (CMS/[facility])    • Diabetes mellitus type I (CMS/HCC)     Type 3c - surgically induced   • Eating disorder     Resolved   • Encounter for dental examination 2014   • Gastroparesis    • GERD (gastroesophageal reflux disease)    • History of medical problems 2011    Gastroparesis   • History of MRSA infection     [facility]  abdominal incisional infection   • Hyperlipidemia    • Hypoglycemia    • Incisional hernia    • Insomnia    • Iron deficiency    • Kidney stone     Left kidney   • Myeloid leukemia (CMS/HCC)    • Osteopenia    • Osteoporosis     Osteopenia   • Pancreatitis    • Pneumonia 2017   • PONV (postoperative nausea and vomiting)    • Spinal headache    • Vitamin D deficiency    • Wears glasses          Current Outpatient Medications:   •  ALPHA LIPOIC ACID  PO, Take 400 mg by mouth Daily., Disp: , Rfl:   •  B Complex Vitamins (VITAMIN B COMPLEX PO), Take 1 tablet by mouth Daily., Disp: , Rfl:   •  Belbuca 450 MCG film, PLACE ONE FILM TO CHEEK TWICE A DAY FOR 30 DAYS, Disp: 60 film, Rfl: 0  •  Biotin 71890 MCG tablet, Take 1 tablet by mouth Daily., Disp: , Rfl:   •  busPIRone (BUSPAR) 5 MG tablet, Take 1 tablet by mouth 2 (Two) Times a Day., Disp: 60 tablet, Rfl: 3  •  cyclobenzaprine (FLEXERIL) 10 MG tablet, Take 1 tablet by mouth 3 (Three) Times a Day As Needed for Muscle Spasms., Disp: 60 tablet, Rfl: 0  •  glucagon (GLUCAGEN) 1 MG injection, Inject 1 mg into the appropriate muscle as directed by prescriber 1 (One) Time As Needed for Low Blood Sugar for up to 1 dose., Disp: 1 kit, Rfl: 11  •  Insulin Disposable Pump (OmniPod 5 Pack) misc, 0.45 Units Every 1 (One) Hour AND 1 Units Daily With Breakfast, Lunch & Dinner. Use 1 pod every 3 day.  and 1 unit for 30 g carb bolus, Disp: 30 each, Rfl: 3  •  insulin lispro (humaLOG, ADMELOG) 100 UNIT/ML injection, USE IN PUMP 0.45 UNITS PER HOUR BASAL DOSE WITH 2 UNITS PER 30 GRAMS OF CARB BOLUS, WITH MEALS OR SNACKS.  MAXIMUM 45 UNITS PER DAY, Disp: 20 mL, Rfl: 2  •  MAGNESIUM GLYCINATE PLUS PO, Take 1 tablet by mouth Daily., Disp: , Rfl:   •  Medium Chain Triglycerides (MCT OIL PO), Take 15 mL by mouth Daily., Disp: , Rfl:   •  Multiple Vitamins-Minerals (AQUADEKS PO), Take  by mouth., Disp: , Rfl:   •  OLANZapine (zyPREXA) 2.5 MG tablet, Take 1 tablet by mouth Every Night., Disp: 90 tablet, Rfl: 0  •  ondansetron (ZOFRAN) 4 MG tablet, Take 1 tablet by mouth Every 8 (Eight) Hours As Needed for Nausea or Vomiting., Disp: 60 tablet, Rfl: 0  •  pancrelipase, Lip-Prot-Amyl, (Creon) 46876-03675 units capsule delayed-release particles capsule, TAKE 4 TO 5 CAPSULES BY MOUTH THREE TIMES A DAY WITH MEALS AND 2 TO 3 CAPSULES WITH SNACK, Disp: 720 capsule, Rfl: 3  •  pantoprazole (PROTONIX) 40 MG EC tablet, TAKE ONE TABLET BY MOUTH  EVERY MORNING ON AN EMPTY STOMACH. WAIT AT LEAST 30 MINUTES TO 1 HOUR BEFORE EATING, Disp: 90 tablet, Rfl: 0  •  Probiotic Product (PROBIOTIC DAILY PO), Take  by mouth., Disp: , Rfl:   •  riFAXIMin (Xifaxan) 550 MG tablet, Take 550 mg by mouth As Needed., Disp: , Rfl:   •  sulfamethoxazole-trimethoprim (Bactrim DS) 800-160 MG per tablet, Take 1 tablet by mouth 2 (Two) Times a Day., Disp: 20 tablet, Rfl: 0  •  tretinoin (Retin-A) 0.05 % cream, Apply  topically to the appropriate area as directed Every Night., Disp: 20 g, Rfl: 2  •  Unable to find, Take 500 mg by mouth Daily. Bacopa, Disp: , Rfl:   •  Unable to find, Take 1 each by mouth Daily. Med Name:Ashwaghanda, Disp: , Rfl:   •  Unable to find, 1 each 1 (One) Time. Med Name: Triphala, Disp: , Rfl:   •  Unable to find, Take 1 each by mouth Daily. Med Name: Calcium, Disp: , Rfl:   •  vitamin C (ASCORBIC ACID) 500 MG tablet, Take 500 mg by mouth Daily., Disp: , Rfl:   •  vitamin D (ERGOCALCIFEROL) 49295 UNITS capsule capsule, TAKE ONE CAPSULE BY MOUTH EVERY 7 DAYS, Disp: 4 capsule, Rfl: 0  •  [START ON 6/23/2021] dronabinol (MARINOL) 5 MG capsule, Take 1 capsule by mouth 2 (Two) Times a Day Before Meals for 90 days., Disp: 180 capsule, Rfl: 0    Past Surgical History:   Procedure Laterality Date   • ABDOMINAL SURGERY      x 10   • CERVICAL DISCECTOMY ANTERIOR  1997    ACDF w/ fusion -Dr. Jerald Lopez   • CHOLECYSTECTOMY     • CHOLECYSTECTOMY     • COLONOSCOPY     • COSMETIC SURGERY      Rhinoplasty   • GASTRIC STIMULATOR IMPLANT SURGERY  12/17/2018    [facility]   • HERNIA MESH REMOVAL  2014    abdominal hernia with mesh    • HYSTERECTOMY     • OTHER SURGICAL HISTORY      duodeum removed   • OTHER SURGICAL HISTORY      jejunem removed   • PANCREATECTOMY     • SMALL INTESTINE SURGERY  12/1/2011    Tp-ait   • SPINAL CORD STIMULATOR IMPLANT N/A 5/10/2021    Procedure: SPINAL CORD STIMULATOR INSERTION;  Surgeon: Kedar Estevez MD;  Location: [facility]ending sale to [facility] OR;   Service: Neurosurgery;  Laterality: N/A;   • TOTAL ABDOMINAL HYSTERECTOMY WITH SALPINGO OOPHORECTOMY     • TUBAL ABDOMINAL LIGATION     • UPPER GASTROINTESTINAL ENDOSCOPY  10/25/2017    Dr Mehta, gastritis, eosinphils in esophagus       Social History     Socioeconomic History   • Marital status: Single     Spouse name: Not on file   • Number of children: Not on file   • Years of education: Not on file   • Highest education level: Not on file   Tobacco Use   • Smoking status: Never Smoker   • Smokeless tobacco: Never Used   Vaping Use   • Vaping Use: Never used   Substance and Sexual Activity   • Alcohol use: No   • Drug use: No   • Sexual activity: Not Currently     Partners: Male     Birth control/protection: Post-menopausal         Review of Systems   Constitutional: Positive for fatigue. Negative for activity change, appetite change, chills, diaphoresis, fever and unexpected weight change.   HENT: Negative for congestion, dental problem, drooling, ear discharge, ear pain, facial swelling, hearing loss, mouth sores, nosebleeds, postnasal drip, rhinorrhea, sinus pressure, sinus pain, sneezing, sore throat, tinnitus, trouble swallowing and voice change.    Eyes: Negative for photophobia, pain, discharge, redness, itching and visual disturbance.   Respiratory: Negative for apnea, cough, choking, chest tightness, shortness of breath, wheezing and stridor.    Cardiovascular: Negative for chest pain, palpitations and leg swelling.   Gastrointestinal: Positive for abdominal distention, abdominal pain, constipation, diarrhea, nausea and vomiting. Negative for anal bleeding, blood in stool and rectal pain.   Endocrine: Positive for heat intolerance. Negative for cold intolerance, polydipsia, polyphagia and polyuria.   Genitourinary: Negative for decreased urine volume, difficulty urinating, dysuria, enuresis, flank pain, frequency, genital sores, hematuria and urgency.   Musculoskeletal: Negative for arthralgias, back  "pain, gait problem, joint swelling, myalgias, neck pain and neck stiffness.   Skin: Negative for color change, pallor, rash and wound.   Allergic/Immunologic: Negative for environmental allergies, food allergies and immunocompromised state.   Neurological: Negative for dizziness, tremors, seizures, syncope, facial asymmetry, speech difficulty, weakness, light-headedness, numbness and headaches.   Hematological: Negative for adenopathy. Does not bruise/bleed easily.   Psychiatric/Behavioral: Negative for agitation, behavioral problems, confusion, decreased concentration, dysphoric mood, hallucinations, self-injury, sleep disturbance and suicidal ideas. The patient is not nervous/anxious and is not hyperactive.    All other systems reviewed and are negative.      Objective   Vital Signs: Pulse 111, temperature 97.5 °F (36.4 °C), height 163.8 cm (64.49\"), weight 67 kg (147 lb 9.6 oz), SpO2 94 %.  Physical Exam  Skin:     Comments: Back incision is intact and healing nicely.  No signs or symptoms of infection.  Left buttock incision is intact and also healing nicely without signs or symptoms of infection         Assessment/Plan   Diagnosis: Chronic low back and abdominal pain status post T7 laminotomy for placement of Medtronic epidural spinal cord stimulator    Medical Decision Making: Patient is doing well.  She has some incisional tenderness.  I reassured her with time this will improve.  She may slowly work back into her normal activities.  The Medtronic rep is here today to program her device.  She will continue to follow-up with Dr. Kumar's office.  I will be happy to see her on as-needed basis.    Diagnoses and all orders for this visit:    1. Intractable abdominal pain (Primary)    2. Mechanical back pain                        Patient's Body mass index is 24.95 kg/m². indicating that she is within normal range (BMI 18.5-24.9). No BMI management plan needed..         Tatiana Stanford PA-C  Patient Care " Team:  Karla Torres MD as PCP - General (Family Medicine)  Kiko Mehta MD as Consulting Physician (Gastroenterology)  Isai Henry Jr., MD (Transplant Surgery)  Ray Clark MD (Endocrinology)  Clarissa Goldman APRN as Nurse Practitioner (Psychiatry)  Maycol Govea, PhD (Psychology)  Jade Toro MD as Consulting Physician (Hospice and Palliative Medicine)  Eliseo Kumar MD as Consulting Physician (Pain Medicine)  Kedar Estevez MD as Surgeon (Neurosurgery)  Betty Mathis DO as Consulting Physician (Endocrinology)

## 2021-06-15 ENCOUNTER — TELEMEDICINE (OUTPATIENT)
Dept: PALLIATIVE CARE | Facility: CLINIC | Age: 61
End: 2021-06-15

## 2021-06-15 DIAGNOSIS — G89.29 CHRONIC ABDOMINAL PAIN: ICD-10-CM

## 2021-06-15 DIAGNOSIS — K31.84 DIABETIC GASTROPARESIS ASSOCIATED WITH TYPE 1 DIABETES MELLITUS (HCC): Primary | ICD-10-CM

## 2021-06-15 DIAGNOSIS — E10.43 DIABETIC GASTROPARESIS ASSOCIATED WITH TYPE 1 DIABETES MELLITUS (HCC): Primary | ICD-10-CM

## 2021-06-15 DIAGNOSIS — R10.9 CHRONIC ABDOMINAL PAIN: ICD-10-CM

## 2021-06-15 PROCEDURE — 99443 PR PHYS/QHP TELEPHONE EVALUATION 21-30 MIN: CPT | Performed by: INTERNAL MEDICINE

## 2021-06-15 NOTE — PROGRESS NOTES
Referring provider: No ref. provider found     06/15/2021    This provider note, based on phone call or other Telemedicine alternative, was created to either supplement or replace provision of in-person palliative care services by a provider (physician or nurse practitioner).  Patient verbally consented to this telehealth encounter.       Subjective   Renée Laurent is a 60 y.o. female with medical history of T1DM, gastroparesis s/p permanent gastric pacemaker placement on 12/17/2018. Also with h/o anxiety, depression.  She has had chronic abdominal pain since she was a teenager, with acute worsening in 2005 associated with significant weight loss.  Seen today during telehealth encounter for palliative care, chronic pain and symptom management follow-up.    Since her last appointment, she has had implantation of a spinal stimulator with Dr. Tabor on 5/10/2020.  She reports improvement of her pain, and reports that interventional team continues to uptitrate settings.  She continues with Butrans, she takes twice daily.  She feels ready to be more physically active and get out of the house, also feels that she can go on a trip to [place] to visit a friend as well as will likely be able to return to face-to-face encounters.      PMH Summary: She was diagnosed with pancreatitis and in fact was found to have pancreatic divisum.  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  Her most troublesome symptom now is gastroparesis. She failed metoclopramide therapy due to development of Parkinsonian symptoms. She underwent placement of gastic pacemaker placement in 12/17/2018 though unfortunately has continued to have some gastroparesis. Pain: complains of pain due to chronic pancreatitis, nerve pain due to nerve damage.  Epigastric bloating pain and dull ache.  Impairs sleep - sleeps only 30 min at a time then up again due to pain.  Limits her activity outside of  home. As of 2020, does not even leave home weekly.  Many missed medical appts due to intractable nausea +/- emesis and pain.  Has been on opioid therapy, Gabapentin, Pristiq, has undergone neurolytic interventions for abdominal pain.  Has concurrent chronic R arm radicular pain and cervical neck pain s/p diskectomy/fusion/rhizotomy. However, the debilitating pain is of the abdomen with nausea.    The following portions of the patient's history were reviewed and updated as appropriate: allergies, current medications, past family history, past medical history, past social history, past surgical history and problem list.    ECOG: (2) Ambulatory and capable of self care, unable to carry out work activity, up and about > 50% or waking hours    Palliative Performance Scale Score: 60%          Objective   There were no vitals taken for this visit.  Physical Exam  General:  Well-kempt, hair fixed, appears to be wearing light makeup  HEENT;  Anicteric  PULM:  No cough, no breathlessness with conversation  Neuro:  Alert, normal speech, no apparent cognitive deficit  Psych:  Normal affect, normal speech and tone    Medication Counts:  Reviewed. See RN note. Virtual visit; counts appropriate  YADI:  Reviewed.  No concerns.  Consistent with history.  Prescribers identified as members of care team.   UDS: Reviewed.        Assessment/Plan   1. Chronic abdominal pain  Discussed medication management at length, now with the addition of spinal cord stimulator anticipate that we will be able start to most reduce Belbuca at next encounter.  For now, ongoing adjustments of spinal cord stimulator which is helping patient to be more physically active  - Buprenorphine HCl (Belbuca) 450 MCG film; Apply 1 film to cheek 2 (two) times a day for 28 days.  Dispense: 56 film; Refill: 0    2. Diabetic gastroparesis associated with type 1 diabetes mellitus (CMS/[facility])  No shaded with chronic abdominal pain as above         I spent 40 minutes caring  wanda Chinchilla on this date of service. This time includes time spent by me in the following activities: preparing for the visit, reviewing tests, obtaining and/or reviewing a separately obtained history, performing a medically appropriate examination and/or evaluation , counseling and educating the patient/family/caregiver, ordering medications, tests, or procedures, referring and communicating with other health care professionals , documenting information in the medical record, independently interpreting results and communicating that information with the patient/family/caregiver and care coordination    No follow-ups on file.    Medical Complexity Decision Making:   High risk prescription medication requiring monitoring for adverse effects including PDMP review, UDT trend review, medication counts  Serious illness with >2 symptom burden addressed.

## 2021-06-15 NOTE — PROGRESS NOTES
You have chosen to receive care through a telehealth visit.  Do you consent to use a video/audio connection for your medical care today? Yes    Med Counts  Medication Filled # Filled Count Used  # days KASSIE Cramer 450mcg  5/23/21 60 15 45 23 1.9                                                                             '

## 2021-06-19 DIAGNOSIS — G89.29 CHRONIC ABDOMINAL PAIN: ICD-10-CM

## 2021-06-19 DIAGNOSIS — R10.9 CHRONIC ABDOMINAL PAIN: ICD-10-CM

## 2021-06-22 RX ORDER — BUPRENORPHINE HYDROCHLORIDE 450 UG/1
FILM, SOLUBLE BUCCAL
Qty: 60 FILM | Refills: 0 | Status: SHIPPED | OUTPATIENT
Start: 2021-06-22 | End: 2021-07-20 | Stop reason: SDUPTHER

## 2021-06-22 RX ORDER — BUPRENORPHINE HYDROCHLORIDE 450 UG/1
450 FILM, SOLUBLE BUCCAL 2 TIMES DAILY
Qty: 56 FILM | Refills: 0 | Status: SHIPPED | OUTPATIENT
Start: 2021-06-22 | End: 2021-07-20

## 2021-07-19 ENCOUNTER — APPOINTMENT (OUTPATIENT)
Dept: BONE DENSITY | Facility: HOSPITAL | Age: 61
End: 2021-07-19

## 2021-07-19 ENCOUNTER — HOSPITAL ENCOUNTER (OUTPATIENT)
Dept: MAMMOGRAPHY | Facility: HOSPITAL | Age: 61
End: 2021-07-19

## 2021-07-20 DIAGNOSIS — G89.29 CHRONIC ABDOMINAL PAIN: ICD-10-CM

## 2021-07-20 DIAGNOSIS — R10.9 CHRONIC ABDOMINAL PAIN: ICD-10-CM

## 2021-07-20 RX ORDER — BUPRENORPHINE HYDROCHLORIDE 450 UG/1
1 FILM, SOLUBLE BUCCAL 2 TIMES DAILY
Qty: 60 FILM | Refills: 0 | Status: SHIPPED | OUTPATIENT
Start: 2021-07-20 | End: 2021-08-18 | Stop reason: SDUPTHER

## 2021-07-23 DIAGNOSIS — E10.9 DIABETES MELLITUS TYPE 1, CONTROLLED, INSULIN DEPENDENT (HCC): ICD-10-CM

## 2021-07-24 ENCOUNTER — PATIENT MESSAGE (OUTPATIENT)
Dept: INTERNAL MEDICINE | Facility: CLINIC | Age: 61
End: 2021-07-24

## 2021-07-24 DIAGNOSIS — E10.43: Primary | ICD-10-CM

## 2021-07-24 DIAGNOSIS — Z87.19: ICD-10-CM

## 2021-07-24 DIAGNOSIS — Z90.410 ACQUIRED TOTAL ABSENCE OF PANCREAS: ICD-10-CM

## 2021-07-24 DIAGNOSIS — E10.43 WELL CONTROLLED TYPE 1 DIABETES MELLITUS WITH GASTROPARESIS (HCC): ICD-10-CM

## 2021-07-24 DIAGNOSIS — K31.84 WELL CONTROLLED TYPE 1 DIABETES MELLITUS WITH GASTROPARESIS (HCC): ICD-10-CM

## 2021-07-24 DIAGNOSIS — E13.9 POST-PANCREATECTOMY DIABETES (HCC): ICD-10-CM

## 2021-07-24 DIAGNOSIS — E89.1 POST-PANCREATECTOMY DIABETES (HCC): ICD-10-CM

## 2021-07-24 DIAGNOSIS — Z90.410 POST-PANCREATECTOMY DIABETES (HCC): ICD-10-CM

## 2021-07-26 NOTE — TELEPHONE ENCOUNTER
From: Renée Laurent  To: Karla Torres MD  Sent: 7/24/2021 2:03 AM EDT  Subject: Non-Urgent Medical Question    Dr. Torres,    I’m continuing to lose a lot of hair due to my GI issues and inability to absorb nutrients and I’m wondering if a prescription for Rogaine (Minoxidil) for women would be beneficial. There is Rogaine w/ 5% Minoxidil available OTC, but is there a prescription that has a higher percentage? If so, would you be able to send a script to [facility] for me?    Thanks so much.     Renée

## 2021-08-17 RX ORDER — OLANZAPINE 2.5 MG/1
TABLET ORAL
Qty: 90 TABLET | Refills: 0 | Status: SHIPPED | OUTPATIENT
Start: 2021-08-17 | End: 2022-01-03 | Stop reason: DRUGHIGH

## 2021-08-18 DIAGNOSIS — R10.9 CHRONIC ABDOMINAL PAIN: ICD-10-CM

## 2021-08-18 DIAGNOSIS — G89.29 CHRONIC ABDOMINAL PAIN: ICD-10-CM

## 2021-08-18 NOTE — TELEPHONE ENCOUNTER
LV: 04/22/2021-telehealth  NV: not scheduled-sent patient MyChart message asking to schedule an appointment.

## 2021-08-19 RX ORDER — BUPRENORPHINE HYDROCHLORIDE 450 UG/1
1 FILM, SOLUBLE BUCCAL 2 TIMES DAILY
Qty: 60 FILM | Refills: 0 | Status: SHIPPED | OUTPATIENT
Start: 2021-08-20 | End: 2021-09-20 | Stop reason: SDUPTHER

## 2021-08-20 RX ORDER — PANTOPRAZOLE SODIUM 40 MG/1
TABLET, DELAYED RELEASE ORAL
Qty: 30 TABLET | Refills: 0 | Status: SHIPPED | OUTPATIENT
Start: 2021-08-20 | End: 2021-09-16

## 2021-08-20 NOTE — TELEPHONE ENCOUNTER
Patient read message on [facility] regarding scheduling an appointment. I did send over 30 days worth of medication and wrote note on prescription that no refills will be sent in until appointment has been made.

## 2021-08-25 DIAGNOSIS — Z51.81 THERAPEUTIC DRUG MONITORING: Primary | ICD-10-CM

## 2021-08-26 ENCOUNTER — TELEMEDICINE (OUTPATIENT)
Dept: PALLIATIVE CARE | Facility: CLINIC | Age: 61
End: 2021-08-26

## 2021-08-26 DIAGNOSIS — E10.43 DIABETIC GASTROPARESIS ASSOCIATED WITH TYPE 1 DIABETES MELLITUS (HCC): Primary | ICD-10-CM

## 2021-08-26 DIAGNOSIS — K31.84 DIABETIC GASTROPARESIS ASSOCIATED WITH TYPE 1 DIABETES MELLITUS (HCC): Primary | ICD-10-CM

## 2021-08-26 DIAGNOSIS — R10.30 LOWER ABDOMINAL PAIN: ICD-10-CM

## 2021-08-26 DIAGNOSIS — K59.03 CONSTIPATION DUE TO OPIOID THERAPY: ICD-10-CM

## 2021-08-26 DIAGNOSIS — K31.84 GASTROPARESIS: ICD-10-CM

## 2021-08-26 DIAGNOSIS — T40.2X5A CONSTIPATION DUE TO OPIOID THERAPY: ICD-10-CM

## 2021-08-26 PROCEDURE — 99443 PR PHYS/QHP TELEPHONE EVALUATION 21-30 MIN: CPT | Performed by: INTERNAL MEDICINE

## 2021-08-26 NOTE — PROGRESS NOTES
Referring provider: No ref. provider found     08/26/2021    Subjective   You have chosen to receive care through a telehealth visit.  Do you consent to use a video/audio connection for your medical care today? Yes     Chief Complaint  Renée Laurent is a 60 y.o. female who presents via video-conference with medical history of T1DM, gastroparesis s/p permanent gastric pacemaker placement on 12/17/2018. Also with h/o anxiety, depression.  She has had chronic abdominal pain since she was a teenager, with acute worsening in 2005 associated with significant weight loss.  Seen today during telehealth encounter for palliative care, chronic pain and symptom management follow-up.     She has had implantation of a spinal stimulator with Dr. Kumar on 5/10/2020.  She reports improvement of her pain, and reports that interventional team continues to uptitrate settings.  She estimates that spinal stimulator has helped with her pain approximately 50%, which is a huge improvement. She continues with Belbuca, she takes twice daily.    She recently went on a trip to [place], and was able to bike 8 miles a day.  Recently refilled Butrans patch, anticipating appointment with Dr. Kumar in coming weeks, will likely continue to uptitrate titrate stimulator.       PMH Summary: She was diagnosed with pancreatitis and in fact was found to have pancreatic divisum.  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  Her most troublesome symptom now is gastroparesis. She failed metoclopramide therapy due to development of Parkinsonian symptoms. She underwent placement of gastic pacemaker placement in 12/17/2018 though unfortunately has continued to have some gastroparesis. Pain: complains of pain due to chronic pancreatitis, nerve pain due to nerve damage.  Epigastric bloating pain and dull ache.  Impairs sleep - sleeps only 30 min at a time then up again due to pain.  Limits her  activity outside of home. As of 2020, does not even leave home weekly.  Many missed medical appts due to intractable nausea +/- emesis and pain.  Has been on opioid therapy, Gabapentin, Pristiq, has undergone neurolytic interventions for abdominal pain.  Has concurrent chronic R arm radicular pain and cervical neck pain s/p diskectomy/fusion/rhizotomy. However, the debilitating pain is of the abdomen with nausea.      The following portions of the patient's history were reviewed and updated as appropriate: allergies, current medications, past family history, past medical history, past social history, past surgical history and problem list.    ECOG: (1) Restricted in physically strenuous activity, ambulatory and able to do work of light nature    Palliative Performance Scale Score: 70%          Objective    General: Well-appearing female sitting pacing computer video camera in no apparent distress  Pulmonary: normal respiratory effort  Psych: Normal mood and affect    Medication Counts:  Reviewed. See RN note. Over the phone count appropriate  YADI:  Reviewed.  No concerns.  Consistent with history.  Prescribers identified as members of care team.   UDS: Reviewed. Appropriate       Assessment/Plan   1. Diabetic gastroparesis associated with type 1 diabetes mellitus (CMS/HCC)  Follows closely with endocrinologist    2. Lower abdominal pain  Improving control with spinal cord stimulator as well as Belbuca patch.  Discussed possible titration off Belbuca.  Discussed that at time of next refill no bleeding under a month's time that she should call and discuss how she is feeling.  Could consider reducing dose to 300 MCG's twice daily from 450 MCG's twice daily if pain is stable or improved.  Scheduled one month follow-up so can meet quickly after refills to see if pain continues to    3.  Chronic opiate-induced constipation well-controlled, every other day bowel movements         I spent 30 minutes caring for Renée on  this date of service. This time includes time spent by me in the following activities: preparing for the visit, reviewing tests, obtaining and/or reviewing a separately obtained history, performing a medically appropriate examination and/or evaluation , counseling and educating the patient/family/caregiver, ordering medications, tests, or procedures, referring and communicating with other health care professionals , documenting information in the medical record, independently interpreting results and communicating that information with the patient/family/caregiver and care coordination    Return in about 4 weeks (around 9/23/2021) for Video visit.    Medical Complexity Decision Making:   High risk prescription medication requiring monitoring for adverse effects including PDMP review, UDT trend review, medication counts  Serious illness with >2 symptom burden addressed.

## 2021-08-31 ENCOUNTER — APPOINTMENT (OUTPATIENT)
Dept: NUTRITION | Facility: HOSPITAL | Age: 61
End: 2021-08-31

## 2021-09-08 ENCOUNTER — PATIENT MESSAGE (OUTPATIENT)
Dept: INTERNAL MEDICINE | Facility: CLINIC | Age: 61
End: 2021-09-08

## 2021-09-08 DIAGNOSIS — E10.43 DIABETIC GASTROPARESIS ASSOCIATED WITH TYPE 1 DIABETES MELLITUS (HCC): Primary | ICD-10-CM

## 2021-09-08 DIAGNOSIS — K31.84 DIABETIC GASTROPARESIS ASSOCIATED WITH TYPE 1 DIABETES MELLITUS (HCC): Primary | ICD-10-CM

## 2021-09-08 DIAGNOSIS — Z90.410 ABSENCE OF PANCREAS, ACQUIRED: ICD-10-CM

## 2021-09-08 NOTE — TELEPHONE ENCOUNTER
Herman, April DANIEL RENDON 9/8/2021 12:39 PM EDT      ----- Message -----  From: Renée Laurent  Sent: 9/8/2021 12:19 PM EDT  To: [facility]  Clinical Pool  Subject: Referral Request     Dr. Torres,    I was seeing Dr. Avelar (dr. Jade Toro before she left) with Psychiatric Palliative Care through [facility], but was told she’s no longer with them and  has  from Psychiatric Palliative Care. Someone was supposed to call me to schedule a follow up appointment, but I haven’t heard from anyone and my Belbuca (for pain) which was prescribed through Dr. Avelar will need to be refilled on 9/23. Can you refer me to another palliative care doctor so there’s no lapse in treatment? I don’t know who to call to schedule a video visit.     Thanks so much.     Renée Laurent

## 2021-09-12 DIAGNOSIS — F41.9 ANXIETY: ICD-10-CM

## 2021-09-12 DIAGNOSIS — R63.0 DECREASE IN APPETITE: ICD-10-CM

## 2021-09-13 RX ORDER — DRONABINOL 5 MG/1
5 CAPSULE ORAL
Qty: 180 CAPSULE | Refills: 0 | Status: SHIPPED | OUTPATIENT
Start: 2021-09-13 | End: 2021-10-07

## 2021-09-13 RX ORDER — DRONABINOL 5 MG/1
5 CAPSULE ORAL
Qty: 180 CAPSULE | Refills: 0 | Status: SHIPPED | OUTPATIENT
Start: 2021-09-13 | End: 2021-09-13 | Stop reason: SDUPTHER

## 2021-09-14 RX ORDER — BUSPIRONE HYDROCHLORIDE 5 MG/1
TABLET ORAL
Qty: 60 TABLET | Refills: 0 | Status: SHIPPED | OUTPATIENT
Start: 2021-09-14 | End: 2021-10-22 | Stop reason: SDUPTHER

## 2021-09-15 ENCOUNTER — PATIENT MESSAGE (OUTPATIENT)
Dept: INTERNAL MEDICINE | Facility: CLINIC | Age: 61
End: 2021-09-15

## 2021-09-16 DIAGNOSIS — G89.29 CHRONIC ABDOMINAL PAIN: ICD-10-CM

## 2021-09-16 DIAGNOSIS — R10.9 CHRONIC ABDOMINAL PAIN: ICD-10-CM

## 2021-09-16 RX ORDER — PANTOPRAZOLE SODIUM 40 MG/1
TABLET, DELAYED RELEASE ORAL
Qty: 30 TABLET | Refills: 0 | Status: SHIPPED | OUTPATIENT
Start: 2021-09-16 | End: 2021-10-22 | Stop reason: SDUPTHER

## 2021-09-16 RX ORDER — BUPRENORPHINE HYDROCHLORIDE 450 UG/1
1 FILM, SOLUBLE BUCCAL 2 TIMES DAILY
Qty: 60 FILM | Refills: 0 | OUTPATIENT
Start: 2021-09-16

## 2021-09-16 NOTE — TELEPHONE ENCOUNTER
Sil Melendez MA 9/15/2021 1:06 PM EDT      ----- Message -----  From: Renée Laurent  Sent: 9/15/2021 12:48 PM EDT  To: [facility] Clinical Pool  Subject: Prescription Question     Dr. Torres,  I doubt I’m going to get an appointment with my new palliative care doctor before 9/21 when my script for Belbuca (450mcg) needs to be refilled. Can you take care of renewing this script for me so I’m not without any effective pain control until I’m seen by someone in palliative care? I’d rather not deal with any withdrawal symptoms at this point. Thanks so much.     Renée

## 2021-09-16 NOTE — TELEPHONE ENCOUNTER
After speaking to referral coordinator, I called pt to let her know that Palliative Care will be calling her this afternoon.  She stated understanding

## 2021-09-16 NOTE — TELEPHONE ENCOUNTER
adri takes a special SARAN which I do not have, this would have to come from palliative care or pain doctor that prescribes suboxone

## 2021-09-16 NOTE — TELEPHONE ENCOUNTER
Caller: Renée Laurent    Relationship: Self    Best call back number: [phone]     Medication needed:   Requested Prescriptions     Pending Prescriptions Disp Refills   • Buprenorphine HCl (Belbuca) 450 MCG film 60 film 0     Sig: Apply 1 film to cheek 2 (two) times a day.       When do you need the refill by: ASAP     What additional details did the patient provide when requesting the medication: WILL BE OUT 9/21/21. PATIENT WOULD LIKE A CALLBACK TO KNOW IF PCP CAN REFILL THIS PRESCRIPTION. PATIENT STATES SHE HAS NOT YET BEEN ASSIGNED A NEW PALLIATIVE CARE PROVIDER AND WILL NEED PCP TO FILL THIS PRESCRIPTION IN THE MEANTIME. PATIENT IS ALSO REQUESTING AN UPDATE ON HER REFERRAL AS WELL.    Does the patient have less than a 3 day supply:  [] Yes  [x] No    What is the patient's preferred pharmacy: [facility] [address] [place] [address] [address] [place] [address] [address] AT [facility] & [address] - [phone]  - [phone] FX

## 2021-09-20 DIAGNOSIS — R10.9 CHRONIC ABDOMINAL PAIN: ICD-10-CM

## 2021-09-20 DIAGNOSIS — G89.29 CHRONIC ABDOMINAL PAIN: ICD-10-CM

## 2021-09-21 RX ORDER — BUPRENORPHINE HYDROCHLORIDE 450 UG/1
1 FILM, SOLUBLE BUCCAL 2 TIMES DAILY
Qty: 60 FILM | Refills: 0 | Status: SHIPPED | OUTPATIENT
Start: 2021-09-21 | End: 2021-09-28 | Stop reason: SDUPTHER

## 2021-09-21 NOTE — TELEPHONE ENCOUNTER
Will refill prescription for Belbuca today. Patient is scheduled for follow up visit on 9/23/21. Will discuss taper at that time.

## 2021-09-24 ENCOUNTER — TELEPHONE (OUTPATIENT)
Dept: ONCOLOGY | Facility: CLINIC | Age: 61
End: 2021-09-24

## 2021-09-24 NOTE — TELEPHONE ENCOUNTER
[facility] REPRESENTATIVE CALLED TO REQUEST A MEDICATION APPEAL AND CLARIFICATION REQUEST ON MARINOL. PLEASE REACH THE APPEAL DEPARTMENT AT P: [phone] EXT. [phone]. REFERENCE #: [id number]. PLEASE ADVISE.

## 2021-09-27 NOTE — PROGRESS NOTES
Palliative Clinic Note      Name: Renée Laurent  Age: 60 y.o.  Sex: female  : 1960  MRN: [id number]  Date of Service: 21  Referring Physician: No ref. provider found    Subjective:    You have chosen to receive care through a telephone visit. Do you consent to use a telephone visit for your medical care today? Yes    Chief Complaint: Chronic abdominal pain    History of Present Illness: Renée Laurent is a 60 y.o. female with past medical history significant for gastroparesis, chronic pancreatitis, post pancreatectomy diabetes, status post splenectomy, hyperlipidemia, asthma, GERD, and mood disorder who presents via videochat today as a follow up for pain and symptom management.     Treatment summary: History of chronic pancreatitis status post pancreatectomy in  with autotransplantation of the islets.  Also suffers from gastroparesis with placement of gastric pacemaker in 2018.  Unfortunately, the abdominal pain, bloating, nausea/vomiting and weight loss continued despite intervention.  Symptoms limit activity outside of home and have caused her to miss many medical appointments in the past.  Concurrent history of chronic cervical neck pain status post discectomy, fusion, and rhizotomy.  Recent implantation of a spinal stimulator by Dr. Kumar in  with 60-70% improvement in pain.    Symptoms: Patient reports worsening nausea, vomiting and abdominal pain over the past 10 days.  She states her GI symptoms flare up intermittently.  She was referred to a dietitian by her PCP for malabsorption/malnutrition.  She has an appointment next week and is hoping to discuss other feeding options including a feeding tube.  Her nausea and vomiting is somewhat controlled with Zofran and Marinol.  Her current pain regimen includes Belbuca 450 mcg twice daily.  She has a follow-up appointment with her pain specialist on 10/18/2021.  She is anticipating them to titrate up the stimulator  in hopes she can taper the Belbuca.  She is not comfortable starting a taper at this time.  She states her pain is as high as an 8 out of 10 in the afternoon.  She feels her mood has been slightly worse than baseline which she attributes to not beening able to leave the house for over a week.  Patient reports interrupted sleep secondary to pain and nausea/vomiting.  She has tried Ambien in the past but had issues with sleepwalking. Bowel movements are regular.      Pyschosocial: Recent flareup of GI symptoms has had a negative impact on quality of life.    Goals: Hoping to taper the Belbuca once spinal stimulator has been optimized.     The following portions of the patient's history were reviewed and updated as appropriate: allergies, current medications, past family history, past medical history, past social history, past surgical history and problem list.    Decisional capacity: Full  ECOG: (1) Restricted in physically strenuous activity, ambulatory and able to do work of light nature     Objective:    Constitutional: Awake, alert, sitting up   Eyes: PERRLA, EOMS intact  HENT: NCAT, face symmetric  Neck: Supple, trachea midline  Respiratory: Nonlabored respirations  Musculoskeletal: Moves all extremities   Psychiatric: Appropriate affect, cooperative  Neurologic: Oriented x 3, Cranial Nerves grossly intact to confrontation, speech clear    Medication Counts: Obtained over the phone. See bottom of note for details. No misuse or overuse evident.   [facility]:  #[id number]. Reviewed. All providers are part of the care team.  UDS(Y): Last 9/24/20. Reviewed. Appropriate. Obtain at next appointment.    Assessment & Plan:    1. Gastroparesis  -Prior authorization appeal denied for Marinol.  Will reach out to PCP for assistance with this.    2. Chronic abdominal pain  -Continue Belbuca 450 mcg twice daily.  Will need refill on 10/20/2021.  Patient scheduled for follow-up with pain specialist on 10/18/2021.  Patient hoping  spinal stimulator can be titrated up.  We will continue to discuss Belbuca taper in the future.    3. Insomnia, unspecified type  -Sleep interrupted by pain and nausea.  Patient feels she is only getting 1 hour increments of sleep through the night.  She has tried Ambien in the past but had issues with sleepwalking.  Will discuss other sleep aids at next visit.  Patient continues meditation and sleep hygiene.    4. Therapeutic drug monitoring  -Patient due for yearly urine drug screen.  She agrees to go to a [facility] laboratory in the next month to complete.    Could consider reducing dose to 300 MCG's twice daily from 450 MCG's twice daily if pain is stable or improved    Code status: Full code  Medical interventions: Full  Advanced directives: Defer    Scheduled to follow up via video visit on 10/19/21 at 2:00 PM.    I spent 60 minutes caring for Renée on this date of service. This time includes time spent by me in the following activities: preparing for the visit, reviewing tests, obtaining and/or reviewing a separately obtained history, performing a medically appropriate examination and/or evaluation , counseling and educating the patient/family/caregiver, referring and communicating with other health care professionals , documenting information in the medical record, independently interpreting results and communicating that information with the patient/family/caregiver and care coordination    Riddhi Maddox PA-C  09/28/2021    Medication Date Filled # Filled Count Used # Days  KASSIE   Belbuca 450 9/22/21 60 48 12 6 2

## 2021-09-27 NOTE — PATIENT INSTRUCTIONS
1. Continue Belbuca  2. Scheduled to follow up in 3 weeks.  3. Always bring your medications prescribed by the clinic to every appointment. If telemedicine appointment, be prepared to give and show medication counts. This assists us with managing your refill needs.   4. Call the Palliative Clinic for any questions or concerns at [phone]. Or reach out to us on [facility] via the Palliative Pool.   5. Please give us 2-3 business days in advance for routine refill requests. Prescriptions for controlled medications will take at least 24 hours to be sent to your pharmacy. Be aware of additional insurance prior authorization processing time required for some medications.

## 2021-09-28 ENCOUNTER — TELEMEDICINE (OUTPATIENT)
Dept: PALLIATIVE CARE | Facility: CLINIC | Age: 61
End: 2021-09-28

## 2021-09-28 DIAGNOSIS — G89.29 CHRONIC ABDOMINAL PAIN: ICD-10-CM

## 2021-09-28 DIAGNOSIS — Z51.81 THERAPEUTIC DRUG MONITORING: ICD-10-CM

## 2021-09-28 DIAGNOSIS — K31.84 GASTROPARESIS: Primary | ICD-10-CM

## 2021-09-28 DIAGNOSIS — R10.9 CHRONIC ABDOMINAL PAIN: ICD-10-CM

## 2021-09-28 DIAGNOSIS — G47.00 INSOMNIA, UNSPECIFIED TYPE: ICD-10-CM

## 2021-09-28 PROCEDURE — 99215 OFFICE O/P EST HI 40 MIN: CPT | Performed by: PHYSICIAN ASSISTANT

## 2021-09-28 NOTE — TELEPHONE ENCOUNTER
Riddhi her palliative care provider returned my call. She states that she has been trying to get her marinol approved through the insurance but it has been denied. She states that she was reaching out to us because she noted in the chart we have gotten it approved twice before. Dr. Torres, can you help or do you remember how we got it approved before?     She would like a return call to her office instead of her cell phone. Office number is [phone].

## 2021-09-28 NOTE — TELEPHONE ENCOUNTER
Will refill next prescription of Belbuca 450 mcg twice daily to start on 10/21/21. Follow up appointment scheduled for 10/19/21.

## 2021-09-28 NOTE — TELEPHONE ENCOUNTER
Original message was sent to me via [employer]. She wasn't for sure who Riddhi is at this time. Attempted to contact Riddhi back to get some additional information but had to leave voicemail.     What authorization is being requested? PA for medication or refill? If so who prescribed and which medication?

## 2021-09-28 NOTE — TELEPHONE ENCOUNTER
Patient is being cared for by pallative care and they need some insight on how to obtain an authorization for medication by insurance. The person calling is Riddhi and her cell number is [phone]

## 2021-09-29 NOTE — TELEPHONE ENCOUNTER
I notified Riddhi that we received a fax regarding the denial of p tmedication.  The reason for denial is that their records indicate that this patient no longer has [employer] coverage. She stated understanding

## 2021-10-04 ENCOUNTER — HOSPITAL ENCOUNTER (OUTPATIENT)
Dept: NUTRITION | Facility: HOSPITAL | Age: 61
Setting detail: RECURRING SERIES
Discharge: HOME | End: 2021-10-04

## 2021-10-04 VITALS — HEIGHT: 65 IN | BODY MASS INDEX: 23.32 KG/M2 | WEIGHT: 140 LBS

## 2021-10-04 PROCEDURE — 97802 MEDICAL NUTRITION INDIV IN: CPT | Performed by: DIETITIAN, REGISTERED

## 2021-10-04 NOTE — CONSULTS
Adult Outpatient Nutrition  Assessment/PES    Patient Name:  Renée Laurent  YOB: 1960  MRN: [id number]    Assessment Date:  10/4/2021    Comments:  Telephone nutrition consult, 60 minutes. This medical referred consult was provided as a ZOOM visit, as patient is unable to attend an in-office appointment due to the COVID-19 crisis. Consent for treatment was given verbally.    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 She does additional bolus of 1:30 with meals. She is prescribed Creon with meals, but does state that the cost of this medication is prohibitive. She often does not take a full dose or will skip doses to prolong prescription. Over the last few months she has been experiencing increased nausea and vomiting, as well as general GI intolerance. She has known gastroparesis 2/2 to RnY/duodenom resection and has had stimulator placed several years ago. She states that she has still had flares since placement, but this one is more severe. She is unable to tolerate much beyond simple carbohydrates, and even those may induce vomiting. She states that she is currently having several incidents of hypoglycemia per week as a result of decreased intake and vomiting. She also reports lipid droplets in stool suggesting malabsorption of fat. She is on a standard post-bariatric supplement regimen (b-complex, calcium, iron), but reports dry skin, hair loss, brittle nails and teeth. She has also experienced weight loss of 6# over the last 3 weeks, all of which indicate malnutrition.     At this time it is unclear whether malnutrition is d/t gastroparesis (n/v, poor intake) or malabsorption, or both.  Frequent hypoglycemia is also of great concern. Patient does have appt with endocrinology next week to discuss insulin regimen. She is familiar with rule of 15s for insulin tx. She is also familiar with carbohydrate counting and consistent carbohydrate diet. During the session we discussed the etiology of gastroparesis. RD presented the 3-stage gastroparesis diet to allow provide GI rest and improve symptoms, as well as develop a maintenance plan that will prevent flares.  RD recommends the following timeline: Stage 1 (clear liquid) x 24 hours, Stage 2 (full liquid) x 48 hours and GI soft diet x 5 days. RD then instructed to advance diet to low residue (add cooked vegetables, fresh fruits and some whole grains) until follow up. Maintenance plan will be discussed at follow up. RD did briefly discuss various peptide supplements and/or supplemental tube feeds if oral intake remains poor. Patient does state that she has tried standard supplements (Boost, Ensure) in the past and has not tolerated them well. RD provided a sample of [employer] peptide as well as standard formula. Patient will try and we will discuss regular supplement regimen at follow up.       Goals:  - gastroparesis 3 stage diet  - daily peptide supplement  - maintain blood sugar 70/180 mg/dL    Total of 60 minutes spent with patient on nutrition counseling. Education based on Academy of Dietetics and Nutrition guidelines. Patient was provided with RD's contact information. Follow up visit is scheduled for 10/25/21 at 2:30 pm. Thank you for this referral.      General Info       Row Name 10/04/21 9639       Today's Session    Person(s) attending today's session  Patient     Services Used Today?  No       General Information    How Well Do You Speak English?  very well    Do You Speak a Language Other Than English at Home?  no    Are you able to read and write English?  No            Anthropometrics       Row Name 10/04/21 0647           "Anthropometrics    Height  165.1 cm (65\")     Weight  63.5 kg (140 lb)        Ideal Body Weight (IBW)    Ideal Body Weight (IBW) (kg)  57.29     % Ideal Body Weight  110.85        Usual Body Weight (UBW)    Usual Body Weight  66.7 kg (147 lb)     % Usual Body Weight  95.24     Weight Loss  unintentional     Weight Loss Time Frame  3 weeks        Body Mass Index (BMI)    BMI (kg/m2)  23.35           Nutritional Info/Activity       Row Name 10/04/21 1557       Nutritional Information    Have you had weight changes?  Yes    Describe weight changes  6 lbs x 3 weeks    What is your desired body weight?  65.8 kg (145 lb)    Have you tried to lose weight before?  No    What is your motivation to lose weight?  none maintain or gain    Supplemental Drinks/Foods/Additives  B complex, biotin, probiotic, MVI, calcium, iron,    History of eating disorder?  No    What cultural diet influences are important for you to follow?  none    Do you have difficulty chewing food?  No    Functional Status  able to prepare meals;able to purchase food;ambulatory    List any food cravings/trigger foods you have  none    List any food aversions  none    How often during the day do you find yourself snacking?  2-3 times per day    How often do you eat out and where?  rarely    Do you use Food Assistance programs (WIC, food stamps, food bank)?  no    Do you need information about Food Assistance programs?  no    How many meals do you eat each day?  2    How many snacks do you eat each day?  2    What is the biggest challenge you have with your diet?  Weight maintenance;Food causing negative symptoms    What type of support do you currently use to help you with your health issues?  internet support groups    Enter everything you can remember eating in the last 24 hours (1 day)  Breakfast: romeo latte with fruit, Lunch: cheddar cheese with organic multigrain crackers, salad with vegetables, Snack: banana Dinner: baked chicken, organic green beans, " "potatoes       Eating Environment    Eating environment  Alone       Physical Activity    Are you currently involved in an activity/exercise program?   No          Home Nutrition Report       Row Name 10/04/21 1557          Home Nutrition Report    Diet  Self modified     Supplemental Drinks/Foods/Additives  B complex, biotin, probiotic, MVI, calcium, iron,           Estimated/Assessed Needs       Row Name 10/04/21 1546          Calculation Measurements    Weight Used For Calculations  63.5 kg (140 lb)     Height  165.1 cm (65\")        Estimated/Assessed Needs    Additional Documentation  Potter-St. Jeor Equation (Group)        Potter-St. Jeor Equation    RMR (Potter-St. Jeor Equation)  1195.915     Potter-St. Jeor Activity Factors  1.2     Activity Factors (Potter-St. Jeor)  1435.098             Labs/Tests/Procedures/Meds       Row Name 10/04/21 1651          Labs/Procedures/Meds    Lab Results Reviewed  reviewed        Diagnostic Tests/Procedures    Diagnostic Test/Procedure Reviewed  reviewed        Medications    Pertinent Medications Reviewed  reviewed               Problem/Interventions:  Problem 1       Row Name 10/04/21 1651          Nutrition Diagnoses Problem 1    Problem 1  Altered GI Function     Etiology (related to)  Medical Diagnosis     Gastrointestinal  Gastroparesis;Other (comment) s/p pancreactomy     Signs/Symptoms (evidenced by)  Report/Observation     Reported/Observed By  MD     Reported GI Symptoms  GI distress;Food intolerance;Other (comment) absence of pancreas           Problem 2       Row Name 10/04/21 1652          Nutrition Diagnoses Problem 2    Problem 2  Impaired Nutrient Utilization     Etiology (related to)  Medical Diagnosis     Endocrine  DM Type 1     Signs/Symptoms (evidenced by)  Biochemical     Specific Labs Noted  Glucose                 Intervention Goal       Row Name 10/04/21 1653          Intervention Goal    General  Improved nutrition related lab(s);Nutrition " support treatment;Provide information regarding MNT for treatment/condition;Reduce/improve symptoms     PO  Meet estimated needs     Weight  Maintain weight             Nutrition Prescription       Row Name 10/04/21 1653          Nutrition Prescription PO    PO Prescription  Begin/change diet     Begin/Change Diet to  Regular     Fluid Consistency  Thin     Common Modifiers  GI Soft/[place]     New PO Prescription Ordered?  No, recommended           Education/Evaluation       Row Name 10/04/21 1657          Education    Education  Provided education regarding;Education topics;Advised regarding habits/behavior     Provided education regarding  Avoidance/improvement of symptoms;Avoidance of associated complications;Diet rationale;Healthy eating for diabetes;Key food habit change;Medical diagnosis;Nutrition related factor     Education Topics  Bariatric surgery diet (specify);Diabetes;CHO counting;Fiber;GI disease     CHO (gm/day)  150 gm/day     Advised Regarding Habits/Behavior  Food choices;Eating pattern;Meal planning;Seasoning food;Label reading        Monitor/Evaluation    Monitor  Per protocol     Education Follow-up  Other (comment) 10/25/21             Electronically signed by:  Maggi Rich RD  10/04/21 16:55 EDT

## 2021-10-07 DIAGNOSIS — E10.43 DIABETIC GASTROPARESIS ASSOCIATED WITH TYPE 1 DIABETES MELLITUS (HCC): ICD-10-CM

## 2021-10-07 DIAGNOSIS — K31.84 DIABETIC GASTROPARESIS ASSOCIATED WITH TYPE 1 DIABETES MELLITUS (HCC): ICD-10-CM

## 2021-10-07 DIAGNOSIS — R10.9 INTRACTABLE ABDOMINAL PAIN: Primary | ICD-10-CM

## 2021-10-07 RX ORDER — DRONABINOL 5 MG/1
5 CAPSULE ORAL
Qty: 60 CAPSULE | Refills: 3 | Status: SHIPPED | OUTPATIENT
Start: 2021-10-07 | End: 2021-11-06

## 2021-10-07 RX ORDER — DRONABINOL 5 MG/1
5 CAPSULE ORAL
Qty: 30 CAPSULE | Refills: 3 | Status: SHIPPED | OUTPATIENT
Start: 2021-10-07 | End: 2021-10-07

## 2021-10-07 NOTE — TELEPHONE ENCOUNTER
Placed new prescription for Marinol 5 mg twice daily for 30 days #60 with 3 refills. We were unable to get the medication approved through her insurance. She plans to use [employer].

## 2021-10-08 DIAGNOSIS — E89.1 POST-PANCREATECTOMY DIABETES (HCC): ICD-10-CM

## 2021-10-08 DIAGNOSIS — Z90.410 ACQUIRED TOTAL ABSENCE OF PANCREAS: ICD-10-CM

## 2021-10-08 DIAGNOSIS — Z90.410 POST-PANCREATECTOMY DIABETES (HCC): ICD-10-CM

## 2021-10-08 DIAGNOSIS — E13.9 POST-PANCREATECTOMY DIABETES (HCC): ICD-10-CM

## 2021-10-08 DIAGNOSIS — K91.2 POSTSURGICAL MALABSORPTION: ICD-10-CM

## 2021-10-08 RX ORDER — PANCRELIPASE 24000; 76000; 120000 [USP'U]/1; [USP'U]/1; [USP'U]/1
CAPSULE, DELAYED RELEASE PELLETS ORAL
Qty: 500 CAPSULE | OUTPATIENT
Start: 2021-10-08

## 2021-10-18 NOTE — PROGRESS NOTES
Palliative Clinic Note      Name: Renée Laurent  Age: 60 y.o.  Sex: female  : 1960  MRN: [id number]  Date of Service: 10/19/21  Referring Physician: No ref. provider found  Mode of visit: Video   Location of patient: Home    The patient has chosen to receive care through a telehealth visit.   Does the patient consent to using video/audio connection for their medical care today? Yes   No technical issues occurred during this visit.     Subjective:    Chief Complaint: Nausea, vomiting, abdominal pain    History of Present Illness: Renée Laurent is a 60 y.o. female with past medical history significant for gastroparesis, chronic pancreatitis, post pancreatectomy diabetes, status post splenectomy, hyperlipidemia, asthma, GERD, and mood disorder who presents via videochat today as a follow up for pain and symptom management.     Treatment summary: History of chronic pancreatitis status post pancreatectomy in  with autotransplantation of the islets.  Also suffers from gastroparesis with placement of gastric pacemaker in 2018.  Unfortunately, the abdominal pain, bloating, nausea/vomiting and weight loss continued despite intervention.  Symptoms limit activity outside of home and have caused her to miss many medical appointments in the past.  Concurrent history of chronic cervical neck pain status post discectomy, fusion, and rhizotomy.  Recent implantation of a spinal stimulator by Dr. Kumar in 2020 with 60-70% improvement in pain.    Symptoms: Patient has had persistent nausea, vomiting and abdominal pain related to the gastroparesis.  She recently met with a dietician for the malnutrition and discussed the use of a feeding tube in the future. Patient plans to do more research and speak with others about this.  She uses Zofran and Marinol for the N/V. She was unable to make her pain management follow-up yesterday with Dr. Kumar due to GI symptoms.  This was rescheduled for  "11/16/2021.  Patient has questions about an in-home palliative referral to [facility] placed by her PCP scheduled for 11/17/2021.  Patient is still having difficulty sleeping through the night. She has tried Ambien in the past but had issues with sleepwalking.  Using meditation and sleep hygiene currently.    Pyschosocial: The patient lives alone. GI flareups have a siognificant impact on her quality of life.  She reports feeling overwhelmed and tired of being \"unreliable.\"  Patient has children and grandchildren in [facility] but does not get to see them often due to her illness.     Goals: Hoping to taper the Belbuca once spinal stimulator has been optimized.     The following portions of the patient's history were reviewed and updated as appropriate: allergies, current medications, past family history, past medical history, past social history, past surgical history and problem list.    Decisional capacity: Full  EOG: (2) Ambulatory and capable of self care, unable to carry out work activity, up and about > 50% or waking hours     Objective:    Constitutional: Awake, alert, sitting up   Eyes: PERRLA, EOMS intact  HENT: NCAT, face symmetric  Neck: Supple, trachea midline  Respiratory: Nonlabored respirations  Musculoskeletal: Moves all extremities   Psychiatric: Appropriate affect, cooperative  Neurologic: Oriented x 3, Cranial Nerves grossly intact to confrontation, speech clear    Medication Counts: Collected over the phone. See bottom of note for details. No misuse or overuse evident.   YADI:  #[id number]. Reviewed. All providers are part of the care team.  UDS(Y): Last 9/24/20. Reviewed. Appropriate. Patient due for yearly urine drug screen. Order placed.    Assessment & Plan:    1. Gastroparesis  -Price for Marinol even with good Rx coupon $150.  Patient has some medication at home.    -In-home palliative appointment scheduled for 11/17/2021 with [facility].  Looks like referral was placed in September by " PCP.  I encouraged the patient to keep this appointment and explore all of her options.  I reassured her I will continue to follow her if that is what she prefers however and in-home service may be more appropriate. She will follow up with me on 11/22/21 to discuss this.     2. Lower abdominal pain  -Continue Belbuca 450 mcg twice daily.  Will need refill on 11/19/2021.  Patient rescheduled follow-up with pain specialist on 11/16/2021.  Patient hoping spinal stimulator can be titrated up.  We will continue to discuss Belbuca taper in the future.  Could consider reducing dose to 300 MCG's twice daily from 450 MCG's twice daily if pain is stable or improved.     3. Insomnia, unspecified type  -We will increase dose of Zyprexa to 5 mg nightly. QTc 407 on last ECG 5/7/21.  Adverse reaction to hypnotics in the past.  No efficacy with melatonin or OTC sleep aids.  She will continue meditation and sleep hygiene.    Code status: Full code  Medical interventions: Full  Advanced directives: Defer    No follow-ups on file.    I spent 40 minutes caring for Renée Laurent on this date of service. This time includes time spent by me in the following activities: preparing for the visit, reviewing tests, obtaining and/or reviewing a separately obtained history, performing a medically appropriate examination and/or evaluation , counseling and educating the patient/family/caregiver, ordering medications, tests, or procedures, documenting information in the medical record, independently interpreting results and communicating that information with the patient/family/caregiver, and care coordination    Riddhi Maddox PA-C  10/19/2021    Medication Date Filled # Filled Count Used # Days  KASSIE   Belbuca 450 9/22/21 60 2 58 27 2

## 2021-10-19 ENCOUNTER — PATIENT MESSAGE (OUTPATIENT)
Dept: INTERNAL MEDICINE | Facility: CLINIC | Age: 61
End: 2021-10-19

## 2021-10-19 ENCOUNTER — TELEMEDICINE (OUTPATIENT)
Dept: PALLIATIVE CARE | Facility: CLINIC | Age: 61
End: 2021-10-19

## 2021-10-19 DIAGNOSIS — G47.00 INSOMNIA, UNSPECIFIED TYPE: ICD-10-CM

## 2021-10-19 DIAGNOSIS — G89.29 CHRONIC ABDOMINAL PAIN: ICD-10-CM

## 2021-10-19 DIAGNOSIS — Z59.9 FINANCIAL DIFFICULTIES: Primary | ICD-10-CM

## 2021-10-19 DIAGNOSIS — R10.30 LOWER ABDOMINAL PAIN: ICD-10-CM

## 2021-10-19 DIAGNOSIS — K31.84 GASTROPARESIS: Primary | ICD-10-CM

## 2021-10-19 DIAGNOSIS — R10.9 CHRONIC ABDOMINAL PAIN: ICD-10-CM

## 2021-10-19 PROCEDURE — 99215 OFFICE O/P EST HI 40 MIN: CPT | Performed by: PHYSICIAN ASSISTANT

## 2021-10-19 RX ORDER — OLANZAPINE 5 MG/1
5 TABLET ORAL NIGHTLY
Qty: 28 TABLET | Refills: 0 | Status: SHIPPED | OUTPATIENT
Start: 2021-10-19 | End: 2022-01-03 | Stop reason: SDUPTHER

## 2021-10-19 SDOH — ECONOMIC STABILITY - INCOME SECURITY: PROBLEM RELATED TO HOUSING AND ECONOMIC CIRCUMSTANCES, UNSPECIFIED: Z59.9

## 2021-10-19 NOTE — TELEPHONE ENCOUNTER
Mannie #[id number] appropriate. Will place refill on 11/19/21 for Belbuca 450 mcg film 2 times daily for 30 days #60.     The patient is scheduled for a follow up on 11/22/21.

## 2021-10-20 ENCOUNTER — LAB (OUTPATIENT)
Dept: LAB | Facility: HOSPITAL | Age: 61
End: 2021-10-20

## 2021-10-20 ENCOUNTER — OFFICE VISIT (OUTPATIENT)
Dept: ENDOCRINOLOGY | Facility: CLINIC | Age: 61
End: 2021-10-20

## 2021-10-20 VITALS
BODY MASS INDEX: 24.59 KG/M2 | HEART RATE: 90 BPM | OXYGEN SATURATION: 92 % | DIASTOLIC BLOOD PRESSURE: 63 MMHG | SYSTOLIC BLOOD PRESSURE: 110 MMHG | WEIGHT: 144 LBS | HEIGHT: 64 IN

## 2021-10-20 DIAGNOSIS — E13.9 DIABETES MELLITUS SECONDARY TO PANCREATECTOMY (HCC): Primary | ICD-10-CM

## 2021-10-20 DIAGNOSIS — Z90.410 DIABETES MELLITUS SECONDARY TO PANCREATECTOMY (HCC): Primary | ICD-10-CM

## 2021-10-20 DIAGNOSIS — K31.84 GASTROPARESIS: ICD-10-CM

## 2021-10-20 DIAGNOSIS — E89.1 DIABETES MELLITUS SECONDARY TO PANCREATECTOMY (HCC): Primary | ICD-10-CM

## 2021-10-20 DIAGNOSIS — F41.9 ANXIETY: ICD-10-CM

## 2021-10-20 LAB
ALBUMIN SERPL-MCNC: 4.5 G/DL (ref 3.5–5.2)
ALBUMIN/GLOB SERPL: 1.2 G/DL
ALP SERPL-CCNC: 208 U/L (ref 39–117)
ALT SERPL W P-5'-P-CCNC: 38 U/L (ref 1–33)
ANION GAP SERPL CALCULATED.3IONS-SCNC: 12.9 MMOL/L (ref 5–15)
AST SERPL-CCNC: 49 U/L (ref 1–32)
BILIRUB SERPL-MCNC: 0.4 MG/DL (ref 0–1.2)
BUN SERPL-MCNC: 10 MG/DL (ref 8–23)
BUN/CREAT SERPL: 13 (ref 7–25)
CALCIUM SPEC-SCNC: 9.4 MG/DL (ref 8.6–10.5)
CHLORIDE SERPL-SCNC: 99 MMOL/L (ref 98–107)
CO2 SERPL-SCNC: 28.1 MMOL/L (ref 22–29)
CREAT SERPL-MCNC: 0.77 MG/DL (ref 0.57–1)
EXPIRATION DATE: ABNORMAL
EXPIRATION DATE: NORMAL
GFR SERPL CREATININE-BSD FRML MDRD: 76 ML/MIN/1.73
GLOBULIN UR ELPH-MCNC: 3.7 GM/DL
GLUCOSE BLDC GLUCOMTR-MCNC: 164 MG/DL (ref 70–130)
GLUCOSE SERPL-MCNC: 117 MG/DL (ref 65–99)
HBA1C MFR BLD: 7.2 %
Lab: ABNORMAL
Lab: NORMAL
POTASSIUM SERPL-SCNC: 4.3 MMOL/L (ref 3.5–5.2)
PROT SERPL-MCNC: 8.2 G/DL (ref 6–8.5)
SODIUM SERPL-SCNC: 140 MMOL/L (ref 136–145)

## 2021-10-20 PROCEDURE — 82947 ASSAY GLUCOSE BLOOD QUANT: CPT | Performed by: INTERNAL MEDICINE

## 2021-10-20 PROCEDURE — 3051F HG A1C>EQUAL 7.0%<8.0%: CPT | Performed by: INTERNAL MEDICINE

## 2021-10-20 PROCEDURE — 83036 HEMOGLOBIN GLYCOSYLATED A1C: CPT | Performed by: INTERNAL MEDICINE

## 2021-10-20 PROCEDURE — 84681 ASSAY OF C-PEPTIDE: CPT | Performed by: INTERNAL MEDICINE

## 2021-10-20 PROCEDURE — 99204 OFFICE O/P NEW MOD 45 MIN: CPT | Performed by: INTERNAL MEDICINE

## 2021-10-20 PROCEDURE — 80053 COMPREHEN METABOLIC PANEL: CPT | Performed by: INTERNAL MEDICINE

## 2021-10-20 RX ORDER — PROCHLORPERAZINE 25 MG/1
1 SUPPOSITORY RECTAL TAKE AS DIRECTED
Qty: 1 EACH | Refills: 3 | Status: SHIPPED | OUTPATIENT
Start: 2021-10-20 | End: 2023-01-16 | Stop reason: SDUPTHER

## 2021-10-20 RX ORDER — PROCHLORPERAZINE 25 MG/1
1 SUPPOSITORY RECTAL ONCE
Qty: 1 EACH | Refills: 0 | Status: SHIPPED | OUTPATIENT
Start: 2021-10-20 | End: 2021-10-20

## 2021-10-20 RX ORDER — PROCHLORPERAZINE 25 MG/1
1 SUPPOSITORY RECTAL TAKE AS DIRECTED
Qty: 3 EACH | Refills: 11 | Status: SHIPPED | OUTPATIENT
Start: 2021-10-20

## 2021-10-20 NOTE — TELEPHONE ENCOUNTER
Jennifer Naranjo MA 10/19/2021 5:12 PM EDT      ----- Message -----  From: Renée Laurent  Sent: 10/19/2021 2:47 PM EDT  To: [facility]  Clinical Pool  Subject:  to help me with my Creon costs     Riddhi Callejas from palliative care suggested I reach out to you to see if there’s a  in your practice who can help me obtain my Creon enzymes at a more affordable out of pocket cost. I’m on Medicare Part D prescription plan and when I fill the script, my co-pay is around $1200 and I have to out of pocket that amount numerous times throughout the year. The first time I fill it at the beginning of the year it puts me immediately into the ‘donut hole’ category because the med is so expensive. I can’t continue to pay that much, but must take the enzymes in order to eat. [facility] has a program where you can get them for free or for a really low co-pay, but I don’t qualify because I’m on Medicare. Can you help me with this?    Thanks so much. Renée

## 2021-10-20 NOTE — PROGRESS NOTES
Chief Complaint   Patient presents with   • Diabetes     type 1        Referring Provider  Karla Torres MD     HPI   Renée Laurent is a 60 y.o. female had concerns including Diabetes (type 1).    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  Patient had chronic pancreatitis due to pancreatic divisum and genetic condition.  No diabetes prior to pancreatectomy.  She had an autotransplant of islet cells at the time of surgery though this failed.  Complications include gastroparesis.  Last ophtho exam was years ago.  She is scheduled next week at [facility].  Current medications for diabetes include omnipod pump about 3.5 years for most recent. In the past was on a medtronic pump. Preferred the omnipod due to lack of tubing.    OmniPod download reviewed from 10/7/2021 through 10/20/2021 shows an average glucose of 142±47.  Average daily insulin 16.5 units, 33% bolus, 67% basal.  She is in target range 85% of the time.  Has occasional hyperglycemia to the 200s, highest 272.  Lowest BG entered into the pump is in the 80s.    Has hypoglycemic unawareness. BGs down to 50s more recently. Is less frequent in recent weeks.     Cost has been an issue. She paying $400 per month for the Omni pod supplies due to coverage issues. Is having some issues with the monitor failing recently.         Past Medical History:   Diagnosis Date   • Abdominal pain    • Absence of pancreas, acquired    • Allergic    • Anemia    • Anxiety    • Asthma    • Basal cell carcinoma    • Bile reflux gastritis    • Chronic illness    • Contact dermatitis     due to plants   • Depression    • Diabetes mellitus (HCC)    • Diabetes mellitus type I (HCC) 2011    Type 3c - surgically induced   • Eating disorder     Resolved   • Encounter for dental examination 07/2014   • Gastroparesis    • GERD (gastroesophageal reflux disease)    • History of medical problems 12/1/2011    Gastroparesis   • History of MRSA infection     Stefan  Yahir 2009 abdominal incisional infection   • Hyperlipidemia    • Hypoglycemia 2011   • Incisional hernia    • Insomnia    • Iron deficiency    • Kidney stone     Left kidney   • Myeloid leukemia (HCC)    • Osteopenia    • Osteoporosis 2015    Osteopenia   • Pancreatitis    • Pneumonia 1/1/2017   • PONV (postoperative nausea and vomiting)    • Spinal headache    • Vitamin D deficiency    • Wears glasses      Past Surgical History:   Procedure Laterality Date   • ABDOMINAL SURGERY      x 10   • CERVICAL DISCECTOMY ANTERIOR  1997    ACDF w/ fusion -Dr. Jerald Lopez   • CHOLECYSTECTOMY     • CHOLECYSTECTOMY     • COLONOSCOPY     • COSMETIC SURGERY      Rhinoplasty   • GASTRIC STIMULATOR IMPLANT SURGERY  12/17/2018    [facility]   • HERNIA MESH REMOVAL  2014    abdominal hernia with mesh    • HYSTERECTOMY     • OTHER SURGICAL HISTORY      duodeum removed   • OTHER SURGICAL HISTORY      jejunem removed   • PANCREATECTOMY     • SMALL INTESTINE SURGERY  12/1/2011    Tp-ait   • SPINAL CORD STIMULATOR IMPLANT N/A 5/10/2021    Procedure: SPINAL CORD STIMULATOR INSERTION;  Surgeon: Kedar Estevez MD;  [facility];  Service: Neurosurgery;  Laterality: N/A;   • TOTAL ABDOMINAL HYSTERECTOMY WITH SALPINGO OOPHORECTOMY     • TUBAL ABDOMINAL LIGATION     • UPPER GASTROINTESTINAL ENDOSCOPY  10/25/2017    Dr Mehta, gastritis, eosinphils in esophagus      Family History   Problem Relation Age of Onset   • Osteoporosis Mother    • Cancer Mother         CLL   • Hashimoto's thyroiditis Mother    • Arthritis Mother    • Depression Mother    • Thyroid disease Mother         Hashimoto’s   • Lung cancer Father    • Alcohol abuse Father    • Cancer Father         Lung   • Liver disease Father    • Asthma Sister       Social History     Socioeconomic History   • Marital status: Single   Tobacco Use   • Smoking status: Never Smoker   • Smokeless tobacco: Never Used   Vaping Use   • Vaping Use: Never used   Substance and  Sexual Activity   • Alcohol use: No   • Drug use: No   • Sexual activity: Not Currently     Partners: Male     Birth control/protection: Post-menopausal      Allergies   Allergen Reactions   • Aspirin Hives   • Phenergan [Promethazine Hcl]      Anxiety/relessness   • Reglan [Metoclopramide]      Involuntary movement      Current Outpatient Medications on File Prior to Visit   Medication Sig Dispense Refill   • ALPHA LIPOIC ACID PO Take 400 mg by mouth Daily.     • B Complex Vitamins (VITAMIN B COMPLEX PO) Take 1 tablet by mouth Daily.     • Biotin 29413 MCG tablet Take 1 tablet by mouth Daily.     • [START ON 11/19/2021] Buprenorphine HCl (Belbuca) 450 MCG film Apply 1 film to cheek 2 (two) times a day for 30 days. 60 film 0   • busPIRone (BUSPAR) 5 MG tablet TAKE ONE TABLET BY MOUTH TWICE A DAY 60 tablet 0   • dronabinol (MARINOL) 5 MG capsule Take 1 capsule by mouth 2 (Two) Times a Day Before Meals for 30 days. 60 capsule 3   • glucagon (GLUCAGEN) 1 MG injection Inject 1 mg into the appropriate muscle as directed by prescriber 1 (One) Time As Needed for Low Blood Sugar for up to 1 dose. 1 kit 11   • Insulin Disposable Pump (OmniPod 5 Pack) misc 0.45 Units Every 1 (One) Hour AND 1 Units Daily With Breakfast, Lunch & Dinner. Use 1 pod every 3 day.  and 1 unit for 30 g carb bolus 30 each 3   • MAGNESIUM GLYCINATE PLUS PO Take 1 tablet by mouth Daily.     • Medium Chain Triglycerides (MCT OIL PO) Take 15 mL by mouth Daily.     • Multiple Vitamins-Minerals (AQUADEKS PO) Take  by mouth.     • OLANZapine (zyPREXA) 5 MG tablet Take 1 tablet by mouth Every Night for 28 days. 28 tablet 0   • ondansetron (ZOFRAN) 4 MG tablet Take 1 tablet by mouth Every 8 (Eight) Hours As Needed for Nausea or Vomiting. 60 tablet 0   • pancrelipase, Lip-Prot-Amyl, (Creon) 26675-37875 units capsule delayed-release particles capsule TAKE 4 TO 5 CAPSULES BY MOUTH THREE TIMES A DAY WITH MEALS AND 2 TO 3 CAPSULES WITH SNACK 720 capsule 3   •  pantoprazole (PROTONIX) 40 MG EC tablet TAKE ONE TABLET BY MOUTH EVERY MORNING ON AN EMPTY STOMACH, WAIT AT LEAST 30 MINUTES TO ONE HOUR BEFORE EATING *MUST MAKE APPOINTMENT FOR FURTHER REFILLS* 30 tablet 0   • Probiotic Product (PROBIOTIC DAILY PO) Take  by mouth.     • riFAXIMin (Xifaxan) 550 MG tablet Take 550 mg by mouth As Needed.     • tretinoin (Retin-A) 0.05 % cream Apply  topically to the appropriate area as directed Every Night. 20 g 2   • Unable to find Take 500 mg by mouth Daily. Bacopa     • Unable to find Take 1 each by mouth Daily. Med Name:Ashwaghanda     • Unable to find 1 each 1 (One) Time. Med Name: Triphala     • Unable to find Take 1 each by mouth Daily. Med Name: Calcium     • vitamin C (ASCORBIC ACID) 500 MG tablet Take 500 mg by mouth Daily.     • vitamin D (ERGOCALCIFEROL) 97973 UNITS capsule capsule TAKE ONE CAPSULE BY MOUTH EVERY 7 DAYS 4 capsule 0   • [DISCONTINUED] insulin lispro (humaLOG) 100 UNIT/ML injection USE IN PUMP 0.45 UNITS PER HOUR BASAL DOSE WITH 2 UNITS PER 30 GRAM OF CARB BOLUS, WITH MEALS OR SNACKS. MAX DAILY UNITS 45 20 mL 1   • OLANZapine (zyPREXA) 2.5 MG tablet TAKE ONE TABLET BY MOUTH ONCE NIGHTLY 90 tablet 0   • [DISCONTINUED] Minoxidil 5 % foam Apply 0.5 capfuls to scalp daily, stop if no hair growth in 4 months 60 g 1     No current facility-administered medications on file prior to visit.        Review of Systems   Constitutional: Positive for appetite change, diaphoresis, fatigue and unexpected weight loss.   HENT: Positive for dental problem, postnasal drip and tinnitus.    Respiratory: Positive for shortness of breath.    Gastrointestinal: Positive for abdominal distention, abdominal pain, constipation, diarrhea, nausea, vomiting and GERD.   Endocrine: Positive for heat intolerance and polydipsia.        Hair loss   Genitourinary: Negative.    Musculoskeletal: Positive for neck pain.   Skin: Negative.    Allergic/Immunologic: Negative.    Neurological: Negative.  "   Hematological: Bruises/bleeds easily.   Psychiatric/Behavioral: Positive for decreased concentration and sleep disturbance.        /63 (BP Location: Left arm, Patient Position: Sitting, Cuff Size: Adult)   Pulse 90   Ht 162.6 cm (64\")   Wt 65.3 kg (144 lb)   LMP  (LMP Unknown) Comment: scheduled for mammo  SpO2 92%   BMI 24.72 kg/m²      Physical Exam  Cardiovascular:      Pulses:           Dorsalis pedis pulses are 2+ on the right side and 2+ on the left side.   Feet:      Right foot:      Skin integrity: Callus and dry skin present.      Toenail Condition: Right toenails are long.      Left foot:      Skin integrity: Callus and dry skin present.      Toenail Condition: Left toenails are long.      Comments: Diabetic Foot Exam Performed and Monofilament Test Performed    Monofilament 5/5 bilaterally          Constitutional:  well developed; well nourished  no acute distress   ENT/Thyroid: no thyromegaly  no palpable nodules   Eyes: EOM intact  Conjunctiva: clear   Respiratory:  breathing is unlabored  clear to auscultation bilaterally   Cardiovascular:  regular rate and rhythm, S1, S2 normal, no murmur, click, rub or gallop   Chest:  Not performed.   Abdomen: Not performed.   : Not performed.   Musculoskeletal: negative findings:  ROM of all joints is normal, no deformities present   Skin: dry and warm   Neuro: normal without focal findings and mental status, speech normal, alert and oriented x3   Psych: oriented to time, place and person, mood and affect are within normal limits     CMP:  Lab Results   Component Value Date    BUN 12 05/07/2021    CREATININE 0.74 05/07/2021    EGFRIFNONA 80 05/07/2021    BCR 16.2 05/07/2021     05/07/2021    K 4.7 05/07/2021    CO2 27.0 05/07/2021    CALCIUM 9.3 05/07/2021    PROTENTOTREF 7.7 01/13/2017    ALBUMIN 4.20 05/07/2021    LABGLOBREF 3.8 01/13/2017    LABIL2 1.0 01/13/2017    BILITOT 0.4 05/07/2021    ALKPHOS 219 (H) 05/07/2021    AST 58 (H) " 05/07/2021    ALT 47 (H) 05/07/2021     Lipid Panel:  Lab Results   Component Value Date    CHOL 146 05/07/2021    TRIG 97 05/07/2021    HDL 42 05/07/2021    VLDL 18 05/07/2021    LDL 86 05/07/2021     HbA1c:  Lab Results   Component Value Date    HGBA1C 7.2 10/20/2021    HGBA1C 7.20 (H) 05/07/2021     Glucose:  Lab Results   Component Value Date    POCGLU 164 (A) 10/20/2021     Microalbumin:  Lab Results   Component Value Date    MALBCRERATIO  09/24/2020      Comment:      Unable to calculate     TSH:  Lab Results   Component Value Date    TSH 2.940 05/07/2021       Assessment and Plan    Diagnoses and all orders for this visit:    1. 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  Patient has hypoglycemic unawareness.  Diabetes complicated with gastroparesis and therefore boluses are entered at the completion of the meal.  No changes were made to OmniPod settings today.  Recommended she bolus a portion of her meal after the start of the meal, enter the rest at the completion.  CGM is necessary.  Patient is checking her blood sugars 4 times per day, has the need to adjust doses of insulin frequently based on glucose values.  She should be due for pump upgrade in the near future and would consider transition to a tandem pump with control IQ.  I will reach out to the tandem rep.  Most labs are updated from May.  Due for urine microalbumin and will check at follow-up visit.  Ophtho exam is scheduled next week.  Monofilament was updated in the office today.  -     POC Glucose, Blood  -     POC Glycosylated Hemoglobin (Hb A1C)  -     Continuous Blood Gluc Transmit (Dexcom G6 Transmitter) misc; 1 each Take As Directed.  Dispense: 1 each; Refill: 3  -     Continuous Blood Gluc Sensor (Dexcom G6 Sensor); 1 each Take As Directed.  Dispense: 3 each; Refill: 11  -     Continuous Blood Gluc  (Dexcom G6 ) device; 1 each 1  (One) Time for 1 dose.  Dispense: 1 each; Refill: 0  -     C-Peptide  -     Comprehensive Metabolic Panel  -     insulin lispro (humaLOG) 100 UNIT/ML injection; USE IN PUMP 0.45 UNITS PER HOUR BASAL DOSE WITH 2 UNITS PER 30 GRAM OF CARB BOLUS, WITH MEALS OR SNACKS. MAX DAILY UNITS 45  Dispense: 40 mL; Refill: 1    2. Gastroparesis  As above, is complicating management of her diabetes.  Patient may be looking at getting a feeding tube in the next coming months if necessary.  Bolus of portion of the carbs consumed prior to the meal, enter the breast at the completion of the meal.       Return in about 3 months (around 1/20/2022) for next scheduled follow up. The patient was instructed to contact the clinic with any interval questions or concerns.    Betty Mathis,    Endocrinologist    Please note that portions of this document were completed with a voice recognition program. Efforts were made to edit the dictations, but occasionally words are mis-transcribed.

## 2021-10-20 NOTE — TELEPHONE ENCOUNTER
Refill request pantoprazole and buspirone   Last refill 9/14/21  Last visit 9/24/20  Sent Kruglet message to pt to sched appt for refills

## 2021-10-21 ENCOUNTER — REFERRAL TRIAGE (OUTPATIENT)
Dept: CASE MANAGEMENT | Facility: OTHER | Age: 61
End: 2021-10-21

## 2021-10-22 ENCOUNTER — TELEMEDICINE (OUTPATIENT)
Dept: INTERNAL MEDICINE | Facility: CLINIC | Age: 61
End: 2021-10-22

## 2021-10-22 ENCOUNTER — PATIENT OUTREACH (OUTPATIENT)
Dept: CASE MANAGEMENT | Facility: OTHER | Age: 61
End: 2021-10-22

## 2021-10-22 DIAGNOSIS — K31.84 GASTROPARESIS: ICD-10-CM

## 2021-10-22 DIAGNOSIS — Z90.410 ACQUIRED TOTAL ABSENCE OF PANCREAS: ICD-10-CM

## 2021-10-22 DIAGNOSIS — F41.9 ANXIETY: ICD-10-CM

## 2021-10-22 DIAGNOSIS — K21.00 GASTROESOPHAGEAL REFLUX DISEASE WITH ESOPHAGITIS WITHOUT HEMORRHAGE: Primary | ICD-10-CM

## 2021-10-22 LAB — C PEPTIDE SERPL-MCNC: 0.7 NG/ML (ref 1.1–4.4)

## 2021-10-22 PROCEDURE — 99214 OFFICE O/P EST MOD 30 MIN: CPT | Performed by: FAMILY MEDICINE

## 2021-10-22 RX ORDER — PANTOPRAZOLE SODIUM 40 MG/1
40 TABLET, DELAYED RELEASE ORAL DAILY
Qty: 90 TABLET | Refills: 1 | Status: SHIPPED | OUTPATIENT
Start: 2021-10-22 | End: 2022-04-18 | Stop reason: SDUPTHER

## 2021-10-22 RX ORDER — BUSPIRONE HYDROCHLORIDE 5 MG/1
5 TABLET ORAL 2 TIMES DAILY
Qty: 180 TABLET | Refills: 1 | Status: SHIPPED | OUTPATIENT
Start: 2021-10-22 | End: 2022-04-11 | Stop reason: SDUPTHER

## 2021-10-22 NOTE — OUTREACH NOTE
ASSESSMENT    Staff Spoke With: Pt    Reason for the Referral: Financial Concerns    Patient's Reported Cognitive Status: Alert and Oriented    Does the patient have Advanced Care Planning documents?: No.    Patient's Reported Physical Status: Independent    Patient utilizes these assistive devices and/or in home care services: None    Patient's  Status: Did not serve in the .    Patient's Current Living Arrangements/Home Environment: Pt resides in her home.     Patient's Spiritual Affiliation/Impact on Care: N/A    Patient's Behavioral Health/Substance Abuse History: No          DISCUSSION AND PLAN    Pt requested information for medication assistance programs. SW will research some options and provide those to pt.     Verbal consent obtained to contact/refer to the following individuals and/or agencies: N/A      SDOH updated and reviewed with the patient during this program:     Financial Resource Strain: Medium Risk   • Difficulty of Paying Living Expenses: Somewhat hard       Stress: Stress Concern Present   • Feeling of Stress : To some extent       REY Cates   , Ambulatory Case Management    10/22/2021 15:57 EDT

## 2021-10-22 NOTE — PROGRESS NOTES
Subjective   Renée Laurent is a 60 y.o. female.     Chief Complaint   Patient presents with   • Anxiety   You have chosen to receive care through a telehealth visit.  Do you consent to use a video/audio connection for your medical care today? Yes    This was an audio and video enabled telemedicine encounter.    Pt is in [facility]      Visit Vitals  LMP  (LMP Unknown) Comment: scheduled for mammo         Anxiety  Presents for follow-up visit. Symptoms include insomnia, nausea, nervous/anxious behavior (stable with medication) and shortness of breath. Patient reports no chest pain, compulsions, confusion, decreased concentration, depressed mood, dizziness, dry mouth, excessive worry, feeling of choking, hyperventilation, irritability, malaise, muscle tension, obsessions, palpitations, panic, restlessness or suicidal ideas.       Patient has reflux and needs her pantoprazole refilled. Pt has been having gastroparesis.  Patient not emptying her stomach and then she gets reflux.     Pt has heard from  who will check on Creon.  Patient has had a pancreatectomy and has a difficulty digesting food.  She takes Creon to help her digest food.  The Creon is $1200 a month, and is not covered by her insurance.   will see if there is a program for her.  Pt has not been absorbing food.  Pt is considering a feeding tube. Pt getting pain when she eats.    Patient is spoken with nutritionist regarding dietary supplements and liquid feeding.  Patient has a hard time digesting whey protein.  Patient was given a sample of a milk free product for dietary supplement.  Also discussed with her Slim fast which is made from soy protein.  Patient has not had problems digesting the soy.    Pt did see Endocrine yesterday. BP was normal yesterday.       Patient also had low oxygen level.  Discussed the possibility that this could be an artificial low level if she has very cold fingers.  Patient advised to  warm her fingers before doing a pulse ox.    Pt has new grandchild who is 6 months old.  Patient has Covid booster next week.  Patient asked about flu shot.  Patient was advised that she can get flu shot at the same time as her Covid booster or she can wait 2 weeks.  Patient states she will get her flu shot 2 weeks after her Covid booster.  The following portions of the patient's history were reviewed and updated as appropriate: allergies, current medications, past family history, past medical history, past social history, past surgical history and problem list.    Past Medical History:   Diagnosis Date   • Abdominal pain    • Absence of pancreas, acquired    • Allergic    • Anemia    • Anxiety    • Asthma    • Basal cell carcinoma    • Bile reflux gastritis    • Chronic illness    • Contact dermatitis     due to plants   • Depression    • Diabetes mellitus (HCC)    • Diabetes mellitus type I (HCC) 2011    Type 3c - surgically induced   • Eating disorder     Resolved   • Encounter for dental examination 07/2014   • Gastroparesis    • GERD (gastroesophageal reflux disease)    • History of medical problems 12/1/2011    Gastroparesis   • History of MRSA infection     [facility] 2009 abdominal incisional infection   • Hyperlipidemia    • Hypoglycemia 2011   • Incisional hernia    • Insomnia    • Iron deficiency    • Kidney stone     Left kidney   • Myeloid leukemia (HCC)    • Osteopenia    • Osteoporosis 2015    Osteopenia   • Pancreatitis    • Pneumonia 1/1/2017   • PONV (postoperative nausea and vomiting)    • Spinal headache    • Vitamin D deficiency    • Wears glasses       Past Surgical History:   Procedure Laterality Date   • ABDOMINAL SURGERY      x 10   • CERVICAL DISCECTOMY ANTERIOR  1997    ACDF w/ fusion -Dr. Jerald Lopez   • CHOLECYSTECTOMY     • CHOLECYSTECTOMY     • COLONOSCOPY     • COSMETIC SURGERY      Rhinoplasty   • GASTRIC STIMULATOR IMPLANT SURGERY  12/17/2018    [facility]   • HERNIA MESH  REMOVAL  2014    abdominal hernia with mesh    • HYSTERECTOMY     • OTHER SURGICAL HISTORY      duodeum removed   • OTHER SURGICAL HISTORY      jejunem removed   • PANCREATECTOMY     • SMALL INTESTINE SURGERY  12/1/2011    Tp-ait   • SPINAL CORD STIMULATOR IMPLANT N/A 5/10/2021    Procedure: SPINAL CORD STIMULATOR INSERTION;  Surgeon: Kedar Estevez MD;  Location: [facility];  Service: Neurosurgery;  Laterality: N/A;   • TOTAL ABDOMINAL HYSTERECTOMY WITH SALPINGO OOPHORECTOMY     • TUBAL ABDOMINAL LIGATION     • UPPER GASTROINTESTINAL ENDOSCOPY  10/25/2017    Dr Mehta, gastritis, eosinphils in esophagus      Family History   Problem Relation Age of Onset   • Osteoporosis Mother    • Cancer Mother         CLL   • Hashimoto's thyroiditis Mother    • Arthritis Mother    • Depression Mother    • Thyroid disease Mother         Hashimoto’s   • Lung cancer Father    • Alcohol abuse Father    • Cancer Father         Lung   • Liver disease Father    • Asthma Sister       Social History     Socioeconomic History   • Marital status: Single   Tobacco Use   • Smoking status: Never Smoker   • Smokeless tobacco: Never Used   Vaping Use   • Vaping Use: Never used   Substance and Sexual Activity   • Alcohol use: No   • Drug use: No   • Sexual activity: Not Currently     Partners: Male     Birth control/protection: Post-menopausal      Allergies   Allergen Reactions   • Aspirin Hives   • Phenergan [Promethazine Hcl]      Anxiety/relessness   • Reglan [Metoclopramide]      Involuntary movement       Review of Systems   Constitutional: Negative for irritability.   Respiratory: Positive for shortness of breath.    Cardiovascular: Negative for chest pain and palpitations.   Gastrointestinal: Positive for nausea.   Neurological: Negative for dizziness.   Psychiatric/Behavioral: Negative for confusion, decreased concentration and suicidal ideas. The patient is nervous/anxious (stable with medication) and has insomnia.        Objective    Physical Exam  Constitutional:       Appearance: Normal appearance.      Comments: Video visit   Pulmonary:      Effort: Pulmonary effort is normal. No respiratory distress.   Neurological:      Mental Status: She is alert and oriented to person, place, and time.   Psychiatric:         Mood and Affect: Mood normal.         Behavior: Behavior normal.         Thought Content: Thought content normal.         Judgment: Judgment normal.         Assessment/Plan   Diagnoses and all orders for this visit:    1. Gastroesophageal reflux disease with esophagitis without hemorrhage (Primary)  -     pantoprazole (PROTONIX) 40 MG EC tablet; Take 1 tablet by mouth Daily.  Dispense: 90 tablet; Refill: 1    2. Anxiety  -     busPIRone (BUSPAR) 5 MG tablet; Take 1 tablet by mouth 2 (Two) Times a Day.  Dispense: 180 tablet; Refill: 1    3. Acquired total absence of pancreas    4. Gastroparesis      Discussed trial of over-the-counter pancreatic enzymes or Bromaline enzymes and tablets or dried or can or fresh papaya or pineapple for those digestive enzymes, until she is able to get some Creon.    Discussed gingerroot which is available in capsules or she can try the dried bg to help with the gastroparesis.             Current Outpatient Medications:   •  ALPHA LIPOIC ACID PO, Take 400 mg by mouth Daily., Disp: , Rfl:   •  B Complex Vitamins (VITAMIN B COMPLEX PO), Take 1 tablet by mouth Daily., Disp: , Rfl:   •  Biotin 48584 MCG tablet, Take 1 tablet by mouth Daily., Disp: , Rfl:   •  [START ON 11/19/2021] Buprenorphine HCl (Belbuca) 450 MCG film, Apply 1 film to cheek 2 (two) times a day for 30 days., Disp: 60 film, Rfl: 0  •  busPIRone (BUSPAR) 5 MG tablet, Take 1 tablet by mouth 2 (Two) Times a Day., Disp: 180 tablet, Rfl: 1  •  Continuous Blood Gluc Sensor (Dexcom G6 Sensor), 1 each Take As Directed., Disp: 3 each, Rfl: 11  •  Continuous Blood Gluc Transmit (Dexcom G6 Transmitter) misc, 1 each Take As Directed., Disp: 1  each, Rfl: 3  •  dronabinol (MARINOL) 5 MG capsule, Take 1 capsule by mouth 2 (Two) Times a Day Before Meals for 30 days., Disp: 60 capsule, Rfl: 3  •  glucagon (GLUCAGEN) 1 MG injection, Inject 1 mg into the appropriate muscle as directed by prescriber 1 (One) Time As Needed for Low Blood Sugar for up to 1 dose., Disp: 1 kit, Rfl: 11  •  Insulin Disposable Pump (OmniPod 5 Pack) misc, 0.45 Units Every 1 (One) Hour AND 1 Units Daily With Breakfast, Lunch & Dinner. Use 1 pod every 3 day.  and 1 unit for 30 g carb bolus, Disp: 30 each, Rfl: 3  •  insulin lispro (humaLOG) 100 UNIT/ML injection, USE IN PUMP 0.45 UNITS PER HOUR BASAL DOSE WITH 2 UNITS PER 30 GRAM OF CARB BOLUS, WITH MEALS OR SNACKS. MAX DAILY UNITS 45, Disp: 40 mL, Rfl: 1  •  MAGNESIUM GLYCINATE PLUS PO, Take 1 tablet by mouth Daily., Disp: , Rfl:   •  Medium Chain Triglycerides (MCT OIL PO), Take 15 mL by mouth Daily., Disp: , Rfl:   •  Multiple Vitamins-Minerals (AQUADEKS PO), Take  by mouth., Disp: , Rfl:   •  OLANZapine (zyPREXA) 2.5 MG tablet, TAKE ONE TABLET BY MOUTH ONCE NIGHTLY, Disp: 90 tablet, Rfl: 0  •  OLANZapine (zyPREXA) 5 MG tablet, Take 1 tablet by mouth Every Night for 28 days., Disp: 28 tablet, Rfl: 0  •  ondansetron (ZOFRAN) 4 MG tablet, Take 1 tablet by mouth Every 8 (Eight) Hours As Needed for Nausea or Vomiting., Disp: 60 tablet, Rfl: 0  •  pancrelipase, Lip-Prot-Amyl, (Creon) 47702-18525 units capsule delayed-release particles capsule, TAKE 4 TO 5 CAPSULES BY MOUTH THREE TIMES A DAY WITH MEALS AND 2 TO 3 CAPSULES WITH SNACK, Disp: 720 capsule, Rfl: 3  •  pantoprazole (PROTONIX) 40 MG EC tablet, Take 1 tablet by mouth Daily., Disp: 90 tablet, Rfl: 1  •  Probiotic Product (PROBIOTIC DAILY PO), Take  by mouth., Disp: , Rfl:   •  riFAXIMin (Xifaxan) 550 MG tablet, Take 550 mg by mouth As Needed., Disp: , Rfl:   •  tretinoin (Retin-A) 0.05 % cream, Apply  topically to the appropriate area as directed Every Night., Disp: 20 g, Rfl: 2  •   Unable to find, Take 500 mg by mouth Daily. Bacopa, Disp: , Rfl:   •  Unable to find, Take 1 each by mouth Daily. Med Name:Ashwaghanda, Disp: , Rfl:   •  Unable to find, 1 each 1 (One) Time. Med Name: Triphala, Disp: , Rfl:   •  Unable to find, Take 1 each by mouth Daily. Med Name: Calcium, Disp: , Rfl:   •  vitamin C (ASCORBIC ACID) 500 MG tablet, Take 500 mg by mouth Daily., Disp: , Rfl:   •  vitamin D (ERGOCALCIFEROL) 16596 UNITS capsule capsule, TAKE ONE CAPSULE BY MOUTH EVERY 7 DAYS, Disp: 4 capsule, Rfl: 0    Return in about 3 months (around 1/22/2022), or if symptoms worsen or fail to improve, for Medicare Wellness, Recheck.     Recent Results (from the past 168 hour(s))   POC Glucose, Blood    Collection Time: 10/20/21  3:03 PM    Specimen: Blood   Result Value Ref Range    Glucose 164 (A) 70 - 130 mg/dL    Lot Number [id number]     Expiration Date 04/15/2022    POC Glycosylated Hemoglobin (Hb A1C)    Collection Time: 10/20/21  3:04 PM    Specimen: Blood   Result Value Ref Range    Hemoglobin A1C 7.2 %    Lot Number [id number]     Expiration Date 05/19/2023    C-Peptide    Collection Time: 10/20/21  3:48 PM    Specimen: Blood   Result Value Ref Range    C-Peptide 0.7 (L) 1.1 - 4.4 ng/mL   Comprehensive Metabolic Panel    Collection Time: 10/20/21  3:48 PM    Specimen: Blood   Result Value Ref Range    Glucose 117 (H) 65 - 99 mg/dL    BUN 10 8 - 23 mg/dL    Creatinine 0.77 0.57 - 1.00 mg/dL    Sodium 140 136 - 145 mmol/L    Potassium 4.3 3.5 - 5.2 mmol/L    Chloride 99 98 - 107 mmol/L    CO2 28.1 22.0 - 29.0 mmol/L    Calcium 9.4 8.6 - 10.5 mg/dL    Total Protein 8.2 6.0 - 8.5 g/dL    Albumin 4.50 3.50 - 5.20 g/dL    ALT (SGPT) 38 (H) 1 - 33 U/L    AST (SGOT) 49 (H) 1 - 32 U/L    Alkaline Phosphatase 208 (H) 39 - 117 U/L    Total Bilirubin 0.4 0.0 - 1.2 mg/dL    eGFR Non African Amer 76 >60 mL/min/1.73    Globulin 3.7 gm/dL    A/G Ratio 1.2 g/dL    BUN/Creatinine Ratio 13.0 7.0 - 25.0    Anion Gap 12.9 5.0 -  15.0 mmol/L

## 2021-10-23 RX ORDER — BUSPIRONE HYDROCHLORIDE 5 MG/1
TABLET ORAL
Qty: 60 TABLET | Refills: 0 | OUTPATIENT
Start: 2021-10-23

## 2021-10-23 RX ORDER — PANTOPRAZOLE SODIUM 40 MG/1
TABLET, DELAYED RELEASE ORAL
Qty: 30 TABLET | Refills: 0 | OUTPATIENT
Start: 2021-10-23

## 2021-10-25 ENCOUNTER — APPOINTMENT (OUTPATIENT)
Dept: NUTRITION | Facility: HOSPITAL | Age: 61
End: 2021-10-25

## 2021-10-29 ENCOUNTER — PATIENT OUTREACH (OUTPATIENT)
Dept: CASE MANAGEMENT | Facility: OTHER | Age: 61
End: 2021-10-29

## 2021-10-29 NOTE — OUTREACH NOTE
Patient Outreach    SW reached out to pt to follow up on Needymeds application. Pt declined assistance with this application and reported that she has obtained an adequate supply of medications from a friend. SW told pt that accepting medication that is not prescribed to you is not advisable. Pt verbalized understanding.   No other resources were requested at this time. SW will close case.     Note routed to physician.     REY Cates   , Ambulatory Case Management    10/29/2021 13:04 EDT

## 2021-11-16 ENCOUNTER — OFFICE VISIT (OUTPATIENT)
Dept: PAIN MEDICINE | Facility: CLINIC | Age: 61
End: 2021-11-16

## 2021-11-16 VITALS
HEART RATE: 95 BPM | SYSTOLIC BLOOD PRESSURE: 133 MMHG | DIASTOLIC BLOOD PRESSURE: 79 MMHG | RESPIRATION RATE: 16 BRPM | OXYGEN SATURATION: 97 % | WEIGHT: 147.4 LBS | HEIGHT: 64 IN | BODY MASS INDEX: 25.16 KG/M2 | TEMPERATURE: 97.5 F

## 2021-11-16 DIAGNOSIS — Z46.2 ENCOUNTER FOR FITTING AND ADJUSTMENT OF NEUROPACEMAKER OF SPINAL CORD: ICD-10-CM

## 2021-11-16 DIAGNOSIS — Z96.89 PRESENCE OF GASTRIC PACEMAKER: ICD-10-CM

## 2021-11-16 DIAGNOSIS — F41.9 ANXIETY AND DEPRESSION: ICD-10-CM

## 2021-11-16 DIAGNOSIS — E89.1 POST-PANCREATECTOMY DIABETES (HCC): ICD-10-CM

## 2021-11-16 DIAGNOSIS — Z96.82 PRESENCE OF NEUROSTIMULATOR: ICD-10-CM

## 2021-11-16 DIAGNOSIS — E10.9 TYPE 1 DIABETES MELLITUS WITHOUT COMPLICATIONS (HCC): ICD-10-CM

## 2021-11-16 DIAGNOSIS — Z90.410 POST-PANCREATECTOMY DIABETES (HCC): ICD-10-CM

## 2021-11-16 DIAGNOSIS — Z87.19 HISTORY OF CHRONIC PANCREATITIS: ICD-10-CM

## 2021-11-16 DIAGNOSIS — K31.84 GASTROPARESIS: ICD-10-CM

## 2021-11-16 DIAGNOSIS — R10.9 INTRACTABLE ABDOMINAL PAIN: ICD-10-CM

## 2021-11-16 DIAGNOSIS — F32.A ANXIETY AND DEPRESSION: ICD-10-CM

## 2021-11-16 DIAGNOSIS — E13.9 POST-PANCREATECTOMY DIABETES (HCC): ICD-10-CM

## 2021-11-16 PROCEDURE — 99213 OFFICE O/P EST LOW 20 MIN: CPT | Performed by: NURSE PRACTITIONER

## 2021-11-16 PROCEDURE — 95972 ALYS CPLX SP/PN NPGT W/PRGRM: CPT | Performed by: NURSE PRACTITIONER

## 2021-11-22 ENCOUNTER — TELEMEDICINE (OUTPATIENT)
Dept: PALLIATIVE CARE | Facility: CLINIC | Age: 61
End: 2021-11-22

## 2021-11-22 DIAGNOSIS — K31.84 GASTROPARESIS: Primary | ICD-10-CM

## 2021-11-22 DIAGNOSIS — R10.9 CHRONIC ABDOMINAL PAIN: ICD-10-CM

## 2021-11-22 DIAGNOSIS — F43.21 ADJUSTMENT DISORDER WITH DEPRESSED MOOD: ICD-10-CM

## 2021-11-22 DIAGNOSIS — G89.29 CHRONIC ABDOMINAL PAIN: ICD-10-CM

## 2021-11-22 PROCEDURE — 99215 OFFICE O/P EST HI 40 MIN: CPT | Performed by: PHYSICIAN ASSISTANT

## 2021-11-22 RX ORDER — BUPRENORPHINE HYDROCHLORIDE 450 UG/1
1 FILM, SOLUBLE BUCCAL 2 TIMES DAILY
Qty: 60 FILM | Refills: 0 | Status: SHIPPED | OUTPATIENT
Start: 2022-01-24 | End: 2021-12-17

## 2021-11-22 RX ORDER — BUPRENORPHINE HYDROCHLORIDE 450 UG/1
1 FILM, SOLUBLE BUCCAL 2 TIMES DAILY
Qty: 60 FILM | Refills: 0 | Status: SHIPPED | OUTPATIENT
Start: 2021-12-24 | End: 2021-12-17

## 2021-11-22 NOTE — TELEPHONE ENCOUNTER
Mannie #[id number] appropriate.  Will refill Belbuca 450 mcg twice daily for 30 days  #60.  Patient is scheduled to follow-up in 3 months.  I will make sure she has enough medication to get her to her next appointment.

## 2021-11-22 NOTE — PROGRESS NOTES
Palliative Clinic Note      Name: Renée Laurent  Age: 60 y.o.  Sex: female  : 1960  MRN: [id number]  Date of Service: 21  Referring Physician: No ref. provider found  Mode of visit: Video   Location of patient: Home    The patient has chosen to receive care through a telehealth visit.   Does the patient consent to using video/audio connection for their medical care today? Yes   No technical issues occurred during this visit.     Subjective:    Chief Complaint: Anxiety    History of Present Illness: Renée Laurent is a 60 y.o. female with past medical history significant for gastroparesis, chronic pancreatitis, post pancreatectomy diabetes, status post splenectomy, hyperlipidemia, asthma, GERD, and mood disorder who presents via videochat today as a follow up for pain and symptom management.     Treatment summary: History of chronic pancreatitis status post pancreatectomy in  with autotransplantation of the islets.  Also suffers from gastroparesis with placement of gastric pacemaker in 2018.  Unfortunately, the abdominal pain, bloating, nausea/vomiting and weight loss continued despite intervention.  Symptoms limit activity outside of home and have caused her to miss many medical appointments in the past.  Concurrent history of chronic cervical neck pain status post discectomy, fusion, and rhizotomy.  Recent implantation of a spinal stimulator by Dr. Kumar in 2020 with 60-70% improvement in pain.     Symptoms: Patient has persistent nausea, vomiting and abdominal pain related to the gastroparesis.  She has an appointment with her dietitian tomorrow and is likely going to pursue a feeding tube. She currently uses Zofran and Marinol for the N/V.   Recent pain management follow-up with APRN went well.  She reports they adjusted her stimulator which has given her more consistent pain relief.  She recently bought an electric bike and is hoping to get out and exercise more now  "that her pain is better controlled on current regimen.  The patient was scheduled to meet with in-home palliative at [facility] placed by her PCP however they did not show up for the appointment and she is reluctant to reschedule.  Patient falling asleep easier since increasing Zyprexa to 5 mg nightly.     Pyschosocial: The patient lives alone. GI flareups have a siognificant impact on her quality of life.  She reports feeling overwhelmed and tired of being \"unreliable.\"  Patient has children and grandchildren in [facility] but does not get to see them often due to her illness.   She admits to worsening anxiety related to her son.  However she feels she is able to manage the change in her mood.     Goals:  Hoping to improve quality of life with management of symptoms.    The following portions of the patient's history were reviewed and updated as appropriate: allergies, current medications, past family history, past medical history, past social history, past surgical history and problem list.    ORT-R: Low risk   decisional capacity: Full  ECOG: (2) Ambulatory and capable of self care, unable to carry out work activity, up and about > 50% or waking hours   Palliative Performance Scale Score: 70%     Objective:    Constitutional: Awake, alert, sitting up   Eyes: PERRLA, EOMS intact  HENT: NCAT, face symmetric  Neck: Supple, trachea midline  Respiratory: Nonlabored respirations  Musculoskeletal: Moves all extremities   Psychiatric: Appropriate affect, cooperative  Neurologic: Oriented x 3, Cranial Nerves grossly intact to confrontation, speech clear    Medication Counts: Collected over the phone. See bottom of note for details. No misuse or overuse evident.   YADI:  #[id number]. Reviewed. All providers are part of the care team.  UDS (Y): Last 9/24/20. Due for annual UDS.    Assessment & Plan:    1. Gastroparesis  -Upcoming follow-up with dietitian to pursue feeding tube.  Managing symptoms with Zofran and " Marinol.    2. Chronic abdominal pain  -Continue Belbuca 450 mcg twice daily.  [facility] in-home palliative provider did not show and patient is reluctant to reschedule appointment.  I have agreed to continue prescribing controlled medications.  Will refill Belbuca for the next 3 months.    3. Adjustment disorder with depressed mood  -Increased anxiety.  Patient feels she is able to manage it on her own.  Encouraged her to reach out sooner if she develops new or worsening symptoms.    Code status: Full code  Medical interventions: Full  Advanced directives: Defer    No follow-ups on file.    I spent 50 minutes caring for Renée Laurent on this date of service. This time includes time spent by me in the following activities: preparing for the visit, reviewing tests, obtaining and/or reviewing a separately obtained history, performing a medically appropriate examination and/or evaluation , counseling and educating the patient/family/caregiver, ordering medications, tests, or procedures, documenting information in the medical record, independently interpreting results and communicating that information with the patient/family/caregiver, and care coordination    Riddhi Maddox PA-C  11/22/2021    Medication Date Filled # Filled Count Used # Days  KASSIE   Belbuca 450 10/25/21 60 5 55 27 2

## 2021-11-23 ENCOUNTER — HOSPITAL ENCOUNTER (OUTPATIENT)
Dept: NUTRITION | Facility: HOSPITAL | Age: 61
Setting detail: RECURRING SERIES
Discharge: HOME | End: 2021-11-23

## 2021-11-23 NOTE — PROGRESS NOTES
Adult Outpatient Nutrition  Assessment/PES    Patient Name:  Renée Laurent  YOB: 1960  MRN: [id number]    Assessment Date:  11/23/2021    Comments:  Telephone nutrition consult, 30 minutes. This medical referred consult was provided as a ZOOM visit, as patient is unable to attend an in-office appointment due to the COVID-19 crisis. Consent for treatment was given verbally.    Patient presents for f/up for n/v 2/2 gastroparesis and food intolerance 2/2 pancrectomy. She has followed the 3 stage diet as instructed and reports that it did alleviate n/v for a time, but it resurfaced about a week later. She did resume stage 2 for 48 hours which helped but she is anxious it may happen again. She did try [employer] supplement and did tolerate, but states that she didn't enjoy it and can't see herself drinking it regularly. She is currently tolerating a low residue diet but is often fearful of eating. She does take Zofran regularly. She is taking Creon now as she was able to get 6 month supply of samples. She states that it has helped her bowels become more regular but they are still not well formed. Weight remains stable She did meet with rajan and is adjusting her insulin regimen to help prevent lows. We did discuss significant risk of hypoglycemia especially given that vomiting episodes tend to be very severe and she cannot keep anything down when they strike. Patient does have glucagon. RD did suggest asking friend or family member to come stay with her when she is ill to monitor in the event she is unable to inject herself. Patient agreeable. We also had lengthy discussion about possibility of feeding tube to supplement nutrition. Patient states that she is now willing to consider this option and RD is in full support. Patient currently established with Dr. Mehta and asks VIKKI to help facilitate appointment. RD will contact GI directly to set up consult. RD is happy to provide nutrition  recommendations if this is the course we decide to take.     Goals:  - gastroparesis 3 stage diet, 100% met  - daily peptide supplement, 0% met  - maintain blood sugar 70/180 mg/dL, 75% met     Total of 30 minutes spent with patient on nutrition counseling. Education based on Academy of Dietetics and Nutrition guidelines. Patient was provided with RD's contact information. Follow up visit is scheduled for 12/20/21 at 2:30 pm. Thank you for this referral.    Electronically signed by:  Maggi Rich RD  11/23/21 16:01 EST

## 2021-11-26 DIAGNOSIS — E89.1 POST-PANCREATECTOMY DIABETES (HCC): ICD-10-CM

## 2021-11-26 DIAGNOSIS — E13.9 POST-PANCREATECTOMY DIABETES (HCC): ICD-10-CM

## 2021-11-26 DIAGNOSIS — E10.9 TYPE 1 DIABETES MELLITUS WITHOUT COMPLICATIONS (HCC): ICD-10-CM

## 2021-11-26 DIAGNOSIS — Z90.410 POST-PANCREATECTOMY DIABETES (HCC): ICD-10-CM

## 2021-11-26 RX ORDER — INSULIN PUMP CART,CONT INF,RF
CARTRIDGE (EA) SUBCUTANEOUS
Qty: 30 EACH | Refills: 3 | Status: SHIPPED | OUTPATIENT
Start: 2021-11-26 | End: 2022-08-26

## 2021-11-26 NOTE — TELEPHONE ENCOUNTER
Last Office Visit: 10/22/2021  Next Office Visit: no future appt scheduled     Medication: Insulin Disposable Pump (OmniPod)   Last Refill Date: 11/18/2020  Quantity: 30  Refills:3  Pt currently using Dexcom Sensor. Unsure if this is a part of this. I am sending this to you to approve or deny.     Pharmacy:  [facility] - [address], [address] - [phone]  - [phone] FX    Please review pended refill request for any changes needed on refills or quantities. Thank you!

## 2021-12-17 DIAGNOSIS — R10.9 CHRONIC ABDOMINAL PAIN: ICD-10-CM

## 2021-12-17 DIAGNOSIS — G89.29 CHRONIC ABDOMINAL PAIN: ICD-10-CM

## 2021-12-17 RX ORDER — BUPRENORPHINE HYDROCHLORIDE 450 UG/1
1 FILM, SOLUBLE BUCCAL 2 TIMES DAILY
Qty: 60 FILM | Refills: 0 | Status: SHIPPED | OUTPATIENT
Start: 2022-02-21 | End: 2022-03-21 | Stop reason: SDUPTHER

## 2021-12-17 RX ORDER — BUPRENORPHINE HYDROCHLORIDE 450 UG/1
1 FILM, SOLUBLE BUCCAL 2 TIMES DAILY
Qty: 60 FILM | Refills: 0 | Status: SHIPPED | OUTPATIENT
Start: 2021-12-23 | End: 2022-01-22

## 2021-12-17 RX ORDER — BUPRENORPHINE HYDROCHLORIDE 450 UG/1
1 FILM, SOLUBLE BUCCAL 2 TIMES DAILY
Qty: 60 FILM | Refills: 0 | Status: SHIPPED | OUTPATIENT
Start: 2022-01-22 | End: 2022-02-21

## 2021-12-17 NOTE — TELEPHONE ENCOUNTER
Mannie # [id number] appropriate. Previous prescriptions entered incorrectly. I will send three new refills for Belbuca 450 mcg for 30 days #60. I will call the pharmacy and cancel the previous scripts. The patient is scheduled for a follow up on 2/22/21.

## 2021-12-20 ENCOUNTER — APPOINTMENT (OUTPATIENT)
Dept: NUTRITION | Facility: HOSPITAL | Age: 61
End: 2021-12-20

## 2022-01-03 DIAGNOSIS — G47.00 INSOMNIA, UNSPECIFIED TYPE: ICD-10-CM

## 2022-01-03 RX ORDER — OLANZAPINE 5 MG/1
5 TABLET ORAL NIGHTLY
Qty: 30 TABLET | Refills: 3 | Status: SHIPPED | OUTPATIENT
Start: 2022-01-03 | End: 2022-04-13 | Stop reason: DRUGHIGH

## 2022-01-04 ENCOUNTER — HOSPITAL ENCOUNTER (OUTPATIENT)
Dept: NUTRITION | Facility: HOSPITAL | Age: 62
Setting detail: RECURRING SERIES
Discharge: HOME | End: 2022-01-04

## 2022-01-04 PROCEDURE — 97803 MED NUTRITION INDIV SUBSEQ: CPT | Performed by: DIETITIAN, REGISTERED

## 2022-01-04 NOTE — PROGRESS NOTES
Adult Outpatient Nutrition  Assessment/PES    Patient Name:  Renée Laurent  YOB: 1960  MRN: [id number]    Assessment Date:  1/4/2022    Comments:  Telephone nutrition consult, 30 minutes. This medical referred consult was provided as a ZOOM visit, as patient is unable to attend an in-office appointment due to the COVID-19 crisis. Consent for treatment was given verbally.     Patient presents for f/up for n/v 2/2 gastroparesis and food intolerance 2/2 pancrectomy. She has not had any improvement since last visit. She is following Stage 2-3 diet depending on how her stomach is feeling.  She does take Zofran regularly. She is taking Creon now as she was able to get 6 month supply of samples. She is still having significant blood sugar fluctuations as a result of symptoms. She has started dosing insulin post prandially in an effort to prevent hypoglycemia. Weight remains stable RD did reach out to GI following last visit and patient states she never received any communication. RD recommended that patient reach out to GI to schedule f/up appointment to discuss option for TF. Patient states that she has no further questions or concerns at this time. RD provided contact information and instructed to call should further nutrition concerns arise.      Total of 30 minutes spent with patient on nutrition counseling. Education based on Academy of Dietetics and Nutrition guidelines. Patient was provided with RD's contact information. Follow up pending GI evaluation. Thank you for this referral.    Electronically signed by:  Maggi Rich RD  01/04/22 14:20 EST

## 2022-01-17 ENCOUNTER — PATIENT MESSAGE (OUTPATIENT)
Dept: PALLIATIVE CARE | Facility: CLINIC | Age: 62
End: 2022-01-17

## 2022-01-17 ENCOUNTER — PRIOR AUTHORIZATION (OUTPATIENT)
Dept: PALLIATIVE CARE | Facility: CLINIC | Age: 62
End: 2022-01-17

## 2022-01-17 NOTE — TELEPHONE ENCOUNTER
Prior Authorization for Belbuca 450 mcg submitted on 1/13/22. Member ID # [id number]. Reference # [id number]. Medication approved until 12/31/22.

## 2022-03-21 DIAGNOSIS — R10.9 CHRONIC ABDOMINAL PAIN: ICD-10-CM

## 2022-03-21 DIAGNOSIS — G89.29 CHRONIC ABDOMINAL PAIN: ICD-10-CM

## 2022-03-21 RX ORDER — BUPRENORPHINE HYDROCHLORIDE 450 UG/1
1 FILM, SOLUBLE BUCCAL 2 TIMES DAILY
Qty: 60 FILM | Refills: 0 | Status: SHIPPED | OUTPATIENT
Start: 2022-03-21 | End: 2022-03-22 | Stop reason: SDUPTHER

## 2022-03-21 NOTE — PROGRESS NOTES
Palliative Clinic Note      Name: Renée Laurent  Age: 61 y.o.  Sex: female  : 1960  MRN: [id number]  Date of Service:22  Mode of visit: Video   Location of patient: Home    The patient has chosen to receive care through a telehealth visit.   Does the patient consent to using video/audio connection for their medical care today? Yes   No technical issues occurred during this visit.     Subjective:    Chief Complaint: Insomnia    History of Present Illness: Renée Laurent is a 61 y.o. female with past medical history significant for gastroparesis, chronic pancreatitis, post pancreatectomy diabetes, status post splenectomy, hyperlipidemia, asthma, GERD, and mood disorder who presents via videochat today as a follow up for pain and symptom management.     Treatment summary: History of chronic pancreatitis status post pancreatectomy in  with autotransplantation of the islets.  Also suffers from gastroparesis with placement of gastric pacemaker in 2018.  Unfortunately, the abdominal pain, bloating, nausea/vomiting and weight loss continued despite intervention.  Symptoms limit activity outside of home and have caused her to miss many medical appointments in the past. Concurrent history of chronic cervical neck pain status post discectomy, fusion, and rhizotomy.  Recent implantation of a spinal stimulator by Dr. Kumar in 2020 with 60-70% improvement in pain. The patient is scheduled to have a temporary feeding tube placed on 22.      Symptoms: The patient reports trouble falling asleep. This got better after we increased the Zyprexa to 5 mg nightly but has seemed to stop working. She states she has tried Ambien 5 mg in the past with good luck and is interested in restarting that. She does admit to sleep walking which is why she ultimately stopped the medication.  The patient is anxious in anticipation for the feeding tube but overall hopeful it will improve her quality of  "life. Her pain has been well managed on Belbuca 450 mcg films in addition to the spinal stimulator however the patient states the medication co-pay is $125 and she does not think she can afford this long term. She is interested in finding a pain specialist that can manage oral analgesics in addition to her stimulator for better continuity of care. She attributes her fatigue to her inability to absorb nutrients.      Pyschosocial: The patient lives alone. GI flareups have a siognificant impact on her quality of life.  She reports feeling overwhelmed and tired of being \"unreliable.\"  Patient has children and grandchildren in [facility] but does not get to see them often due to her illness.   She admits to worsening anxiety related to financial stress.      Goals:  Hoping to improve quality of life with management of symptoms. She is considering changing her code status/interventions due to poor quality of life.      The following portions of the patient's history were reviewed and updated as appropriate: allergies, current medications, past family history, past medical history, past social history, past surgical history and problem list.    ORT-R: Low risk   Decisional capacity: Full  ECOG: (2) Ambulatory and capable of self care, unable to carry out work activity, up and about > 50% or waking hours   Palliative Performance Scale Score: 70%     Objective:    Constitutional: Awake, alert, sitting up   Eyes: PERRLA, EOMS intact  HENT: NCAT, face symmetric  Neck: Supple, trachea midline  Respiratory: Nonlabored respirations  Musculoskeletal: Moves all extremities   Psychiatric: Appropriate affect, cooperative  Neurologic: Oriented x 3, Cranial Nerves grossly intact to confrontation, speech clear    Medication Counts: Collected over the phone. See bottom of note for details. No misuse or overuse evident.   YADI:  #[id number]. Reviewed. All providers are part of the care team.  UDS (Y): Last 9/24/20. Order " placed today.    Assessment & Plan:    1. Gastroparesis  --Temporary feeding tube placement scheduled for 4/1/22.     2. Chronic abdominal pain  --Next refill for Belbuca on 3/22/22. The copay has become too expensive for the patient. She is interested in finding a pain specialist that can manage oral analgesics in addition to her stimulator for better continuity of care. I will send a referral to pain specialist, Dr. ESTEFANIA Knight. I also provided their clinic information to the patient.     3. Insomnia, unspecified type  --Patient requests to restart zolpidem (AMBIEN) 5 MG tablet; Take 0.5 tablets by mouth At Night As Needed for Sleep. We will try a half tablet nightly instead of a whole tablet which is what she was previously taking.     4. Anxiety and depression  --Patient is open to trying a mood stabilizer for increased anxiety related to chronic illness and psychosocial stressors. I will send a script for sertraline (ZOLOFT) 25 MG tablet; Take 1 tablet by mouth Daily. Gave the patient the option to stop the Buspar and Zyprexa or continue.     Code status: Full code  Medical interventions: Full  Advanced directives: Defer    Return in about 1 month (around 4/22/2022) for Video visit.    I spent 30 minutes caring for Renée Laurent on this date of service. This time includes time spent by me in the following activities: preparing for the visit, reviewing tests, obtaining and/or reviewing a separately obtained history, performing a medically appropriate examination and/or evaluation , counseling and educating the patient/family/caregiver, ordering medications, tests, or procedures, documenting information in the medical record, independently interpreting results and communicating that information with the patient/family/caregiver, and care coordination    Riddhi Maddox PA-C  03/22/2022    Medication Date Filled # Filled Count Used # Days  KASSIE   Belbuca 450 mcg 2/21/22 60 2 58 28 2

## 2022-03-21 NOTE — TELEPHONE ENCOUNTER
Mannie # [id number] appropriate. Refill for Belbuca 450 mgc films placed today. She is scheduled for a visit on 3/22/22.

## 2022-03-22 ENCOUNTER — TELEMEDICINE (OUTPATIENT)
Dept: PALLIATIVE CARE | Facility: CLINIC | Age: 62
End: 2022-03-22

## 2022-03-22 DIAGNOSIS — F41.9 ANXIETY AND DEPRESSION: ICD-10-CM

## 2022-03-22 DIAGNOSIS — K31.84 GASTROPARESIS: Primary | ICD-10-CM

## 2022-03-22 DIAGNOSIS — G89.29 CHRONIC ABDOMINAL PAIN: ICD-10-CM

## 2022-03-22 DIAGNOSIS — R10.9 CHRONIC ABDOMINAL PAIN: ICD-10-CM

## 2022-03-22 DIAGNOSIS — G47.00 INSOMNIA, UNSPECIFIED TYPE: ICD-10-CM

## 2022-03-22 DIAGNOSIS — F32.A ANXIETY AND DEPRESSION: ICD-10-CM

## 2022-03-22 PROCEDURE — 99213 OFFICE O/P EST LOW 20 MIN: CPT | Performed by: PHYSICIAN ASSISTANT

## 2022-03-22 RX ORDER — SERTRALINE HYDROCHLORIDE 25 MG/1
25 TABLET, FILM COATED ORAL DAILY
Qty: 30 TABLET | Refills: 0 | Status: SHIPPED | OUTPATIENT
Start: 2022-03-22 | End: 2022-04-18

## 2022-03-22 RX ORDER — BUPRENORPHINE HYDROCHLORIDE 450 UG/1
1 FILM, SOLUBLE BUCCAL 2 TIMES DAILY
Qty: 60 FILM | Refills: 0 | Status: SHIPPED | OUTPATIENT
Start: 2022-04-20 | End: 2022-04-22 | Stop reason: SDUPTHER

## 2022-03-22 RX ORDER — ZOLPIDEM TARTRATE 5 MG/1
2.5 TABLET ORAL NIGHTLY PRN
Qty: 30 TABLET | Refills: 0 | Status: SHIPPED | OUTPATIENT
Start: 2022-03-22 | End: 2022-04-06 | Stop reason: DRUGHIGH

## 2022-03-22 NOTE — TELEPHONE ENCOUNTER
YADI  #[id number] and counts appropriate. Next refill Belbuca 450 mcg placed for 4/20/22. The patient will follow up in 1 month.

## 2022-04-04 DIAGNOSIS — F41.9 ANXIETY: ICD-10-CM

## 2022-04-05 RX ORDER — BUSPIRONE HYDROCHLORIDE 5 MG/1
TABLET ORAL
Qty: 180 TABLET | Refills: 1 | OUTPATIENT
Start: 2022-04-05

## 2022-04-06 DIAGNOSIS — G47.00 INSOMNIA, UNSPECIFIED TYPE: Primary | ICD-10-CM

## 2022-04-06 RX ORDER — ZOLPIDEM TARTRATE 5 MG/1
5 TABLET ORAL NIGHTLY PRN
Qty: 30 TABLET | Refills: 0 | Status: SHIPPED | OUTPATIENT
Start: 2022-04-21 | End: 2022-05-20 | Stop reason: SDUPTHER

## 2022-04-11 ENCOUNTER — TELEPHONE (OUTPATIENT)
Dept: INTERNAL MEDICINE | Facility: CLINIC | Age: 62
End: 2022-04-11

## 2022-04-11 DIAGNOSIS — F41.9 ANXIETY: ICD-10-CM

## 2022-04-11 RX ORDER — BUSPIRONE HYDROCHLORIDE 5 MG/1
5 TABLET ORAL 2 TIMES DAILY
Qty: 60 TABLET | Refills: 0 | Status: SHIPPED | OUTPATIENT
Start: 2022-04-11 | End: 2022-04-18 | Stop reason: SDUPTHER

## 2022-04-11 NOTE — TELEPHONE ENCOUNTER
Pt called to see if she can change her appt on 4/18/21 to a video visit. She hasn't been seen since 10/21 not sure if she needs to come in or can do video visit. She also will be out of Buspirone 5mg today and wanting to know if she can have enough to last until her visit.

## 2022-04-13 DIAGNOSIS — G47.00 INSOMNIA, UNSPECIFIED TYPE: ICD-10-CM

## 2022-04-13 DIAGNOSIS — F41.9 ANXIETY: Primary | ICD-10-CM

## 2022-04-13 RX ORDER — OLANZAPINE 2.5 MG/1
2.5 TABLET ORAL NIGHTLY
Qty: 30 TABLET | Refills: 0 | Status: SHIPPED | OUTPATIENT
Start: 2022-04-13 | End: 2022-06-20 | Stop reason: SINTOL

## 2022-04-18 ENCOUNTER — TELEMEDICINE (OUTPATIENT)
Dept: INTERNAL MEDICINE | Facility: CLINIC | Age: 62
End: 2022-04-18

## 2022-04-18 VITALS — BODY MASS INDEX: 23.17 KG/M2 | WEIGHT: 135 LBS

## 2022-04-18 DIAGNOSIS — F32.A ANXIETY AND DEPRESSION: ICD-10-CM

## 2022-04-18 DIAGNOSIS — F41.9 ANXIETY AND DEPRESSION: ICD-10-CM

## 2022-04-18 DIAGNOSIS — K21.00 GASTROESOPHAGEAL REFLUX DISEASE WITH ESOPHAGITIS WITHOUT HEMORRHAGE: ICD-10-CM

## 2022-04-18 DIAGNOSIS — K31.84 GASTROPARESIS: ICD-10-CM

## 2022-04-18 DIAGNOSIS — F41.9 ANXIETY: ICD-10-CM

## 2022-04-18 PROCEDURE — 99214 OFFICE O/P EST MOD 30 MIN: CPT | Performed by: FAMILY MEDICINE

## 2022-04-18 RX ORDER — SERTRALINE HYDROCHLORIDE 25 MG/1
TABLET, FILM COATED ORAL
Qty: 30 TABLET | Refills: 0 | Status: SHIPPED | OUTPATIENT
Start: 2022-04-18 | End: 2022-05-16

## 2022-04-18 RX ORDER — BUSPIRONE HYDROCHLORIDE 5 MG/1
5 TABLET ORAL 2 TIMES DAILY
Qty: 180 TABLET | Refills: 1 | Status: SHIPPED | OUTPATIENT
Start: 2022-04-18 | End: 2022-11-11

## 2022-04-18 RX ORDER — PANTOPRAZOLE SODIUM 40 MG/1
40 TABLET, DELAYED RELEASE ORAL DAILY
Qty: 90 TABLET | Refills: 1 | Status: SHIPPED | OUTPATIENT
Start: 2022-04-18 | End: 2022-10-17 | Stop reason: SDUPTHER

## 2022-04-18 RX ORDER — ONDANSETRON 4 MG/1
4 TABLET, FILM COATED ORAL EVERY 8 HOURS PRN
Qty: 60 TABLET | Refills: 1 | Status: SHIPPED | OUTPATIENT
Start: 2022-04-18 | End: 2022-06-24

## 2022-04-18 NOTE — PROGRESS NOTES
Subjective   Renée Laurent is a 61 y.o. female.     Chief Complaint   Patient presents with   • Anxiety   • GI Problem     Reflux and gastroparesis     You have chosen to receive care through a telehealth visit.  Do you consent to use a video/audio connection for your medical care today? Yes    This was an audio and video enabled telemedicine encounter.  Pt is home in [place].  I am in my office.     Pt unable to use [employer] for video visit. Audio/Video visit completed with [employer].     Visit Vitals  Wt 61.2 kg (135 lb)   LMP  (LMP Unknown) Comment: scheduled for mammo   BMI 23.17 kg/m²       Wt Readings from Last 3 Encounters:   04/18/22 61.2 kg (135 lb)   11/16/21 66.9 kg (147 lb 6.4 oz)   10/20/21 65.3 kg (144 lb)         Anxiety  Presents for follow-up visit. Symptoms include depressed mood (resolving ), excessive worry (since she abruptly stopped the olazepine), insomnia, nausea, nervous/anxious behavior and panic (resolving). Patient reports no chest pain, compulsions, confusion, decreased concentration, dizziness, dry mouth, feeling of choking, hyperventilation, irritability, malaise, muscle tension, obsessions, palpitations, restlessness, shortness of breath or suicidal ideas. The severity of symptoms is moderate. The quality of sleep is poor. Nighttime awakenings: several.       GI Problem  The primary symptoms include fatigue, abdominal pain, nausea and vomiting. Primary symptoms do not include fever, weight loss, diarrhea, melena, hematemesis, jaundice, hematochezia, dysuria, myalgias, arthralgias or rash. Episode onset: more than a yr. The problem has not changed (stable the past few months) since onset.  The illness is also significant for chills, anorexia, bloating and constipation. The illness does not include dysphagia, odynophagia, tenesmus, back pain or itching. Significant associated medical issues include GERD. Associated medical issues do not include inflammatory bowel disease,  gallstones, liver disease, alcohol abuse, PUD, gastric bypass, bowel resection, irritable bowel syndrome, hemorrhoids or diverticulitis.      Pt has a feeding tube surgery scheduled for May 20th.   Pt is losing her hair and is not absorbing her food.   Pt is on olanzepine for sleep from her palliative care. Pt stopped the medication abruptly 12 days ago. Pt was taking for appetite from palliative care.  Pt had severe withdrawal and depression that is improving this week.     Pt is using blenderized food.  Pt is going to Palliative care.   The following portions of the patient's history were reviewed and updated as appropriate: allergies, current medications, past family history, past medical history, past social history, past surgical history and problem list.    Past Medical History:   Diagnosis Date   • Abdominal pain    • Absence of pancreas, acquired    • Allergic    • Anemia    • Anxiety    • Asthma    • Basal cell carcinoma    • Bile reflux gastritis    • Chronic illness    • Contact dermatitis     due to plants   • Depression    • Diabetes mellitus (HCC)    • Diabetes mellitus type I (HCC) 2011    Type 3c - surgically induced   • Eating disorder     Resolved   • Encounter for dental examination 07/2014   • Gastroparesis    • GERD (gastroesophageal reflux disease)    • History of medical problems 12/1/2011    Gastroparesis   • History of MRSA infection     [facility] 2009 abdominal incisional infection   • Hyperlipidemia    • Hypoglycemia 2011   • Incisional hernia    • Insomnia    • Iron deficiency    • Kidney stone     Left kidney   • Myeloid leukemia (HCC)    • Osteopenia    • Osteoporosis 2015    Osteopenia   • Pancreatitis    • Pneumonia 1/1/2017   • PONV (postoperative nausea and vomiting)    • Spinal headache    • Vitamin D deficiency    • Wears glasses       Past Surgical History:   Procedure Laterality Date   • ABDOMINAL SURGERY      x 10   • CERVICAL DISCECTOMY ANTERIOR  1997    ACDF w/ fusion -  [facility]   • CHOLECYSTECTOMY     • CHOLECYSTECTOMY     • COLONOSCOPY     • COSMETIC SURGERY      Rhinoplasty   • GASTRIC STIMULATOR IMPLANT SURGERY  12/17/2018    [facility]   • HERNIA MESH REMOVAL  2014    abdominal hernia with mesh    • HYSTERECTOMY     • OTHER SURGICAL HISTORY      duodeum removed   • OTHER SURGICAL HISTORY      jejunem removed   • PANCREATECTOMY     • SMALL INTESTINE SURGERY  12/1/2011    Tp-ait   • SPINAL CORD STIMULATOR IMPLANT N/A 5/10/2021    Procedure: SPINAL CORD STIMULATOR INSERTION;  Surgeon: Kedar Estevez MD;  [facility];  Service: Neurosurgery;  Laterality: N/A;   • TOTAL ABDOMINAL HYSTERECTOMY WITH SALPINGO OOPHORECTOMY     • TUBAL ABDOMINAL LIGATION     • UPPER GASTROINTESTINAL ENDOSCOPY  10/25/2017    Dr Mehta, gastritis, eosinphils in esophagus      Family History   Problem Relation Age of Onset   • Osteoporosis Mother    • Cancer Mother         CLL   • Hashimoto's thyroiditis Mother    • Arthritis Mother    • Depression Mother    • Thyroid disease Mother         Hashimoto’s   • Lung cancer Father    • Alcohol abuse Father    • Cancer Father         Lung   • Liver disease Father    • Asthma Sister       Social History     Socioeconomic History   • Marital status: Single   Tobacco Use   • Smoking status: Never Smoker   • Smokeless tobacco: Never Used   Vaping Use   • Vaping Use: Never used   Substance and Sexual Activity   • Alcohol use: No   • Drug use: No   • Sexual activity: Not Currently     Partners: Male     Birth control/protection: Post-menopausal      Allergies   Allergen Reactions   • Aspirin Hives   • Phenergan [Promethazine Hcl]      Anxiety/relessness   • Reglan [Metoclopramide]      Involuntary movement       Review of Systems   Constitutional: Positive for chills and fatigue. Negative for fever, irritability and weight loss.   Respiratory: Negative for shortness of breath.    Cardiovascular: Negative for chest pain and palpitations.    Gastrointestinal: Positive for abdominal pain, anorexia, bloating, constipation, nausea and vomiting. Negative for diarrhea, dysphagia, hematemesis, hematochezia, jaundice and melena.   Genitourinary: Negative for dysuria.   Musculoskeletal: Negative for arthralgias, back pain and myalgias.   Skin: Negative for itching and rash.   Neurological: Negative for dizziness.   Psychiatric/Behavioral: Negative for confusion, decreased concentration and suicidal ideas. The patient is nervous/anxious and has insomnia.        Objective   Physical Exam  Constitutional:       Appearance: Normal appearance. She is normal weight.   HENT:      Head: Normocephalic.      Nose: No congestion.   Pulmonary:      Effort: Pulmonary effort is normal.   Neurological:      Mental Status: She is alert and oriented to person, place, and time.   Psychiatric:         Mood and Affect: Mood normal.         Thought Content: Thought content normal.         Judgment: Judgment normal.         Assessment/Plan   Diagnoses and all orders for this visit:    1. Gastroparesis  -     ondansetron (ZOFRAN) 4 MG tablet; Take 1 tablet by mouth Every 8 (Eight) Hours As Needed for Nausea or Vomiting.  Dispense: 60 tablet; Refill: 1    2. Gastroesophageal reflux disease with esophagitis without hemorrhage  -     pantoprazole (PROTONIX) 40 MG EC tablet; Take 1 tablet by mouth Daily.  Dispense: 90 tablet; Refill: 1    3. Anxiety  -     busPIRone (BUSPAR) 5 MG tablet; Take 1 tablet by mouth 2 (Two) Times a Day.  Dispense: 180 tablet; Refill: 1                   Current Outpatient Medications:   •  busPIRone (BUSPAR) 5 MG tablet, Take 1 tablet by mouth 2 (Two) Times a Day., Disp: 180 tablet, Rfl: 1  •  ondansetron (ZOFRAN) 4 MG tablet, Take 1 tablet by mouth Every 8 (Eight) Hours As Needed for Nausea or Vomiting., Disp: 60 tablet, Rfl: 1  •  pantoprazole (PROTONIX) 40 MG EC tablet, Take 1 tablet by mouth Daily., Disp: 90 tablet, Rfl: 1  •  ALPHA LIPOIC ACID PO, Take  400 mg by mouth Daily., Disp: , Rfl:   •  B Complex Vitamins (VITAMIN B COMPLEX PO), Take 1 tablet by mouth Daily., Disp: , Rfl:   •  Biotin 88071 MCG tablet, Take 1 tablet by mouth Daily., Disp: , Rfl:   •  [START ON 4/20/2022] Buprenorphine HCl (Belbuca) 450 MCG film, Apply 1 film to cheek 2 (Two) Times a Day for 30 days., Disp: 60 film, Rfl: 0  •  Continuous Blood Gluc Sensor (Dexcom G6 Sensor), 1 each Take As Directed., Disp: 3 each, Rfl: 11  •  Continuous Blood Gluc Transmit (Dexcom G6 Transmitter) misc, 1 each Take As Directed., Disp: 1 each, Rfl: 3  •  glucagon (GLUCAGEN) 1 MG injection, Inject 1 mg into the appropriate muscle as directed by prescriber 1 (One) Time As Needed for Low Blood Sugar for up to 1 dose., Disp: 1 kit, Rfl: 11  •  Insulin Disposable Pump (OmniPod 5 Pack) misc, USE 1 POD EVERY 3 DAYS, Disp: 30 each, Rfl: 3  •  insulin lispro (humaLOG) 100 UNIT/ML injection, USE IN PUMP 0.45 UNITS PER HOUR BASAL DOSE WITH 2 UNITS PER 30 GRAM OF CARB BOLUS, WITH MEALS OR SNACKS. MAX DAILY UNITS 45, Disp: 40 mL, Rfl: 1  •  MAGNESIUM GLYCINATE PLUS PO, Take 1 tablet by mouth Daily., Disp: , Rfl:   •  Medium Chain Triglycerides (MCT OIL PO), Take 15 mL by mouth Daily., Disp: , Rfl:   •  Multiple Vitamins-Minerals (AQUADEKS PO), Take  by mouth., Disp: , Rfl:   •  OLANZapine (zyPREXA) 2.5 MG tablet, Take 1 tablet by mouth Every Night., Disp: 30 tablet, Rfl: 0  •  pancrelipase, Lip-Prot-Amyl, (Creon) 78569-95997 units capsule delayed-release particles capsule, TAKE 4 TO 5 CAPSULES BY MOUTH THREE TIMES A DAY WITH MEALS AND 2 TO 3 CAPSULES WITH SNACK, Disp: 720 capsule, Rfl: 3  •  Probiotic Product (PROBIOTIC DAILY PO), Take  by mouth., Disp: , Rfl:   •  riFAXIMin (Xifaxan) 550 MG tablet, Take 550 mg by mouth As Needed., Disp: , Rfl:   •  sertraline (ZOLOFT) 25 MG tablet, TAKE ONE TABLET BY MOUTH DAILY, Disp: 30 tablet, Rfl: 0  •  tretinoin (Retin-A) 0.05 % cream, Apply  topically to the appropriate area as  directed Every Night., Disp: 20 g, Rfl: 2  •  Unable to find, Take 500 mg by mouth Daily. Bacopa, Disp: , Rfl:   •  Unable to find, Take 1 each by mouth Daily. Med Name:Ashwaghanda, Disp: , Rfl:   •  Unable to find, 1 each 1 (One) Time. Med Name: Triphala, Disp: , Rfl:   •  Unable to find, Take 1 each by mouth Daily. Med Name: Calcium, Disp: , Rfl:   •  vitamin C (ASCORBIC ACID) 500 MG tablet, Take 500 mg by mouth Daily., Disp: , Rfl:   •  vitamin D (ERGOCALCIFEROL) 93239 UNITS capsule capsule, TAKE ONE CAPSULE BY MOUTH EVERY 7 DAYS, Disp: 4 capsule, Rfl: 0  •  [START ON 4/21/2022] zolpidem (AMBIEN) 5 MG tablet, Take 1 tablet by mouth At Night As Needed for Sleep for up to 30 days., Disp: 30 tablet, Rfl: 0    Return in about 6 months (around 10/18/2022), or if symptoms worsen or fail to improve, for Medicare Wellness.

## 2022-04-19 DIAGNOSIS — Z90.410 DIABETES MELLITUS SECONDARY TO PANCREATECTOMY: ICD-10-CM

## 2022-04-19 DIAGNOSIS — E13.9 DIABETES MELLITUS SECONDARY TO PANCREATECTOMY: ICD-10-CM

## 2022-04-19 DIAGNOSIS — E89.1 DIABETES MELLITUS SECONDARY TO PANCREATECTOMY: ICD-10-CM

## 2022-04-20 DIAGNOSIS — K21.00 GASTROESOPHAGEAL REFLUX DISEASE WITH ESOPHAGITIS WITHOUT HEMORRHAGE: ICD-10-CM

## 2022-04-20 RX ORDER — PANTOPRAZOLE SODIUM 40 MG/1
TABLET, DELAYED RELEASE ORAL
Qty: 90 TABLET | Refills: 1 | OUTPATIENT
Start: 2022-04-20

## 2022-04-21 NOTE — PROGRESS NOTES
Palliative Clinic Note      Name: Renée Laurent  Age: 61 y.o.  Sex: female  : 1960  MRN: [id number]  Date of Service: 22  Mode of visit: Video   Location of patient: Home    The patient has chosen to receive care through a telehealth visit.   Does the patient consent to using video/audio connection for their medical care today? Yes   No technical issues occurred during this visit.     Subjective:    Chief Complaint: withdrawal symptoms    History of Present Illness: Renée Laurent is a 61 y.o. female with past medical history significant for gastroparesis, chronic pancreatitis, post pancreatectomy diabetes, status post splenectomy, hyperlipidemia, asthma, GERD, and mood disorder who presents via videochat today as a follow up for pain and symptom management.     Treatment summary: History of chronic pancreatitis status post pancreatectomy in  with autotransplantation of the islets.  Also suffers from gastroparesis with placement of gastric pacemaker in 2018.  Unfortunately, the abdominal pain, bloating, nausea/vomiting and weight loss continued despite intervention.  Symptoms limit activity outside of home and have caused her to miss many medical appointments in the past. Concurrent history of chronic cervical neck pain status post discectomy, fusion, and rhizotomy.  Status post implantation of a spinal stimulator by Dr. Kumar in 2020 with 60-70% improvement in pain. The patient scheduled for a temporary feeding tube placement on 22.      Symptoms: The patient reports severe withdrawal symptoms after stopping the Zyprexa. Symptoms such as palpitations, sweating, decreased appetite, nausea, anxiety, and suicidal thoughts. This felt similar to when she tapered off of benzodiazepine therapy. She is now several weeks out and is feeling much improved. She is anticipating upcoming feeding tube placement. In the meantime, she is blended up all of her food. Her pain has  "been well managed on Belbuca 450 mcg films in addition to the spinal stimulator however the patient states the medication co-pay is $125 and she does not think she can afford this long term. She is interested in finding a pain specialist that can manage oral analgesics in addition to her stimulator for better continuity of care. The patient was scheduled for an appointment with Dr. ESTEFANIA Knight but when she showed up on time, the clinic was closed. She has not been able to get anyone to call her back. The patient was started on Ambien for insomnia but has held this medication for the past several weeks.      Pyschosocial: The patient lives alone. GI flareups have a siognificant impact on her quality of life.  She reports feeling overwhelmed and tired of being \"unreliable.\"  Patient has children and grandchildren in [facility] but does not get to see them often due to her illness.   She admits to worsening anxiety related to financial stress. Patient is interested in establishing care with a psychiatrist.      Goals:  Hoping to improve quality of life with management of symptoms. She is considering changing her code status/interventions due to poor quality of life.     The following portions of the patient's history were reviewed and updated as appropriate: allergies, current medications, past family history, past medical history, past social history, past surgical history and problem list.    ORT-R: Low risk  Decisional capacity: Full  ECOG: (1) Restricted in physically strenuous activity, ambulatory and able to do work of light nature     Objective:    Constitutional: Awake, alert, sitting up   Eyes: PERRLA, EOMS intact  HENT: NCAT, face symmetric  Neck: Supple, trachea midline  Respiratory: Nonlabored respirations  Musculoskeletal: Moves all extremities   Psychiatric: Appropriate affect, cooperative  Neurologic: Oriented x 3, Cranial Nerves grossly intact to confrontation, speech clear    Medication " Counts: Collected over the phone. See bottom of note for details. No misuse or overuse evident.   YADI:  #[id number]. Reviewed. All providers are part of the care team.  UDS (Y): Last 9/24/20. Order placed today.    Assessment & Plan:    1. Gastroparesis  --Temporary feeding tube placement scheduled for 5/20/22.     2. Chronic abdominal pain  --Next refill for Belbuca placed for 5/20/22. The copay has become too expensive for the patient. She is interested in finding a pain specialist that can manage oral analgesics in addition to her stimulator for better continuity of care. I will reach out to pain specialist, Dr. ESTEFANIA Knight to find out what happened with her last appointment.    3. Insomnia, unspecified type  --Continue Ambien 5 mg nightly.     4. Anxiety and depression  --Continue Zoloft 25 mg daily. Referral to behavioral health placed today.     Code status: Full code  Medical interventions: Full  Advanced directives: Defer    No follow-ups on file.    I spent 30 minutes caring for Renée Laurent on this date of service. This time includes time spent by me in the following activities: preparing for the visit, reviewing tests, obtaining and/or reviewing a separately obtained history, performing a medically appropriate examination and/or evaluation , counseling and educating the patient/family/caregiver, ordering medications, tests, or procedures, documenting information in the medical record, independently interpreting results and communicating that information with the patient/family/caregiver, and care coordination    Riddhi Maddox PA-C  04/22/2022    Medication Date Filled # Filled Count Used # Days  KASSIE   Ambien 10 4/21/22 30 30 0 1 0-1   Belbuca 450 4/20/22 60 59 1 2 2

## 2022-04-22 ENCOUNTER — TELEMEDICINE (OUTPATIENT)
Dept: PALLIATIVE CARE | Facility: CLINIC | Age: 62
End: 2022-04-22

## 2022-04-22 DIAGNOSIS — F32.A ANXIETY AND DEPRESSION: Primary | ICD-10-CM

## 2022-04-22 DIAGNOSIS — K31.84 GASTROPARESIS: Primary | ICD-10-CM

## 2022-04-22 DIAGNOSIS — G47.00 INSOMNIA, UNSPECIFIED TYPE: ICD-10-CM

## 2022-04-22 DIAGNOSIS — R10.9 CHRONIC ABDOMINAL PAIN: ICD-10-CM

## 2022-04-22 DIAGNOSIS — F32.A ANXIETY AND DEPRESSION: ICD-10-CM

## 2022-04-22 DIAGNOSIS — G89.29 CHRONIC ABDOMINAL PAIN: ICD-10-CM

## 2022-04-22 DIAGNOSIS — F41.9 ANXIETY AND DEPRESSION: ICD-10-CM

## 2022-04-22 DIAGNOSIS — F41.9 ANXIETY AND DEPRESSION: Primary | ICD-10-CM

## 2022-04-22 PROCEDURE — 99214 OFFICE O/P EST MOD 30 MIN: CPT | Performed by: PHYSICIAN ASSISTANT

## 2022-04-22 RX ORDER — BUPRENORPHINE HYDROCHLORIDE 450 UG/1
1 FILM, SOLUBLE BUCCAL 2 TIMES DAILY
Qty: 60 FILM | Refills: 0 | Status: SHIPPED | OUTPATIENT
Start: 2022-05-20 | End: 2022-05-20 | Stop reason: SDUPTHER

## 2022-04-22 NOTE — TELEPHONE ENCOUNTER
Mannie # [id number] and pill counts appropriate. Future refill for Belbuca 450 mcg films twice daily for 30 days sent to pharmacy. The patient will follow up in 1 month.

## 2022-05-13 ENCOUNTER — TELEPHONE (OUTPATIENT)
Dept: PALLIATIVE CARE | Facility: CLINIC | Age: 62
End: 2022-05-13

## 2022-05-13 NOTE — TELEPHONE ENCOUNTER
Caller: Renée Laurent    Relationship to patient: Self    Best call back number: [phone]        Type of visit: MYCHART VISIT     Requested date: 5/20/2022        Additional notes:PATIENT CALLED AND STATED SHE WOULD LIKE FOR HER 5/20 APPT WITH STARLA HALL TO BE MYCHART VISIT AND NOT IN-PERSON.

## 2022-05-16 DIAGNOSIS — F41.9 ANXIETY AND DEPRESSION: ICD-10-CM

## 2022-05-16 DIAGNOSIS — F32.A ANXIETY AND DEPRESSION: ICD-10-CM

## 2022-05-16 RX ORDER — SERTRALINE HYDROCHLORIDE 25 MG/1
25 TABLET, FILM COATED ORAL DAILY
Qty: 30 TABLET | Refills: 3 | Status: SHIPPED | OUTPATIENT
Start: 2022-05-16 | End: 2022-08-22 | Stop reason: SDUPTHER

## 2022-05-19 DIAGNOSIS — E10.9 TYPE 1 DIABETES MELLITUS WITHOUT COMPLICATIONS: ICD-10-CM

## 2022-05-19 RX ORDER — PERPHENAZINE 16 MG/1
TABLET, FILM COATED ORAL
Qty: 150 EACH | Refills: 2 | Status: SHIPPED | OUTPATIENT
Start: 2022-05-19 | End: 2022-06-06 | Stop reason: SDUPTHER

## 2022-05-19 RX ORDER — PERPHENAZINE 16 MG/1
TABLET, FILM COATED ORAL
OUTPATIENT
Start: 2022-05-19

## 2022-05-19 NOTE — PROGRESS NOTES
Palliative Clinic Note      Name: Renée Laurent  Age: 61 y.o.  Sex: female  : 1960  MRN: [id number]  Date of Service: 22  Mode of visit: Video   Location of patient: Home    The patient has chosen to receive care through a telehealth visit.   Does the patient consent to using video/audio connection for their medical care today? Yes   No technical issues occurred during this visit.     Subjective:    Chief Complaint: None    History of Present Illness: Renée Laurent is a 61 y.o. female with past medical history significant for gastroparesis, chronic pancreatitis, post pancreatectomy diabetes, status post splenectomy, hyperlipidemia, asthma, GERD, and mood disorder who presents via videochat today as a follow up for pain and symptom management.     Treatment summary: History of chronic pancreatitis status post pancreatectomy in  with autotransplantation of the islets.  Also suffers from gastroparesis with placement of gastric pacemaker in 2018.  Unfortunately, the abdominal pain, bloating, nausea/vomiting and weight loss continued despite intervention.  Symptoms limit activity outside of home and have caused her to miss many medical appointments in the past. Concurrent history of chronic cervical neck pain status post discectomy, fusion, and rhizotomy.  Status post implantation of a spinal stimulator by Dr. Kumar in 2020 with 60-70% improvement in pain. Upcoming appointment with pain specialist, Dr. ESTEFANIA Knight. The patient decided to defer temporary feeding tube for now.      Symptoms: The patient states she is doing much better now that the withdrawal symptoms have completely resolved.  She has decided to put off the feeding tube for now.  Specialist was uncertain if her previous surgeries would negatively impact placement of the feeding tube.  She is currently blending her food and doing okay.  Nausea pretty well managed on current regimen.  Pain well managed with  "spinal stimulator and Belbuca.  However, she is scheduled for a consult with a pain specialist.  Her co-pay for the Belbuca is $125 and she does not think she can afford this long term. She is interested in finding a pain specialist that can manage oral analgesics in addition to her stimulator for better continuity of care.  Patient is leaving better with Ambien 5 mg nightly.  She does not report any strange side effects with this medication.     Pyschosocial: The patient lives alone. GI flareups have a siognificant impact on her quality of life.  She reports feeling overwhelmed and tired of being \"unreliable.\"  Patient has children and grandchildren in [place] and [place] but does not get to see them often due to her illness.  Overall mood is stable with some intermittent anxiety.  Patient managing this with Zoloft 25 mg daily and non-pharmacotherapy. Patient is interested in establishing care with a psychiatrist.     Goals:  Hoping to improve quality of life with management of symptoms. She is considering changing her code status/interventions due to poor quality of life.    The following portions of the patient's history were reviewed and updated as appropriate: allergies, current medications, past family history, past medical history, past social history, past surgical history and problem list.    ORT-R: Low risk  Decisional capacity: Full  ECOG: (1) Restricted in physically strenuous activity, ambulatory and able to do work of light nature   Palliative Performance Scale Score: 80%     Objective:    Constitutional: Awake, alert, sitting up   Eyes: PERRLA, EOMS intact  HENT: NCAT, face symmetric  Neck: Supple, trachea midline  Respiratory: Nonlabored respirations  Musculoskeletal: Moves all extremities   Psychiatric: Appropriate affect, cooperative  Neurologic: Oriented x 3, Cranial Nerves grossly intact to confrontation, speech clear    Medication Counts: Collected over the phone. See bottom of note for " details. No misuse or overuse evident.   YADI:  #[id number]. Reviewed. All providers are part of the care team.  UDS (Y): Last 9/24/20. Order placed today.    Assessment & Plan:    1. Gastroparesis  --Holding off on temporary feeding tube. Symptoms managed with current regimen.     2. Chronic abdominal pain  --Continue Belbuca 450 mcg films twice daily. Refill sent to pharmacy. Patient is scheduled for an upcoming consult with a pain specialist.  She is interested in finding a pain specialist that can manage oral analgesics in addition to her stimulator for better continuity of care.     3. Insomnia, unspecified type  --Improved with Ambien 5 mg nightly. Refill sent to pharmacy.     4. Anxiety  --Stable mood. Continue Zoloft 25 mg daily. Will check on referral to behavioral health placed at last visit.     Code status: Full code  Medical interventions: Full  Advanced directives: Defer    Return in about 1 month (around 6/20/2022), or 1:00, for Video visit.    I spent 30 minutes caring for Renée Laurent on this date of service. This time includes time spent by me in the following activities: preparing for the visit, reviewing tests, obtaining and/or reviewing a separately obtained history, performing a medically appropriate examination and/or evaluation , counseling and educating the patient/family/caregiver, ordering medications, tests, or procedures, documenting information in the medical record, independently interpreting results and communicating that information with the patient/family/caregiver, and care coordination    Riddhi Maddox PA-C  05/20/2022    Medication Date Filled # Filled Count Used # Days  KASSIE   Belbuca 450 4/20/22 60 1 59 30 2   Ambien 5 5/3/22 30   17 1

## 2022-05-20 ENCOUNTER — PATIENT MESSAGE (OUTPATIENT)
Dept: PALLIATIVE CARE | Facility: CLINIC | Age: 62
End: 2022-05-20

## 2022-05-20 ENCOUNTER — TELEMEDICINE (OUTPATIENT)
Dept: PALLIATIVE CARE | Facility: CLINIC | Age: 62
End: 2022-05-20

## 2022-05-20 DIAGNOSIS — G47.00 INSOMNIA, UNSPECIFIED TYPE: ICD-10-CM

## 2022-05-20 DIAGNOSIS — R10.9 CHRONIC ABDOMINAL PAIN: ICD-10-CM

## 2022-05-20 DIAGNOSIS — G89.29 CHRONIC ABDOMINAL PAIN: ICD-10-CM

## 2022-05-20 DIAGNOSIS — K31.84 GASTROPARESIS: Primary | ICD-10-CM

## 2022-05-20 DIAGNOSIS — F41.9 ANXIETY: ICD-10-CM

## 2022-05-20 PROCEDURE — 99213 OFFICE O/P EST LOW 20 MIN: CPT | Performed by: PHYSICIAN ASSISTANT

## 2022-05-20 RX ORDER — BUPRENORPHINE HYDROCHLORIDE 450 UG/1
1 FILM, SOLUBLE BUCCAL 2 TIMES DAILY
Qty: 60 FILM | Refills: 0 | Status: SHIPPED | OUTPATIENT
Start: 2022-06-19 | End: 2022-07-19

## 2022-05-20 RX ORDER — ZOLPIDEM TARTRATE 5 MG/1
5 TABLET ORAL NIGHTLY PRN
Qty: 30 TABLET | Refills: 0 | Status: SHIPPED | OUTPATIENT
Start: 2022-06-02 | End: 2022-06-20 | Stop reason: SDUPTHER

## 2022-05-20 NOTE — TELEPHONE ENCOUNTER
Mannie # [id number] and counts reviewed.  Refills for Belbuca 450 mcg twice daily and Ambien 5 mg nightly sent to pharmacy.  Patient scheduled to follow-up in 4 weeks.

## 2022-06-06 RX ORDER — PERPHENAZINE 16 MG/1
TABLET, FILM COATED ORAL
Qty: 150 EACH | Refills: 11 | Status: SHIPPED | OUTPATIENT
Start: 2022-06-06

## 2022-06-17 NOTE — PROGRESS NOTES
Palliative Clinic Note      Name: Renée Laurent  Age: 61 y.o.  Sex: female  : 1960  MRN: [id number]  Date of Service: 22  Mode of visit: Video   Location of patient: Home    The patient has chosen to receive care through a telehealth visit.   Does the patient consent to using video/audio connection for their medical care today? Yes   No technical issues occurred during this visit.     Subjective:    Chief Complaint: Belbuca taper    History of Present Illness: Renée Laurent is a 61 y.o. female with past medical history significant for gastroparesis, chronic pancreatitis, post pancreatectomy diabetes, status post splenectomy, hyperlipidemia, asthma, GERD, and mood disorder who presents via videochat today as a follow up for pain and symptom management.     Treatment summary: History of chronic pancreatitis status post pancreatectomy in  with autotransplantation of the islets.  Also suffers from gastroparesis with placement of gastric pacemaker in 2018.  Unfortunately, the abdominal pain, bloating, nausea/vomiting and weight loss continued despite intervention.  Symptoms limit activity outside of home and have caused her to miss many medical appointments in the past. Concurrent history of chronic cervical neck pain status post discectomy, fusion, and rhizotomy.  Status post implantation of a spinal stimulator by Dr. Kumar in  with 60-70% improvement in pain. The patient decided to defer temporary feeding tube for now.      Symptoms: The patient reports her pain is well managed with spinal stimulator and Belbuca.  However, her co-pay for the Belbuca has become too expensive and she has begun to taper herself off of the medication. She is currently using 1 450 mcg film per day. She denies withdrawal symptoms. The patient was referred to pain specialist Dr. ESTEFANIA Knight, however decided this was not the route she wanted to go. Their clinic policy stated the patient would  "be dismissed for > 3 missed appointments and she has a hard time making appointments due to medical illnesses.  The patient continues to blend her food.  Nausea managed on current regimen.  Patient is able to fall alseep with the Ambien 5 mg nightly but unfortunately waking up during the night due to low blood sugar. She is scheduled to follow up with endocrinologist for this.        Pyschosocial: The patient lives alone. GI flareups have a significant impact on her quality of life.  She reports feeling overwhelmed and tired of being \"unreliable.\"  Patient has children and grandchildren in [facility] but does not get to see them often due to her illness.  Overall mood is stable with some intermittent anxiety.  Patient managing this with Zoloft 25 mg daily and non-pharmacotherapy. Patient is scheduled to establish care with psychiatrist/therapist via telehealth later this afternoon.      Goals:  Hoping to improve quality of life with management of symptoms.     The following portions of the patient's history were reviewed and updated as appropriate: allergies, current medications, past family history, past medical history, past social history, past surgical history and problem list.    ORT-R: Low risk  Decisional capacity: Full  ECOG: (1) Restricted in physically strenuous activity, ambulatory and able to do work of light nature   Palliative Performance Scale Score: 70%     Objective:    Constitutional: Awake, alert, sitting up   Eyes: PERRLA, EOMS intact  HENT: NCAT, face symmetric  Neck: Supple, trachea midline  Respiratory: Nonlabored respirations  Musculoskeletal: Moves all extremities   Psychiatric: Appropriate affect, cooperative  Neurologic: Oriented x 3, Cranial Nerves grossly intact to confrontation, speech clear    Medication Counts: Collected over the phone. See bottom of note for details. No misuse or overuse evident.   [facility] [place] [facility] #[id number]. "   UDS (Y): Last 9/24/20. Patient is aware this needs to be collected.     Assessment & Plan:    1. Chronic abdominal pain  --patient's co-pay for the Belbuca has become too expensive and she has begun to taper herself off of the medication. She is currently using one 450 mcg film per day. She denies withdrawal symptoms. Next refill waiting for patient at the pharmacy. Patient will reach out in 1 month to let me know if she would like to continue the taper or stabilize at current dose.     2. Anxiety  --Overall mood is stable with some intermittent anxiety.  Patient managing this with Zoloft 25 mg daily and non-pharmacotherapy. Patient is scheduled to establish care with psychiatrist/therapist via telehealth later this afternoon.     3. Insomnia  --Refill for Ambien placed today.      Code status: Full code  Medical interventions: Full  Advanced directives: Defer    Return in about 2 months (around 8/20/2022) for Video visit.    I spent 30 minutes caring for Renée Laurent on this date of service. This time includes time spent by me in the following activities: preparing for the visit, reviewing tests, obtaining and/or reviewing a separately obtained history, performing a medically appropriate examination and/or evaluation , counseling and educating the patient/family/caregiver, ordering medications, tests, or procedures, documenting information in the medical record, independently interpreting results and communicating that information with the patient/family/caregiver, and care coordination    Riddhi Maddox PA-C  06/20/2022    Medication Date Filled # Filled Count Used # Days  KASSIE   Belbuca 450 5/21/22 60 1 59 30 1-2   Ambien 5 6/1/22 30 9 21 19 1

## 2022-06-20 ENCOUNTER — TELEMEDICINE (OUTPATIENT)
Dept: PALLIATIVE CARE | Facility: CLINIC | Age: 62
End: 2022-06-20

## 2022-06-20 VITALS — HEIGHT: 65 IN | WEIGHT: 135 LBS | BODY MASS INDEX: 22.49 KG/M2

## 2022-06-20 DIAGNOSIS — G47.00 INSOMNIA, UNSPECIFIED TYPE: ICD-10-CM

## 2022-06-20 DIAGNOSIS — R10.9 CHRONIC ABDOMINAL PAIN: Primary | ICD-10-CM

## 2022-06-20 DIAGNOSIS — F41.9 ANXIETY: ICD-10-CM

## 2022-06-20 DIAGNOSIS — G89.29 CHRONIC ABDOMINAL PAIN: Primary | ICD-10-CM

## 2022-06-20 PROCEDURE — 99214 OFFICE O/P EST MOD 30 MIN: CPT | Performed by: PHYSICIAN ASSISTANT

## 2022-06-20 RX ORDER — ZOLPIDEM TARTRATE 5 MG/1
5 TABLET ORAL NIGHTLY PRN
Qty: 30 TABLET | Refills: 0 | Status: SHIPPED | OUTPATIENT
Start: 2022-06-30 | End: 2022-08-04

## 2022-06-24 DIAGNOSIS — K31.84 GASTROPARESIS: ICD-10-CM

## 2022-06-24 RX ORDER — ONDANSETRON 4 MG/1
TABLET, FILM COATED ORAL
Qty: 60 TABLET | Refills: 0 | Status: SHIPPED | OUTPATIENT
Start: 2022-06-24 | End: 2022-08-04

## 2022-07-25 DIAGNOSIS — G89.29 CHRONIC ABDOMINAL PAIN: Primary | ICD-10-CM

## 2022-07-25 DIAGNOSIS — R10.9 CHRONIC ABDOMINAL PAIN: Primary | ICD-10-CM

## 2022-07-25 RX ORDER — BUPRENORPHINE HYDROCHLORIDE 300 UG/1
300 FILM, SOLUBLE BUCCAL DAILY
Qty: 30 EACH | Refills: 0 | Status: SHIPPED | OUTPATIENT
Start: 2022-08-19 | End: 2022-08-22 | Stop reason: SDUPTHER

## 2022-07-25 NOTE — TELEPHONE ENCOUNTER
I have reviewed patient's YADI report prior to prescribing Schedule II, III, and IV medications. Request # [id number]. Refill for Belbuca 300 mcg films daily sent to pharmacy. Patient is scheduled to follow-up on 8/15/22.

## 2022-08-04 DIAGNOSIS — K31.84 GASTROPARESIS: ICD-10-CM

## 2022-08-04 DIAGNOSIS — G47.00 INSOMNIA, UNSPECIFIED TYPE: ICD-10-CM

## 2022-08-04 RX ORDER — ONDANSETRON 4 MG/1
TABLET, FILM COATED ORAL
Qty: 60 TABLET | Refills: 0 | Status: SHIPPED | OUTPATIENT
Start: 2022-08-04 | End: 2022-10-17 | Stop reason: SDUPTHER

## 2022-08-04 RX ORDER — ZOLPIDEM TARTRATE 5 MG/1
TABLET ORAL
Qty: 30 TABLET | Refills: 0 | Status: SHIPPED | OUTPATIENT
Start: 2022-08-04 | End: 2022-08-22 | Stop reason: SDUPTHER

## 2022-08-19 NOTE — PROGRESS NOTES
Palliative Clinic Note      Name: Renée Laurent  Age: 61 y.o.  Sex: female  : 1960  MRN: [id number]  Date of Service: 22  Mode of visit: Video   Location of patient: Home    The patient has chosen to receive care through a telehealth visit.   Does the patient consent to using video/audio connection for their medical care today? Yes   No technical issues occurred during this visit.     Subjective:    Chief Complaint: Increased pain     History of Present Illness: Renée Laurent is a 61 y.o. female with past medical history significant for gastroparesis, chronic pancreatitis, post pancreatectomy diabetes, status post splenectomy, hyperlipidemia, asthma, GERD, and mood disorder who presents via videochat today as a follow up for pain and symptom management.     Treatment summary: History of chronic pancreatitis status post pancreatectomy in  with autotransplantation of the islets.  Also suffers from gastroparesis with placement of gastric pacemaker in 2018.  Unfortunately, the abdominal pain, bloating, nausea/vomiting and weight loss continued despite intervention.  Symptoms limit activity outside of home and have caused her to miss many medical appointments in the past. Concurrent history of chronic cervical neck pain status post discectomy, fusion, and rhizotomy.  Status post implantation of a spinal stimulator by Dr. Kumar in 2020 with 60-70% improvement in pain. The patient decided to defer temporary feeding tube for now.     Pain: Patient's co-pay for the Belbuca has become too expensive and she would like to attempt tapering off of the medication.The patient was referred to pain specialist Dr. ESTEFANIA Knight, however decided this was not the route she wanted to go. Their clinic policy stated the patient would be dismissed for > 3 missed appointments and she has a hard time making appointments due to her complex medical history. The patient reports an increase in her pain  "over the past several days. She tapered down to Belbuca 300 mcg film daily on 8/19/22. No other withdrawal symptoms.      Other symptoms: The patient notes increased nausea and vomiting. She feels this is consistent with a gastroparesis flare up. The patient continues to blend her food but was previously able to add back some solids. Normal BMs.  Patient is able to fall alseep with the Ambien 5 mg nightly but unfortunately continues to wake up during the night. Patient frustrated with hair loss secondary to nutrition deficienices.      Pyschosocial: The patient lives alone. GI flareups have a significant impact on her quality of life.  She reports feeling overwhelmed and tired of being \"unreliable.\"  Patient has children and grandchildren in [facility] but does not get to see them often due to her illness.  Overall mood is stable with some intermittent anxiety.  Patient managing this with Zoloft 25 mg daily and non-pharmacotherapy. Patient follows with a psychiatrist.      Goals:  Hoping to improve quality of life with management of symptoms.     The following portions of the patient's history were reviewed and updated as appropriate: allergies, current medications, past family history, past medical history, past social history, past surgical history and problem list.    ORT-R: Low risk   Decisional capacity: Full  ECOG: (1) Restricted in physically strenuous activity, ambulatory and able to do work of light nature   Palliative Performance Scale Score: 70%     Objective:    Constitutional: Awake, alert, sitting up   Eyes: PERRLA, EOMS intact  HENT: NCAT, face symmetric  Neck: Supple, trachea midline  Respiratory: Nonlabored respirations  Musculoskeletal: Moves all extremities   Psychiatric: Appropriate affect, cooperative  Neurologic: Oriented x 3, Cranial Nerves grossly intact to confrontation, speech clear    Medication Counts: Collected over the phone. See bottom of note for details. No misuse or " overuse evident.   I have reviewed the patient's [place] PDMP. YADI Req #[id number].   UDS (Y): Last 9/24/20. Patient is aware this needs to be collected.      Assessment & Plan:    1. Chronic abdominal pain  - Continue Belbuca 300 mcg film daily. Some increased pain that may be withdrawal. Plan to stay at this dose for at least 2 months to allow her body to normalize before continuing the taper.     2. Insomnia, unspecified type  - Continue Ambien 5 mg nightly.     3. Anxiety and depression  - Continue Zoloft 25 mg daily.     Code status: Full code  Medical interventions: Full  Advanced directives: Defer    Return in about 8 weeks (around 10/17/2022) for Video visit.    I spent 30 minutes caring for Renée Laurent on this date of service. This time includes time spent by me in the following activities: preparing for the visit, reviewing tests, obtaining and/or reviewing a separately obtained history, performing a medically appropriate examination and/or evaluation , counseling and educating the patient/family/caregiver, ordering medications, tests, or procedures, documenting information in the medical record, independently interpreting results and communicating that information with the patient/family/caregiver, and care coordination    Riddhi Maddox PA-C  08/22/2022    Medication Date Filled # Filled Count Used # Days  KASSIE   Ambien 5 8/4/22 30 --  18 1   Belbuca 300 8/19/22 30 25  26 1

## 2022-08-20 ENCOUNTER — PATIENT MESSAGE (OUTPATIENT)
Dept: INTERNAL MEDICINE | Facility: CLINIC | Age: 62
End: 2022-08-20

## 2022-08-20 DIAGNOSIS — L65.9 HAIR LOSS: Primary | ICD-10-CM

## 2022-08-22 ENCOUNTER — TELEMEDICINE (OUTPATIENT)
Dept: PALLIATIVE CARE | Facility: CLINIC | Age: 62
End: 2022-08-22

## 2022-08-22 VITALS — WEIGHT: 137 LBS | BODY MASS INDEX: 22.82 KG/M2 | HEIGHT: 65 IN

## 2022-08-22 DIAGNOSIS — F41.9 ANXIETY AND DEPRESSION: ICD-10-CM

## 2022-08-22 DIAGNOSIS — G47.00 INSOMNIA, UNSPECIFIED TYPE: ICD-10-CM

## 2022-08-22 DIAGNOSIS — G89.29 CHRONIC ABDOMINAL PAIN: Primary | ICD-10-CM

## 2022-08-22 DIAGNOSIS — R10.9 CHRONIC ABDOMINAL PAIN: ICD-10-CM

## 2022-08-22 DIAGNOSIS — G89.29 CHRONIC ABDOMINAL PAIN: ICD-10-CM

## 2022-08-22 DIAGNOSIS — R10.9 CHRONIC ABDOMINAL PAIN: Primary | ICD-10-CM

## 2022-08-22 DIAGNOSIS — F32.A ANXIETY AND DEPRESSION: ICD-10-CM

## 2022-08-22 PROCEDURE — 99214 OFFICE O/P EST MOD 30 MIN: CPT | Performed by: PHYSICIAN ASSISTANT

## 2022-08-22 RX ORDER — BUPRENORPHINE HYDROCHLORIDE 300 UG/1
300 FILM, SOLUBLE BUCCAL DAILY
Qty: 30 EACH | Refills: 0 | Status: SHIPPED | OUTPATIENT
Start: 2022-09-17 | End: 2022-10-14 | Stop reason: SDUPTHER

## 2022-08-22 RX ORDER — ZOLPIDEM TARTRATE 5 MG/1
5 TABLET ORAL NIGHTLY PRN
Qty: 30 TABLET | Refills: 0 | Status: SHIPPED | OUTPATIENT
Start: 2022-09-03 | End: 2022-10-14

## 2022-08-22 RX ORDER — SERTRALINE HYDROCHLORIDE 25 MG/1
25 TABLET, FILM COATED ORAL DAILY
Qty: 30 TABLET | Refills: 3 | Status: SHIPPED | OUTPATIENT
Start: 2022-08-22 | End: 2023-01-10

## 2022-08-22 NOTE — TELEPHONE ENCOUNTER
I have reviewed patient's YADI report prior to prescribing Schedule II, III, and IV medications. Request # [id number]. Refill for Belbuca 300 mcg film daily for 30 days #30 sent to pharmacy. Patient will follow up in 2 months.

## 2022-08-22 NOTE — TELEPHONE ENCOUNTER
From: Renée Laurent  To: Karla Torres MD  Sent: 8/20/2022 1:01 PM EDT  Subject: Hair loss question re Minoxidil    I recently talked with a dermatologist (not my doctor - an acquaintance) and she told me that women dealing with hair loss are being prescribed oral Minoxidil instead of the topical version. It is available in 2.5mg, 5mg and 10mg tablets. She uses the Loniten brand ([employer] A common starting dose is 0.625mg per day for women and 1.25mg per day for men. She said to get a tablet cutter from my pharmacy and cut a 2.5mg tablet into quarters or halves to get this dose. I’ve stopped using the topical version because I started getting a rash on my scalp and would like to try the oral dose instead.     Can you do this for me? I don’t want to incur the costs of seeing a dermatologist etc.    Thanks so much. Renée

## 2022-08-23 RX ORDER — MINOXIDIL 2.5 MG/1
TABLET ORAL
Qty: 10 TABLET | Refills: 1 | Status: SHIPPED | OUTPATIENT
Start: 2022-08-23 | End: 2022-10-17 | Stop reason: ALTCHOICE

## 2022-08-26 DIAGNOSIS — E10.9 TYPE 1 DIABETES MELLITUS WITHOUT COMPLICATIONS: ICD-10-CM

## 2022-08-26 DIAGNOSIS — E89.1 POST-PANCREATECTOMY DIABETES: ICD-10-CM

## 2022-08-26 DIAGNOSIS — E13.9 POST-PANCREATECTOMY DIABETES: ICD-10-CM

## 2022-08-26 DIAGNOSIS — Z90.410 POST-PANCREATECTOMY DIABETES: ICD-10-CM

## 2022-08-26 RX ORDER — INSULIN PUMP CART,CONT INF,RF
CARTRIDGE (EA) SUBCUTANEOUS
Qty: 30 EACH | Refills: 1 | Status: SHIPPED | OUTPATIENT
Start: 2022-08-26 | End: 2023-01-16

## 2022-10-14 DIAGNOSIS — R10.9 CHRONIC ABDOMINAL PAIN: ICD-10-CM

## 2022-10-14 DIAGNOSIS — G47.00 INSOMNIA, UNSPECIFIED TYPE: ICD-10-CM

## 2022-10-14 DIAGNOSIS — G89.29 CHRONIC ABDOMINAL PAIN: ICD-10-CM

## 2022-10-14 RX ORDER — BUPRENORPHINE HYDROCHLORIDE 300 UG/1
300 FILM, SOLUBLE BUCCAL DAILY
Qty: 30 EACH | Refills: 0 | Status: SHIPPED | OUTPATIENT
Start: 2022-10-15 | End: 2022-10-17 | Stop reason: DRUGHIGH

## 2022-10-14 RX ORDER — ZOLPIDEM TARTRATE 5 MG/1
TABLET ORAL
Qty: 30 TABLET | Refills: 0 | Status: SHIPPED | OUTPATIENT
Start: 2022-10-14 | End: 2022-11-11

## 2022-10-14 NOTE — TELEPHONE ENCOUNTER
YADI #: [id number]    Medication requested: Belbuca 300mcg    Last fill date: 09/13/2022    Last appointment:08/22/2022    Next appointment: 10/17/2022

## 2022-10-14 NOTE — TELEPHONE ENCOUNTER
YADI #:[id number]    Medication requested: Ambien 5mg    Last fill date: 09/10/22    Last appointment: 08/22/22    Next appointment: 10/17/2022

## 2022-10-17 ENCOUNTER — TELEMEDICINE (OUTPATIENT)
Dept: INTERNAL MEDICINE | Facility: CLINIC | Age: 62
End: 2022-10-17

## 2022-10-17 ENCOUNTER — TELEMEDICINE (OUTPATIENT)
Dept: PALLIATIVE CARE | Facility: CLINIC | Age: 62
End: 2022-10-17

## 2022-10-17 VITALS — HEIGHT: 65 IN | BODY MASS INDEX: 22.82 KG/M2 | WEIGHT: 137 LBS

## 2022-10-17 DIAGNOSIS — G47.00 INSOMNIA, UNSPECIFIED TYPE: ICD-10-CM

## 2022-10-17 DIAGNOSIS — Z90.410 POST-PANCREATECTOMY DIABETES: ICD-10-CM

## 2022-10-17 DIAGNOSIS — G89.29 CHRONIC ABDOMINAL PAIN: Primary | ICD-10-CM

## 2022-10-17 DIAGNOSIS — L29.9 ITCHING: ICD-10-CM

## 2022-10-17 DIAGNOSIS — R10.9 CHRONIC ABDOMINAL PAIN: Primary | ICD-10-CM

## 2022-10-17 DIAGNOSIS — E13.9 POST-PANCREATECTOMY DIABETES: ICD-10-CM

## 2022-10-17 DIAGNOSIS — K21.00 GASTROESOPHAGEAL REFLUX DISEASE WITH ESOPHAGITIS WITHOUT HEMORRHAGE: ICD-10-CM

## 2022-10-17 DIAGNOSIS — E89.1 POST-PANCREATECTOMY DIABETES: ICD-10-CM

## 2022-10-17 DIAGNOSIS — R11.2 NAUSEA AND VOMITING, UNSPECIFIED VOMITING TYPE: Primary | ICD-10-CM

## 2022-10-17 DIAGNOSIS — K31.84 GASTROPARESIS: ICD-10-CM

## 2022-10-17 PROCEDURE — 99214 OFFICE O/P EST MOD 30 MIN: CPT | Performed by: PHYSICIAN ASSISTANT

## 2022-10-17 PROCEDURE — 99214 OFFICE O/P EST MOD 30 MIN: CPT | Performed by: FAMILY MEDICINE

## 2022-10-17 RX ORDER — ONDANSETRON 4 MG/1
8 TABLET, FILM COATED ORAL EVERY 8 HOURS PRN
Qty: 60 TABLET | Refills: 2 | Status: SHIPPED | OUTPATIENT
Start: 2022-10-17 | End: 2022-12-27 | Stop reason: SDUPTHER

## 2022-10-17 RX ORDER — BUPRENORPHINE HYDROCHLORIDE 75 UG/1
1 FILM, SOLUBLE BUCCAL DAILY
Qty: 30 EACH | Refills: 0 | Status: SHIPPED | OUTPATIENT
Start: 2022-11-16 | End: 2022-10-17 | Stop reason: DRUGHIGH

## 2022-10-17 RX ORDER — PANTOPRAZOLE SODIUM 40 MG/1
40 TABLET, DELAYED RELEASE ORAL DAILY
Qty: 90 TABLET | Refills: 1 | Status: SHIPPED | OUTPATIENT
Start: 2022-10-17

## 2022-10-17 RX ORDER — BUPRENORPHINE HYDROCHLORIDE 150 UG/1
1 FILM, SOLUBLE BUCCAL DAILY
Qty: 30 EACH | Refills: 0 | Status: SHIPPED | OUTPATIENT
Start: 2022-11-16 | End: 2022-10-17 | Stop reason: DRUGHIGH

## 2022-10-17 RX ORDER — BUPRENORPHINE HYDROCHLORIDE 300 UG/1
1 FILM, SOLUBLE BUCCAL DAILY
COMMUNITY
Start: 2022-10-17 | End: 2022-10-17 | Stop reason: DRUGHIGH

## 2022-10-17 NOTE — TELEPHONE ENCOUNTER
I have reviewed patient's YADI report prior to prescribing Schedule II, III, and IV medications. Request # [id number]. Next refill for Belbuca 225 (150 +75 mcg films) sent to the pharmacy. The patient is scheduled to f/u in 2 month.

## 2022-10-17 NOTE — PROGRESS NOTES
Subjective   Renée Laurent is a 61 y.o. female.     Chief Complaint   Patient presents with   • Heartburn   • Nausea     You have chosen to receive care through a telehealth visit.  Do you consent to use a video/audio connection for your medical care today? Yes    This was an audio and video enabled telemedicine encounter.  Pt is in palliative care.  Pt has been seeing Endocrine for DM1.      Pt is home in [facility] and I am in my office in [facility].   Visit Vitals  LMP  (LMP Unknown) Comment: scheduled for mammo         Nausea  This is a recurrent problem. The current episode started more than 1 year ago. The problem occurs constantly. The problem has been unchanged. Associated symptoms include abdominal pain (chronic), anorexia, congestion, coughing, fatigue, a fever, nausea and vomiting. Pertinent negatives include no arthralgias, change in bowel habit, chest pain, chills, diaphoresis, headaches, joint swelling, myalgias, neck pain, numbness, rash, sore throat, swollen glands, urinary symptoms, vertigo, visual change or weakness. The symptoms are aggravated by eating. Treatments tried: zofran. The treatment provided moderate relief.   Heartburn  She complains of abdominal pain (chronic), coughing, early satiety, globus sensation, heartburn, nausea and tooth decay. She reports no belching, no chest pain, no choking, no dysphagia, no hoarse voice, no sore throat, no stridor, no water brash or no wheezing. This is a chronic problem. The current episode started more than 1 year ago. The problem occurs constantly. The problem has been unchanged. The heartburn duration is an hour (with protonix). The heartburn is located in the substernum. The heartburn is of moderate intensity. The heartburn wakes her from sleep. The heartburn does not limit her activity. The heartburn changes with position. The symptoms are aggravated by certain foods, lying down and bending. Associated symptoms include fatigue and  weight loss. Pertinent negatives include no anemia, melena, muscle weakness or orthopnea. Risk factors: gastroparesis. She has tried a PPI for the symptoms. The treatment provided moderate relief. Past invasive treatments do not include gastroplasty, gastroplication or reflux surgery.      Pt recently picked up respiratory infection and has tested negative for Covid.   The following portions of the patient's history were reviewed and updated as appropriate: allergies, current medications, past family history, past medical history, past social history, past surgical history and problem list.    Past Medical History:   Diagnosis Date   • Abdominal pain    • Absence of pancreas, acquired    • Allergic    • Anemia    • Anxiety    • Asthma    • Basal cell carcinoma    • Bile reflux gastritis    • Chronic illness    • Contact dermatitis     due to plants   • Depression    • Diabetes mellitus (HCC)    • Diabetes mellitus type I (HCC) 2011    Type 3c - surgically induced   • Eating disorder     Resolved   • Encounter for dental examination 07/2014   • Gastroparesis    • GERD (gastroesophageal reflux disease)    • History of medical problems 12/1/2011    Gastroparesis   • History of MRSA infection     [facility] 2009 abdominal incisional infection   • Hyperlipidemia    • Hypoglycemia 2011   • Incisional hernia    • Insomnia    • Iron deficiency    • Kidney stone     Left kidney   • Myeloid leukemia (HCC)    • Osteopenia    • Osteoporosis 2015    Osteopenia   • Pancreatitis    • Pneumonia 1/1/2017   • PONV (postoperative nausea and vomiting)    • Spinal headache    • Vitamin D deficiency    • Wears glasses       Past Surgical History:   Procedure Laterality Date   • ABDOMINAL SURGERY      x 10   • CERVICAL DISCECTOMY ANTERIOR  1997    ACDF w/ fusion -Dr. Jerald Lopez   • CHOLECYSTECTOMY     • CHOLECYSTECTOMY     • COLONOSCOPY     • COSMETIC SURGERY      Rhinoplasty   • GASTRIC STIMULATOR IMPLANT SURGERY  12/17/2018    [place]  [facility]   • HERNIA MESH REMOVAL  2014    abdominal hernia with mesh    • HYSTERECTOMY     • OTHER SURGICAL HISTORY      duodeum removed   • OTHER SURGICAL HISTORY      jejunem removed   • PANCREATECTOMY     • SMALL INTESTINE SURGERY  12/1/2011    Tp-ait   • SPINAL CORD STIMULATOR IMPLANT N/A 5/10/2021    Procedure: SPINAL CORD STIMULATOR INSERTION;  Surgeon: Kedar Estevez MD;  [facility]: [facility];  Service: Neurosurgery;  Laterality: N/A;   • TOTAL ABDOMINAL HYSTERECTOMY WITH SALPINGO OOPHORECTOMY     • TUBAL ABDOMINAL LIGATION     • UPPER GASTROINTESTINAL ENDOSCOPY  10/25/2017    Dr Mehta, gastritis, eosinphils in esophagus      Family History   Problem Relation Age of Onset   • Osteoporosis Mother    • Cancer Mother         CLL   • Hashimoto's thyroiditis Mother    • Arthritis Mother    • Depression Mother    • Thyroid disease Mother         Hashimoto’s   • Lung cancer Father    • Alcohol abuse Father    • Cancer Father         Lung   • Liver disease Father    • Asthma Sister       Social History     Socioeconomic History   • Marital status: Single   Tobacco Use   • Smoking status: Never   • Smokeless tobacco: Never   Vaping Use   • Vaping Use: Never used   Substance and Sexual Activity   • Alcohol use: No   • Drug use: No   • Sexual activity: Not Currently     Partners: Male     Birth control/protection: Post-menopausal      Allergies   Allergen Reactions   • Aspirin Hives   • Phenergan [Promethazine Hcl]      Anxiety/relessness   • Reglan [Metoclopramide]      Involuntary movement       Review of Systems   Constitutional: Positive for fatigue, fever and weight loss. Negative for chills and diaphoresis.   HENT: Positive for congestion. Negative for hoarse voice and sore throat.    Respiratory: Positive for cough. Negative for choking and wheezing.    Cardiovascular: Negative for chest pain.   Gastrointestinal: Positive for abdominal pain (chronic), anorexia, heartburn, nausea and vomiting. Negative  for change in bowel habit, dysphagia and melena.   Musculoskeletal: Negative for arthralgias, joint swelling, myalgias, muscle weakness and neck pain.   Skin: Negative for rash.   Neurological: Negative for vertigo, weakness, numbness and headaches.       Objective   Physical Exam  Constitutional:       Appearance: Normal appearance.      Comments: Video visit   HENT:      Head: Normocephalic.   Eyes:      Extraocular Movements: Extraocular movements intact.   Pulmonary:      Effort: Pulmonary effort is normal. No respiratory distress.   Musculoskeletal:      Cervical back: Normal range of motion.   Neurological:      Mental Status: She is alert and oriented to person, place, and time.   Psychiatric:         Mood and Affect: Mood normal.         Behavior: Behavior normal.         Thought Content: Thought content normal.         Judgment: Judgment normal.         Assessment & Plan   Diagnoses and all orders for this visit:    1. Nausea and vomiting, unspecified vomiting type (Primary)  -     ondansetron (ZOFRAN) 4 MG tablet; Take 2 tablets by mouth Every 8 (Eight) Hours As Needed for Nausea or Vomiting.  Dispense: 60 tablet; Refill: 2    2. Gastroparesis  -     ondansetron (ZOFRAN) 4 MG tablet; Take 2 tablets by mouth Every 8 (Eight) Hours As Needed for Nausea or Vomiting.  Dispense: 60 tablet; Refill: 2    3. Post-pancreatectomy diabetes (HCC)    4. Gastroesophageal reflux disease with esophagitis without hemorrhage  -     pantoprazole (PROTONIX) 40 MG EC tablet; Take 1 tablet by mouth Daily.  Dispense: 90 tablet; Refill: 1      Pt has enough Creon, for post pancreatectomy gastroparesis.              Current Outpatient Medications:   •  ondansetron (ZOFRAN) 4 MG tablet, Take 2 tablets by mouth Every 8 (Eight) Hours As Needed for Nausea or Vomiting., Disp: 60 tablet, Rfl: 2  •  pantoprazole (PROTONIX) 40 MG EC tablet, Take 1 tablet by mouth Daily., Disp: 90 tablet, Rfl: 1  •  ALPHA LIPOIC ACID PO, Take 400 mg by  mouth Daily., Disp: , Rfl:   •  B Complex Vitamins (VITAMIN B COMPLEX PO), Take 1 tablet by mouth Daily., Disp: , Rfl:   •  Biotin 37889 MCG tablet, Take 1 tablet by mouth Daily., Disp: , Rfl:   •  busPIRone (BUSPAR) 5 MG tablet, Take 1 tablet by mouth 2 (Two) Times a Day., Disp: 180 tablet, Rfl: 1  •  Continuous Blood Gluc Sensor (Dexcom G6 Sensor), 1 each Take As Directed., Disp: 3 each, Rfl: 11  •  Continuous Blood Gluc Transmit (Dexcom G6 Transmitter) misc, 1 each Take As Directed., Disp: 1 each, Rfl: 3  •  glucagon (GLUCAGEN) 1 MG injection, Inject 1 mg into the appropriate muscle as directed by prescriber 1 (One) Time As Needed for Low Blood Sugar for up to 1 dose., Disp: 1 kit, Rfl: 11  •  glucose blood (Contour Next Test) test strip, TEST 4 TIMES DAILY dx:  e10.65, Disp: 150 each, Rfl: 11  •  Insulin Disposable Pump (Omnipod Classic Pods, Gen 3,) misc, USE 1 POD EVERY THIRD DAY, Disp: 30 each, Rfl: 1  •  insulin lispro (humaLOG) 100 UNIT/ML injection, USE IN PUMP 0.45 UNITS PER HOUR BASAL DOSE WITH 2 UNITS PER 30 GRAMS OF CARB BOLUS, WITH MEALS OR SNACKS.  MAX DAILY DOSE OF 45 UNITS., Disp: 40 mL, Rfl: 1  •  MAGNESIUM GLYCINATE PLUS PO, Take 1 tablet by mouth Daily., Disp: , Rfl:   •  Medium Chain Triglycerides (MCT OIL PO), Take 15 mL by mouth Daily., Disp: , Rfl:   •  Multiple Vitamins-Minerals (AQUADEKS PO), Take  by mouth., Disp: , Rfl:   •  pancrelipase, Lip-Prot-Amyl, (Creon) 48593-83775 units capsule delayed-release particles capsule, TAKE 4 TO 5 CAPSULES BY MOUTH THREE TIMES A DAY WITH MEALS AND 2 TO 3 CAPSULES WITH SNACK, Disp: 720 capsule, Rfl: 3  •  Probiotic Product (PROBIOTIC DAILY PO), Take  by mouth., Disp: , Rfl:   •  riFAXIMin (XIFAXAN) 550 MG tablet, Take 550 mg by mouth As Needed., Disp: , Rfl:   •  sertraline (ZOLOFT) 25 MG tablet, Take 1 tablet by mouth Daily., Disp: 30 tablet, Rfl: 3  •  tretinoin (Retin-A) 0.05 % cream, Apply  topically to the appropriate area as directed Every Night.,  Disp: 20 g, Rfl: 2  •  Unable to find, Take 500 mg by mouth Daily. Bacopa, Disp: , Rfl:   •  Unable to find, Take 1 each by mouth Daily. Med Name:Ashwaghanda, Disp: , Rfl:   •  Unable to find, 1 each 1 (One) Time. Med Name: Triphala, Disp: , Rfl:   •  Unable to find, Take 1 each by mouth Daily. Med Name: Calcium, Disp: , Rfl:   •  vitamin C (ASCORBIC ACID) 500 MG tablet, Take 500 mg by mouth Daily., Disp: , Rfl:   •  vitamin D (ERGOCALCIFEROL) 33268 UNITS capsule capsule, TAKE ONE CAPSULE BY MOUTH EVERY 7 DAYS, Disp: 4 capsule, Rfl: 0  •  zolpidem (AMBIEN) 5 MG tablet, TAKE ONE TABLET BY MOUTH ONCE NIGHTLY AS NEEDED FOR SLEEP, Disp: 30 tablet, Rfl: 0    Return in about 3 months (around 1/17/2023), or if symptoms worsen or fail to improve, for Medicare Wellness, Recheck.

## 2022-10-17 NOTE — PROGRESS NOTES
Palliative Clinic Note      Name: Renée Laurent  Age: 61 y.o.  Sex: female  : 1960  MRN: [id number]  Date of Service: 10/17/22  Mode of visit: Video   Location of patient: Home    The patient has chosen to receive care through a telehealth visit.   Does the patient consent to using video/audio connection for their medical care today? Yes   No technical issues occurred during this visit.     Subjective:    Chief Complaint: Itching     History of Present Illness: Renée Laurent is a 61 y.o. female with past medical history significant for gastroparesis, chronic pancreatitis, post pancreatectomy diabetes, status post splenectomy, hyperlipidemia, asthma, GERD, and mood disorder who presents via videochat today as a follow up for pain and symptom management.     Treatment summary: History of chronic pancreatitis status post pancreatectomy in  with autotransplantation of the islets.  Also suffers from gastroparesis with placement of gastric pacemaker in 2018.  Unfortunately, the abdominal pain, bloating, nausea/vomiting and weight loss continued despite intervention.  Symptoms limit activity outside of home and have caused her to miss many medical appointments in the past. Concurrent history of chronic cervical neck pain status post discectomy, fusion, and rhizotomy.  Status post implantation of a spinal stimulator by Dr. Kumar in 2020 with 60-70% improvement in pain. The patient decided to defer temporary feeding tube for now.      Pain: Discussed with patient that [facility] palliative care prescribes opioid therapy for pain related to cancer. The patient was referred to pain specialist Dr. ESTEFANIA Knight, however decided this was not the route she wanted to go. Their clinic policy stated the patient would be dismissed for > 3 missed appointments and she has a hard time making appointments due to her complex medical history. Additionally, the patient was referred to home palliative through  "[facility] but unfortunately was not able to have an initial visit due to miscommunication. The patient decided she would attempt to taper off of the Belbuca. The medication was decreased from 450 mcg film daily to 300 mcg film daily in 8/2022. After stabilizing on this dose for 2 months, the patient notes increased pain  But no obvious withdrawal symptoms. Today, the patient states she would like to continue the taper down to 225 mcg film daily.     Other symptoms: The patient recently returned from a trip to [place] to visit her grandchildren. She was exposed to bed bugs and/or fleas on the trip and is concerned she has brought them home with her. She has an  coming to the house tomorrow to take care of the problem. In the meantime, the patient admits to iuncreased itching and anxiety. The patient reports occasional GI flare ups related to the gastroparesis. Patient is taking the Ambien for sleep.     Pyschosocial: The patient lives alone. GI flareups have a significant impact on her quality of life.  She reports feeling overwhelmed and tired of being \"unreliable.\"  Patient has children and grandchildren in [facility] but does not get to see them often due to her illness.  Overall mood is stable with some intermittent anxiety.  Patient managing this with Zoloft 25 mg daily and non-pharmacotherapy. Patient follows with a psychiatrist.      Goals:  Hoping to improve quality of life with management of symptoms.     The following portions of the patient's history were reviewed and updated as appropriate: allergies, current medications, past family history, past medical history, past social history, past surgical history and problem list.    ORT-R: Low risk  Decisional capacity: Full  ECOG: (1) Restricted in physically strenuous activity, ambulatory and able to do work of light nature   Palliative Performance Scale Score: 80%     Objective:    Constitutional: Awake, " alert, sitting up   Eyes: PERRLA, EOMS intact  HENT: NCAT, face symmetric  Neck: Supple, trachea midline  Respiratory: Nonlabored respirations  Musculoskeletal: Moves all extremities   Psychiatric: Appropriate affect, cooperative  Neurologic: Oriented x 3, Cranial Nerves grossly intact to confrontation, speech clear    Medication Counts: Collected over the phone. See bottom of note for details. No misuse or overuse evident.   I have reviewed the patient's [place] PDMP. YADI Req #[id number].   UDS (Y): Last 9/24/20. Patient is aware this needs to be collected.     Assessment & Plan:    1. Itching  - Recommend OTC Benadryl as needed for itching.  coming to the house tomorrow to assess for bed bugs/fleas.     2. Chronic abdominal pain  - Refill for Belbuca 300 mcg films sent to the pharmacy on 10/14/22. Will decrease dose to 225 mcg film daily (150+75 mcg films) next month and continue the taper. Patient understands that she will need to be referred to pain specialist or home palliative program if she decides to continue chronic opioid therapy.     3. Insomnia, unspecified type  - Continue Ambien 5 mg nightly. Refill sent on 10/14/22.     Code status: Full code  Medical interventions: Full  Advanced directives: Defer    No follow-ups on file.    I spent 50 minutes caring for Renée Laurent on this date of service. This time includes time spent by me in the following activities: preparing for the visit, reviewing tests, obtaining and/or reviewing a separately obtained history, performing a medically appropriate examination and/or evaluation , counseling and educating the patient/family/caregiver, ordering medications, tests, or procedures, documenting information in the medical record, independently interpreting results and communicating that information with the patient/family/caregiver, and care coordination    Riddhi Maddox PA-C  10/17/2022    Medication Date Filled # Filled Count Used # Days  KASSIE    Belbuca 300 9/11/22 30 0   1   Ambien 5 9/10/22 30 0   1

## 2022-11-10 DIAGNOSIS — F41.9 ANXIETY: ICD-10-CM

## 2022-11-10 DIAGNOSIS — G47.00 INSOMNIA, UNSPECIFIED TYPE: ICD-10-CM

## 2022-11-11 RX ORDER — BUSPIRONE HYDROCHLORIDE 5 MG/1
TABLET ORAL
Qty: 180 TABLET | Refills: 1 | Status: SHIPPED | OUTPATIENT
Start: 2022-11-11

## 2022-11-11 RX ORDER — ZOLPIDEM TARTRATE 5 MG/1
TABLET ORAL
Qty: 30 TABLET | Refills: 0 | Status: SHIPPED | OUTPATIENT
Start: 2022-11-16 | End: 2022-12-14 | Stop reason: DRUGHIGH

## 2022-11-11 NOTE — TELEPHONE ENCOUNTER
YADI #: [id number]    Medication requested: Ambien 5mg    Last fill date: 10/17/2022    Last appointment: 10/17/2022    Next appointment: 12/12/2022

## 2022-11-15 RX ORDER — INSULIN LISPRO 100 [IU]/ML
INJECTION, SOLUTION INTRAVENOUS; SUBCUTANEOUS
Qty: 40 ML | Refills: 2 | Status: SHIPPED | OUTPATIENT
Start: 2022-11-15

## 2022-12-12 ENCOUNTER — TELEPHONE (OUTPATIENT)
Dept: PALLIATIVE CARE | Facility: CLINIC | Age: 62
End: 2022-12-12

## 2022-12-12 NOTE — TELEPHONE ENCOUNTER
Caller: Renée Laurent    Relationship to patient: Self    Best call back number:  [phone]    Type of visit: MY CHART VIDEO     Requested date: NEXT AVAILABLE     If rescheduling, when is the original appointment: 12/12/22     Additional notes:PATIENT LOOKING TO RESCHEDULE MY CHART VIDEO APPOINTMENT. REQUESTING A CALL BACK TO DO SO.

## 2022-12-14 ENCOUNTER — TELEMEDICINE (OUTPATIENT)
Dept: PALLIATIVE CARE | Facility: CLINIC | Age: 62
End: 2022-12-14

## 2022-12-14 VITALS — WEIGHT: 136 LBS | BODY MASS INDEX: 23.22 KG/M2 | HEIGHT: 64 IN

## 2022-12-14 DIAGNOSIS — G89.29 CHRONIC ABDOMINAL PAIN: Primary | ICD-10-CM

## 2022-12-14 DIAGNOSIS — G47.00 INSOMNIA, UNSPECIFIED TYPE: ICD-10-CM

## 2022-12-14 DIAGNOSIS — R10.9 CHRONIC ABDOMINAL PAIN: Primary | ICD-10-CM

## 2022-12-14 DIAGNOSIS — R10.30 LOWER ABDOMINAL PAIN: Primary | ICD-10-CM

## 2022-12-14 PROCEDURE — 99214 OFFICE O/P EST MOD 30 MIN: CPT | Performed by: PHYSICIAN ASSISTANT

## 2022-12-14 RX ORDER — BUPRENORPHINE HYDROCHLORIDE 75 UG/1
75 FILM, SOLUBLE BUCCAL DAILY
Qty: 30 EACH | Refills: 0 | Status: SHIPPED | OUTPATIENT
Start: 2022-12-14 | End: 2023-01-13

## 2022-12-14 RX ORDER — ZOLPIDEM TARTRATE 10 MG/1
10 TABLET ORAL NIGHTLY PRN
Qty: 30 TABLET | Refills: 0 | Status: SHIPPED | OUTPATIENT
Start: 2022-12-14 | End: 2023-01-11

## 2022-12-14 NOTE — PROGRESS NOTES
Palliative Clinic Note      Name: Renée Laurent  Age: 62 y.o.  Sex: female  : 1960  MRN: [id number]  Date of Service: 2022   Mode of visit: video-conference  Location of patient: Home  Location of provider: [facility]    Subjective:    Chief Complaint: Chronic pain    History of Present Illness: Renée Laurent is a 62 y.o. female with past medical history significant for gastroparesis, chronic pancreatitis, post pancreatectomy diabetes, status post splenectomy, hyperlipidemia, asthma, GERD, and mood disorder who presents via video-conference today as a follow up for pain and symptom management.     Treatment summary: History of chronic pancreatitis status post pancreatectomy in  with autotransplantation of the islets.  Also suffers from gastroparesis with placement of gastric pacemaker in 2018.  Unfortunately, the abdominal pain, bloating, nausea/vomiting and weight loss continued despite intervention.  Symptoms limit activity outside of home and have caused her to miss many medical appointments in the past. Concurrent history of chronic cervical neck pain status post discectomy, fusion, and rhizotomy.  Status post implantation of a spinal stimulator by Dr. Kumar in  with 60-70% improvement in pain. The patient decided to defer temporary feeding tube.      Pain: Discussed with patient that [facility] palliative care prescribes opioid therapy for pain related to cancer. The patient was referred to pain specialist Dr. ESTEFANIA Knight, however decided this was not the route she wanted to go. Their clinic policy stated the patient would be dismissed for > 3 missed appointments and she has a hard time making appointments due to her complex medical history. Additionally, the patient was referred to home palliative through [facility] but unfortunately was not able to have an initial visit due to miscommunication. In addition to the high cost of the medication, the  "patient decided try and taper off of the Belbuca.  Over several months the medication was decreased from 450 mcg film daily to 150 mcg film daily. Today, the patient would like to continue the taper down to 75 mcg film daily. She is looking into alternatives including Ketamine infusions.       Other symptoms: The patient reports occasional GI flare ups related to the gastroparesis. Patient is taking the Ambien for sleep. She requests an increase in dose to off set her insomnia due to increase pain.      Pyschosocial: The patient lives alone. GI flareups have a significant impact on her quality of life.  She reports feeling overwhelmed and tired of being \"unreliable.\"  Patient has children and grandchildren in [facility] but does not get to see them often due to her illness.  Overall mood is stable with some intermittent anxiety.  Patient managing this with Zoloft 25 mg daily and non-pharmacotherapy. Patient follows with a psychiatrist.      Goals:  Hoping to improve quality of life with management of symptoms.     The following portions of the patient's history were reviewed and updated as appropriate: allergies, current medications, past family history, past medical history, past social history, past surgical history and problem list.    ORT-R: Low risk  Decisional capacity: Full  ECOG: (1) Restricted in physically strenuous activity, ambulatory and able to do work of light nature   Palliative Performance Scale Score: 60%     Objective:    Constitutional: Awake, alert, sitting up   Eyes: PERRLA, EOMS intact  HENT: NCAT, face symmetric  Neck: Supple, trachea midline  Respiratory: Nonlabored respirations  Musculoskeletal: Moves all extremities   Psychiatric: Appropriate affect, cooperative  Neurologic: Oriented x 3, Cranial Nerves grossly intact to confrontation, speech clear    Medication Counts: Collected over the phone. See bottom of note for details. No misuse or overuse evident.   I have reviewed " the patient's [place] PDMP. YADI Req #[id number].   UDS (Y): Last 9/24/20. Patient is aware this needs to be collected.     Assessment & Plan:    1. Chronic abdominal pain  -Patient would like to continue the taper of opioid therapy.  Plan to decrease Belbuca to 75 mcg films per day.  Sent to the pharmacy today.  Recommend patient consider home palliative and/or pain clinic if she is unable to continue taper.    2. Insomnia, unspecified type  - zolpidem (AMBIEN) 10 MG tablet; Take 1 tablet by mouth At Night As Needed for Sleep for up to 30 days.  Discussed risks involved with this medication and voiced my concerns about patient's hypoglycemic episodes during the night.  Patient would still like to increase the medication despite increased risks.    Code status: Full code  Medical interventions: Full  Advanced directives: Defer    Return in about 1 month (around 1/14/2023) for Video visit.    The patient has chosen to receive care through a telehealth visit.   Does the patient consent to using a video visit for their medical care today? Yes   The visit included audio and video interaction. No technical issues occurred during this visit.  I spent 50 minutes caring for Renée Laurent on this date of service. This time includes time spent by me in the following activities: preparing for the visit, reviewing tests, obtaining and/or reviewing a separately obtained history, performing a medically appropriate examination and/or evaluation, counseling and educating the patient/family/caregiver, ordering medications, tests, or procedures, documenting information in the medical record, independently interpreting results and communicating that information with the patient/family/caregiver, and care coordination.    Riddhi Maddox PA-C  12/14/2022    Medication Date Filled # Filled Count Used # Days  KASSIE   Belbuca 150   11/17/2020  30  4  26  27  1   Ambien 5  11/14/2022  30 2 28 27 1

## 2022-12-14 NOTE — TELEPHONE ENCOUNTER
I have reviewed patient's YADI report prior to prescribing Schedule II, III, and IV medications. Request # [id number]. Next refill for Belbuca 75 mcg film daily #30 was sent to the pharmacy. The patient is scheduled to follow-up in 1 month.

## 2022-12-16 ENCOUNTER — HOSPITAL ENCOUNTER (EMERGENCY)
Facility: HOSPITAL | Age: 62
Discharge: LEFT BEFORE DISCHARGE | End: 2022-12-16
Attending: EMERGENCY MEDICINE | Admitting: EMERGENCY MEDICINE

## 2022-12-16 ENCOUNTER — APPOINTMENT (OUTPATIENT)
Dept: CT IMAGING | Facility: HOSPITAL | Age: 62
End: 2022-12-16

## 2022-12-16 VITALS
TEMPERATURE: 97.9 F | WEIGHT: 136 LBS | HEART RATE: 66 BPM | RESPIRATION RATE: 20 BRPM | BODY MASS INDEX: 23.22 KG/M2 | SYSTOLIC BLOOD PRESSURE: 126 MMHG | HEIGHT: 64 IN | DIASTOLIC BLOOD PRESSURE: 52 MMHG | OXYGEN SATURATION: 93 %

## 2022-12-16 DIAGNOSIS — K76.0 HEPATIC STEATOSIS: ICD-10-CM

## 2022-12-16 DIAGNOSIS — K31.84 GASTROPARESIS: ICD-10-CM

## 2022-12-16 DIAGNOSIS — R91.1 PULMONARY NODULE: Primary | ICD-10-CM

## 2022-12-16 DIAGNOSIS — R11.2 NAUSEA AND VOMITING, UNSPECIFIED VOMITING TYPE: ICD-10-CM

## 2022-12-16 DIAGNOSIS — D72.829 LEUKOCYTOSIS, UNSPECIFIED TYPE: ICD-10-CM

## 2022-12-16 DIAGNOSIS — R10.84 GENERALIZED ABDOMINAL PAIN: ICD-10-CM

## 2022-12-16 LAB
ALBUMIN SERPL-MCNC: 4.3 G/DL (ref 3.5–5.2)
ALBUMIN/GLOB SERPL: 1.3 G/DL
ALP SERPL-CCNC: 148 U/L (ref 39–117)
ALT SERPL W P-5'-P-CCNC: 14 U/L (ref 1–33)
ANION GAP SERPL CALCULATED.3IONS-SCNC: 17 MMOL/L (ref 5–15)
AST SERPL-CCNC: 29 U/L (ref 1–32)
BACTERIA UR QL AUTO: ABNORMAL /HPF
BASOPHILS # BLD AUTO: 0.04 10*3/MM3 (ref 0–0.2)
BASOPHILS NFR BLD AUTO: 0.2 % (ref 0–1.5)
BILIRUB SERPL-MCNC: 0.6 MG/DL (ref 0–1.2)
BILIRUB UR QL STRIP: NEGATIVE
BUN SERPL-MCNC: 10 MG/DL (ref 8–23)
BUN/CREAT SERPL: 13.2 (ref 7–25)
CALCIUM SPEC-SCNC: 9.4 MG/DL (ref 8.6–10.5)
CHLORIDE SERPL-SCNC: 103 MMOL/L (ref 98–107)
CLARITY UR: CLEAR
CO2 SERPL-SCNC: 17 MMOL/L (ref 22–29)
COLOR UR: YELLOW
CREAT SERPL-MCNC: 0.76 MG/DL (ref 0.57–1)
D-LACTATE SERPL-SCNC: 3.3 MMOL/L (ref 0.5–2)
DEPRECATED RDW RBC AUTO: 46.8 FL (ref 37–54)
EGFRCR SERPLBLD CKD-EPI 2021: 87.6 ML/MIN/1.73
EOSINOPHIL # BLD AUTO: 0 10*3/MM3 (ref 0–0.4)
EOSINOPHIL NFR BLD AUTO: 0 % (ref 0.3–6.2)
ERYTHROCYTE [DISTWIDTH] IN BLOOD BY AUTOMATED COUNT: 14.2 % (ref 12.3–15.4)
GLOBULIN UR ELPH-MCNC: 3.2 GM/DL
GLUCOSE SERPL-MCNC: 298 MG/DL (ref 65–99)
GLUCOSE UR STRIP-MCNC: ABNORMAL MG/DL
HCT VFR BLD AUTO: 40.3 % (ref 34–46.6)
HGB BLD-MCNC: 13.4 G/DL (ref 12–15.9)
HGB UR QL STRIP.AUTO: NEGATIVE
HOLD SPECIMEN: NORMAL
HYALINE CASTS UR QL AUTO: ABNORMAL /LPF
IMM GRANULOCYTES # BLD AUTO: 0.08 10*3/MM3 (ref 0–0.05)
IMM GRANULOCYTES NFR BLD AUTO: 0.4 % (ref 0–0.5)
KETONES UR QL STRIP: ABNORMAL
LEUKOCYTE ESTERASE UR QL STRIP.AUTO: ABNORMAL
LIPASE SERPL-CCNC: 6 U/L (ref 13–60)
LYMPHOCYTES # BLD AUTO: 2.12 10*3/MM3 (ref 0.7–3.1)
LYMPHOCYTES NFR BLD AUTO: 10.3 % (ref 19.6–45.3)
MCH RBC QN AUTO: 29.8 PG (ref 26.6–33)
MCHC RBC AUTO-ENTMCNC: 33.3 G/DL (ref 31.5–35.7)
MCV RBC AUTO: 89.6 FL (ref 79–97)
MONOCYTES # BLD AUTO: 1.05 10*3/MM3 (ref 0.1–0.9)
MONOCYTES NFR BLD AUTO: 5.1 % (ref 5–12)
NEUTROPHILS NFR BLD AUTO: 17.26 10*3/MM3 (ref 1.7–7)
NEUTROPHILS NFR BLD AUTO: 84 % (ref 42.7–76)
NITRITE UR QL STRIP: POSITIVE
NRBC BLD AUTO-RTO: 0 /100 WBC (ref 0–0.2)
PH UR STRIP.AUTO: 7.5 [PH] (ref 5–8)
PLAT MORPH BLD: NORMAL
PLATELET # BLD AUTO: 416 10*3/MM3 (ref 140–450)
PMV BLD AUTO: 11.6 FL (ref 6–12)
POTASSIUM SERPL-SCNC: 4.4 MMOL/L (ref 3.5–5.2)
PROT SERPL-MCNC: 7.5 G/DL (ref 6–8.5)
PROT UR QL STRIP: NEGATIVE
RBC # BLD AUTO: 4.5 10*6/MM3 (ref 3.77–5.28)
RBC # UR STRIP: ABNORMAL /HPF
RBC MORPH BLD: NORMAL
REF LAB TEST METHOD: ABNORMAL
SODIUM SERPL-SCNC: 137 MMOL/L (ref 136–145)
SP GR UR STRIP: 1.02 (ref 1–1.03)
SQUAMOUS #/AREA URNS HPF: ABNORMAL /HPF
TROPONIN T SERPL-MCNC: <0.01 NG/ML (ref 0–0.03)
UROBILINOGEN UR QL STRIP: ABNORMAL
WBC # UR STRIP: ABNORMAL /HPF
WBC MORPH BLD: NORMAL
WBC NRBC COR # BLD: 20.55 10*3/MM3 (ref 3.4–10.8)
WHOLE BLOOD HOLD COAG: NORMAL
WHOLE BLOOD HOLD SPECIMEN: NORMAL

## 2022-12-16 PROCEDURE — 84484 ASSAY OF TROPONIN QUANT: CPT | Performed by: EMERGENCY MEDICINE

## 2022-12-16 PROCEDURE — 74177 CT ABD & PELVIS W/CONTRAST: CPT

## 2022-12-16 PROCEDURE — 85025 COMPLETE CBC W/AUTO DIFF WBC: CPT | Performed by: EMERGENCY MEDICINE

## 2022-12-16 PROCEDURE — 96374 THER/PROPH/DIAG INJ IV PUSH: CPT

## 2022-12-16 PROCEDURE — 96375 TX/PRO/DX INJ NEW DRUG ADDON: CPT

## 2022-12-16 PROCEDURE — 99284 EMERGENCY DEPT VISIT MOD MDM: CPT

## 2022-12-16 PROCEDURE — 81001 URINALYSIS AUTO W/SCOPE: CPT | Performed by: EMERGENCY MEDICINE

## 2022-12-16 PROCEDURE — 25010000002 ONDANSETRON PER 1 MG: Performed by: EMERGENCY MEDICINE

## 2022-12-16 PROCEDURE — 25010000002 MORPHINE PER 10 MG: Performed by: EMERGENCY MEDICINE

## 2022-12-16 PROCEDURE — 36415 COLL VENOUS BLD VENIPUNCTURE: CPT

## 2022-12-16 PROCEDURE — 83690 ASSAY OF LIPASE: CPT | Performed by: EMERGENCY MEDICINE

## 2022-12-16 PROCEDURE — 83605 ASSAY OF LACTIC ACID: CPT | Performed by: EMERGENCY MEDICINE

## 2022-12-16 PROCEDURE — 25010000002 IOPAMIDOL 61 % SOLUTION: Performed by: EMERGENCY MEDICINE

## 2022-12-16 PROCEDURE — 85007 BL SMEAR W/DIFF WBC COUNT: CPT | Performed by: EMERGENCY MEDICINE

## 2022-12-16 PROCEDURE — 80053 COMPREHEN METABOLIC PANEL: CPT | Performed by: EMERGENCY MEDICINE

## 2022-12-16 PROCEDURE — 93005 ELECTROCARDIOGRAM TRACING: CPT | Performed by: EMERGENCY MEDICINE

## 2022-12-16 RX ORDER — SODIUM CHLORIDE 9 MG/ML
10 INJECTION INTRAVENOUS AS NEEDED
Status: DISCONTINUED | OUTPATIENT
Start: 2022-12-16 | End: 2022-12-17 | Stop reason: HOSPADM

## 2022-12-16 RX ORDER — MORPHINE SULFATE 4 MG/ML
4 INJECTION, SOLUTION INTRAMUSCULAR; INTRAVENOUS ONCE
Status: COMPLETED | OUTPATIENT
Start: 2022-12-16 | End: 2022-12-16

## 2022-12-16 RX ORDER — ONDANSETRON 2 MG/ML
8 INJECTION INTRAMUSCULAR; INTRAVENOUS ONCE
Status: COMPLETED | OUTPATIENT
Start: 2022-12-16 | End: 2022-12-16

## 2022-12-16 RX ADMIN — ONDANSETRON 8 MG: 2 INJECTION INTRAMUSCULAR; INTRAVENOUS at 16:34

## 2022-12-16 RX ADMIN — SODIUM CHLORIDE, POTASSIUM CHLORIDE, SODIUM LACTATE AND CALCIUM CHLORIDE 1000 ML: 600; 310; 30; 20 INJECTION, SOLUTION INTRAVENOUS at 16:34

## 2022-12-16 RX ADMIN — MORPHINE SULFATE 4 MG: 4 INJECTION, SOLUTION INTRAMUSCULAR; INTRAVENOUS at 16:34

## 2022-12-16 RX ADMIN — IOPAMIDOL 90 ML: 612 INJECTION, SOLUTION INTRAVENOUS at 17:56

## 2022-12-17 LAB
QT INTERVAL: 424 MS
QTC INTERVAL: 473 MS

## 2022-12-17 NOTE — ED PROVIDER NOTES
Subjective   History of Present Illness  Pt is a 62 year old female with a history of DM, gastroparesis, multiple abdominal surgeries, splenectomy, pancreatectomy, cervical discectomy, and chronic pain.  She presents today with diffuse abdominal pain for the past one day.  The pain is rated as severe.  She has experienced 10 episodes of nausea and vomiting today.  She is followed by [facility], where she had her pancreatectomy performed in the distant past. Today pain is similar to her gastroparesis pain and pancreatitis pain she has experienced in the past.  Denies vomiting.  Denies blood in stool.  Pt admits to significant generalized weakness and multiple near syncopal episodes, but denies LOC.    Denies fever, chest pain, soa, or other acute complaints.    Abdominal Pain  Pain location:  Generalized  Pain quality: sharp    Pain radiates to:  Does not radiate  Onset quality:  Gradual  Duration:  1 day  Timing:  Constant  Progression:  Waxing and waning  Chronicity:  New  Relieved by:  Nothing  Worsened by:  Nothing  Ineffective treatments:  None tried  Associated symptoms: fatigue and nausea    Associated symptoms: no chest pain, no fever and no shortness of breath        Review of Systems   Constitutional: Positive for fatigue. Negative for fever.   Respiratory: Negative for shortness of breath.    Cardiovascular: Negative for chest pain.   Gastrointestinal: Positive for abdominal pain and nausea.   All other systems reviewed and are negative.      Past Medical History:   Diagnosis Date   • Abdominal pain    • Absence of pancreas, acquired    • Allergic    • Anemia    • Anxiety    • Asthma    • Basal cell carcinoma    • Bile reflux gastritis    • Chronic illness    • Contact dermatitis     due to plants   • Depression    • Diabetes mellitus (HCC)    • Diabetes mellitus type I (HCC) 2011    Type 3c - surgically induced   • Eating disorder     Resolved   • Encounter for dental examination 07/2014   •  Gastroparesis    • GERD (gastroesophageal reflux disease)    • History of medical problems 12/1/2011    Gastroparesis   • History of MRSA infection     [facility] 2009 abdominal incisional infection   • Hyperlipidemia    • Hypoglycemia 2011   • Incisional hernia    • Insomnia    • Iron deficiency    • Kidney stone     Left kidney   • Myeloid leukemia (HCC)    • Osteopenia    • Osteoporosis 2015    Osteopenia   • Pancreatitis    • Pneumonia 1/1/2017   • PONV (postoperative nausea and vomiting)    • Spinal headache    • Vitamin D deficiency    • Wears glasses        Allergies   Allergen Reactions   • Aspirin Hives   • Phenergan [Promethazine Hcl]      Anxiety/relessness   • Reglan [Metoclopramide]      Involuntary movement       Past Surgical History:   Procedure Laterality Date   • ABDOMINAL SURGERY      x 10   • CERVICAL DISCECTOMY ANTERIOR  1997    ACDF w/ fusion -Dr. Jerald Lopez   • CHOLECYSTECTOMY     • CHOLECYSTECTOMY     • COLONOSCOPY     • COSMETIC SURGERY      Rhinoplasty   • GASTRIC STIMULATOR IMPLANT SURGERY  12/17/2018    [facility]   • HERNIA MESH REMOVAL  2014    abdominal hernia with mesh    • HYSTERECTOMY     • OTHER SURGICAL HISTORY      duodeum removed   • OTHER SURGICAL HISTORY      jejunem removed   • PANCREATECTOMY     • SMALL INTESTINE SURGERY  12/1/2011    Tp-ait   • SPINAL CORD STIMULATOR IMPLANT N/A 5/10/2021    Procedure: SPINAL CORD STIMULATOR INSERTION;  Surgeon: Kedar Estevez MD;  [facility];  Service: Neurosurgery;  Laterality: N/A;   • TOTAL ABDOMINAL HYSTERECTOMY WITH SALPINGO OOPHORECTOMY     • TUBAL ABDOMINAL LIGATION     • UPPER GASTROINTESTINAL ENDOSCOPY  10/25/2017    Dr Mehta, gastritis, eosinphils in esophagus       Family History   Problem Relation Age of Onset   • Osteoporosis Mother    • Cancer Mother         CLL   • Hashimoto's thyroiditis Mother    • Arthritis Mother    • Depression Mother    • Thyroid disease Mother         Hashimoto’s   • Lung  cancer Father    • Alcohol abuse Father    • Cancer Father         Lung   • Liver disease Father    • Asthma Sister        Social History     Socioeconomic History   • Marital status: Single   Tobacco Use   • Smoking status: Never   • Smokeless tobacco: Never   Vaping Use   • Vaping Use: Never used   Substance and Sexual Activity   • Alcohol use: No   • Drug use: No   • Sexual activity: Not Currently     Partners: Male     Birth control/protection: Post-menopausal           Objective   Physical Exam  Vitals and nursing note reviewed.   Constitutional:       General: She is in acute distress.      Appearance: She is ill-appearing.   HENT:      Head: Normocephalic and atraumatic.   Eyes:      Extraocular Movements: Extraocular movements intact.      Pupils: Pupils are equal, round, and reactive to light.   Cardiovascular:      Rate and Rhythm: Normal rate and regular rhythm.      Heart sounds: Normal heart sounds.   Pulmonary:      Effort: Pulmonary effort is normal. No respiratory distress.      Breath sounds: Normal breath sounds. No wheezing.   Abdominal:      General: Abdomen is flat.      Palpations: Abdomen is soft.      Tenderness: There is generalized abdominal tenderness.   Skin:     General: Skin is warm and dry.      Coloration: Skin is pale.   Neurological:      General: No focal deficit present.      Mental Status: She is alert and oriented to person, place, and time.   Psychiatric:         Mood and Affect: Mood is anxious.         Procedures           ED Course          CT Abdomen Pelvis With Contrast   Final Result   No evidence of acute intra-abdominal abnormality.       Extensive postoperative changes in the abdomen. Of note there is   abnormal positioning of the stomach with the distal stomach being in the   left upper quadrant though this may be due to postsurgical changes and   creation of the gastrojejunostomy.       17 mm nodular opacity in the right lower lobe. Recommend short interval    follow-up with dedicated CT chest.       Hepatic steatosis.       Fluid in the distal esophagus which places the patient at risk for   aspiration.       This report was finalized on 12/16/2022 6:35 PM by Kunal Leonard.            Vitals:    12/16/22 1723 12/16/22 1728 12/16/22 1733 12/16/22 1800   BP: 126/83 146/99  126/52   BP Location:       Patient Position:       Pulse:       Resp:       Temp:       TempSrc:       SpO2: 97%  97% 93%   Weight:       Height:         Medications   ondansetron (ZOFRAN) injection 8 mg (8 mg Intravenous Given 12/16/22 1634)   Morphine sulfate (PF) injection 4 mg (4 mg Intravenous Given 12/16/22 1634)   lactated ringers bolus 1,000 mL (0 mL Intravenous Stopped 12/16/22 1916)   iopamidol (ISOVUE-300) 61 % injection 100 mL (90 mL Intravenous Given 12/16/22 1756)     ECG/EMG Results (last 24 hours)     ** No results found for the last 24 hours. **        ECG 12 Lead Other; abdominal pain nausea   Final Result   Test Reason : Other~   Blood Pressure :   */*   mmHG   Vent. Rate :  75 BPM     Atrial Rate :  73 BPM      P-R Int :   * ms          QRS Dur :  72 ms       QT Int : 424 ms       P-R-T Axes :   *  73  85 degrees      QTc Int : 473 ms      ** Suspect unspecified pacemaker failure   Undetermined rhythm   ST & T wave abnormality, consider lateral ischemia   Abnormal ECG   When compared with ECG of 07-MAY-2021 10:44,   Current undetermined rhythm precludes rhythm comparison, needs review   Nonspecific T wave abnormality now evident in Lateral leads   QT has lengthened   Confirmed by MD ISHAN, SIVA ([id number]) on 12/17/2022 12:17:05 PM      Referred By: EDMD           Confirmed By: SIVA OLMSTEAD MD                Latest Reference Range & Units 12/16/22 16:35 12/16/22 17:23   Troponin T 0.000 - 0.030 ng/mL <0.010    Glucose 65 - 99 mg/dL 298 (H)    Sodium 136 - 145 mmol/L 137    Potassium 3.5 - 5.2 mmol/L 4.4    CO2 22.0 - 29.0 mmol/L 17.0 (L)    Chloride 98 - 107 mmol/L 103    Anion Gap  5.0 - 15.0 mmol/L 17.0 (H)    Creatinine 0.57 - 1.00 mg/dL 0.76    BUN 8 - 23 mg/dL 10    BUN/Creatinine Ratio 7.0 - 25.0  13.2    Calcium 8.6 - 10.5 mg/dL 9.4    eGFR >60.0 mL/min/1.73 87.6    Alkaline Phosphatase 39 - 117 U/L 148 (H)    Total Protein 6.0 - 8.5 g/dL 7.5    ALT (SGPT) 1 - 33 U/L 14    AST (SGOT) 1 - 32 U/L 29    Total Bilirubin 0.0 - 1.2 mg/dL 0.6    Albumin 3.50 - 5.20 g/dL 4.30    Globulin gm/dL 3.2    A/G Ratio g/dL 1.3    Lactate 0.5 - 2.0 mmol/L 3.3 (C)    Lipase 13 - 60 U/L 6 (L)    WBC 3.40 - 10.80 10*3/mm3 20.55 (H)    RBC 3.77 - 5.28 10*6/mm3 4.50    Hemoglobin 12.0 - 15.9 g/dL 13.4    Hematocrit 34.0 - 46.6 % 40.3    RDW 12.3 - 15.4 % 14.2    MCV 79.0 - 97.0 fL 89.6    MCH 26.6 - 33.0 pg 29.8    MCHC 31.5 - 35.7 g/dL 33.3    MPV 6.0 - 12.0 fL 11.6    Platelets 140 - 450 10*3/mm3 416    RDW-SD 37.0 - 54.0 fl 46.8    Neutrophil Rel % 42.7 - 76.0 % 84.0 (H)    Lymphocyte Rel % 19.6 - 45.3 % 10.3 (L)    Monocyte Rel % 5.0 - 12.0 % 5.1    Eosinophil Rel % 0.3 - 6.2 % 0.0 (L)    Basophil Rel % 0.0 - 1.5 % 0.2    Immature Granulocyte Rel % 0.0 - 0.5 % 0.4    Neutrophils Absolute 1.70 - 7.00 10*3/mm3 17.26 (H)    Lymphocytes Absolute 0.70 - 3.10 10*3/mm3 2.12    Monocytes Absolute 0.10 - 0.90 10*3/mm3 1.05 (H)    Eosinophils Absolute 0.00 - 0.40 10*3/mm3 0.00    Basophils Absolute 0.00 - 0.20 10*3/mm3 0.04    Immature Grans, Absolute 0.00 - 0.05 10*3/mm3 0.08 (H)    WBC Morphology Normal  Normal    RBC morphology Normal  Normal    Platelet Morphology Normal  Normal    nRBC 0.0 - 0.2 /100 WBC 0.0    Color, UA Yellow, Straw   Yellow   Appearance, UA Clear   Clear   Specific Gravity, UA 1.001 - 1.030   1.019   pH, UA 5.0 - 8.0   7.5   Glucose Negative   >=1000 mg/dL (3+) !   Ketones, UA Negative   40 mg/dL (2+) !   Blood, UA Negative   Negative   Nitrite, UA Negative   Positive !   Leukocytes, UA Negative   Trace !   Protein, UA Negative   Negative   Bilirubin, UA Negative   Negative   Urobilinogen,  UA 0.2 - 1.0 E.U./dL   0.2 E.U./dL   RBC, UA None Seen, 0-2 /HPF  0-2   WBC, UA None Seen, 0-2 /HPF  3-5 !   Bacteria, UA None Seen, Trace /HPF  4+ !   Squamous Epithelial Cells, UA None Seen, 0-2 /HPF  0-2   Hyaline Casts, UA 0 - 6 /LPF  0-6   Methodology:   Automated Microscopy   (C): Data is critically high  (H): Data is abnormally high  (L): Data is abnormally low  !: Data is abnormal                                MDM    Pt was not in her room. Per nursing staff pt eloped without warning, prior to completion of workup.  She did not offer a chance for me to discuss the risks or leaving prematurely, prior to completion of workup and treatment.    Final diagnoses:   Pulmonary nodule   Hepatic steatosis   Generalized abdominal pain   Gastroparesis   Nausea and vomiting, unspecified vomiting type   Leukocytosis, unspecified type       ED Disposition  ED Disposition     ED Disposition   AMA    Condition   --    Comment   --                Ji Donohue DO  12/19/22 0037

## 2022-12-19 ENCOUNTER — TELEPHONE (OUTPATIENT)
Dept: INTERNAL MEDICINE | Facility: CLINIC | Age: 62
End: 2022-12-19

## 2022-12-19 DIAGNOSIS — G89.29 CHRONIC ABDOMINAL PAIN: Primary | ICD-10-CM

## 2022-12-19 DIAGNOSIS — R10.9 CHRONIC ABDOMINAL PAIN: Primary | ICD-10-CM

## 2022-12-19 NOTE — TELEPHONE ENCOUNTER
Caller: Jani Laurent    Relationship: Self    Best call back number: [phone]    What is the best time to reach you: ANY TIME    Who are you requesting to speak with (clinical staff, provider,  specific staff member): CLINICAL STAFF    Do you know the name of the person who called: JANI    What was the call regarding: PATIENT WOULD LIKE A CALL BACK TO DISCUSS HER RECENT VISIT TO [facility] EMERGENCY ROOM ON Friday 12/16/22 AND THE TESTS THAT WERE DONE THAT DAY.     Do you require a callback: YES

## 2022-12-27 ENCOUNTER — TELEPHONE (OUTPATIENT)
Dept: INTERNAL MEDICINE | Facility: CLINIC | Age: 62
End: 2022-12-27

## 2022-12-27 ENCOUNTER — TELEMEDICINE (OUTPATIENT)
Dept: INTERNAL MEDICINE | Facility: CLINIC | Age: 62
End: 2022-12-27

## 2022-12-27 DIAGNOSIS — R91.1 PULMONARY NODULE, RIGHT: Primary | ICD-10-CM

## 2022-12-27 DIAGNOSIS — R11.2 NAUSEA AND VOMITING, UNSPECIFIED VOMITING TYPE: ICD-10-CM

## 2022-12-27 DIAGNOSIS — K31.84 GASTROPARESIS: ICD-10-CM

## 2022-12-27 DIAGNOSIS — Z71.1 CONCERN ABOUT END OF LIFE: ICD-10-CM

## 2022-12-27 DIAGNOSIS — R82.90 ABNORMAL FINDING ON URINALYSIS: ICD-10-CM

## 2022-12-27 PROCEDURE — 99214 OFFICE O/P EST MOD 30 MIN: CPT | Performed by: FAMILY MEDICINE

## 2022-12-27 RX ORDER — ONDANSETRON 4 MG/1
8 TABLET, FILM COATED ORAL EVERY 8 HOURS PRN
Qty: 60 TABLET | Refills: 3 | Status: SHIPPED | OUTPATIENT
Start: 2022-12-27

## 2022-12-27 NOTE — TELEPHONE ENCOUNTER
[facility] has been notified that order has been placed.  They will call to have this scheduled

## 2022-12-27 NOTE — PROGRESS NOTES
Subjective   Renée Laurent is a 62 y.o. female.     Chief Complaint   Patient presents with   • Results     From ER visit     You have chosen to receive care through a telehealth visit.  Do you consent to use a video/audio connection for your medical care today? Yes    This was an audio and video enabled telemedicine encounter.    Pt is home in [facility] and I am in my office in [facility]  Visit Vitals  LMP  (LMP Unknown) Comment: scheduled for mammo         History of Present Illness   Pt went to ER because of pain and vomiting on 12/16/22.   Pt left before results are back.   Pt has appt in January with Endocrine.   Pt had a nodule in her lung on CT.   Pt had abnormal urine on ER lab, but is not having UTI symptoms.  Pt is in palliative care. Pt is more pain.  The office palliative care will be switched to home based palliative care.  Pt is using CBD oil for the pain.     Pt has a form that if she had dementia, she does not want treatment.  Pt is concerned because her half brother had Picks disease and her mother had dementia. Pt is worried that she will develop dementia.     Pt denies depression, and is worried about her end of life.   The following portions of the patient's history were reviewed and updated as appropriate: allergies, current medications, past family history, past medical history, past social history, past surgical history and problem list.    Past Medical History:   Diagnosis Date   • Abdominal pain    • Absence of pancreas, acquired    • Allergic    • Anemia    • Anxiety    • Asthma    • Basal cell carcinoma    • Bile reflux gastritis    • Chronic illness    • Contact dermatitis     due to plants   • Depression    • Diabetes mellitus (HCC)    • Diabetes mellitus type I (HCC) 2011    Type 3c - surgically induced   • Eating disorder     Resolved   • Encounter for dental examination 07/2014   • Gastroparesis    • GERD (gastroesophageal reflux disease)    • History of medical  problems 12/1/2011    Gastroparesis   • History of MRSA infection     [facility] 2009 abdominal incisional infection   • Hyperlipidemia    • Hypoglycemia 2011   • Incisional hernia    • Insomnia    • Iron deficiency    • Kidney stone     Left kidney   • Myeloid leukemia (HCC)    • Osteopenia    • Osteoporosis 2015    Osteopenia   • Pancreatitis    • Pneumonia 1/1/2017   • PONV (postoperative nausea and vomiting)    • Spinal headache    • Vitamin D deficiency    • Wears glasses       Past Surgical History:   Procedure Laterality Date   • ABDOMINAL SURGERY      x 10   • CERVICAL DISCECTOMY ANTERIOR  1997    ACDF w/ fusion -Dr. Jerald Lopez   • CHOLECYSTECTOMY     • CHOLECYSTECTOMY     • COLONOSCOPY     • COSMETIC SURGERY      Rhinoplasty   • GASTRIC STIMULATOR IMPLANT SURGERY  12/17/2018    [facility]   • HERNIA MESH REMOVAL  2014    abdominal hernia with mesh    • HYSTERECTOMY     • OTHER SURGICAL HISTORY      duodeum removed   • OTHER SURGICAL HISTORY      jejunem removed   • PANCREATECTOMY     • SMALL INTESTINE SURGERY  12/1/2011    Tp-ait   • SPINAL CORD STIMULATOR IMPLANT N/A 5/10/2021    Procedure: SPINAL CORD STIMULATOR INSERTION;  Surgeon: Kedar Estevez MD;  Location: [facility];  Service: Neurosurgery;  Laterality: N/A;   • TOTAL ABDOMINAL HYSTERECTOMY WITH SALPINGO OOPHORECTOMY     • TUBAL ABDOMINAL LIGATION     • UPPER GASTROINTESTINAL ENDOSCOPY  10/25/2017    Dr Mehta, gastritis, eosinphils in esophagus      Family History   Problem Relation Age of Onset   • Osteoporosis Mother    • Cancer Mother         CLL   • Hashimoto's thyroiditis Mother    • Arthritis Mother    • Depression Mother    • Thyroid disease Mother         Hashimoto’s   • Lung cancer Father    • Alcohol abuse Father    • Cancer Father         Lung   • Liver disease Father    • Asthma Sister       Social History     Socioeconomic History   • Marital status: Single   Tobacco Use   • Smoking status: Never   • Smokeless tobacco:  Never   Vaping Use   • Vaping Use: Never used   Substance and Sexual Activity   • Alcohol use: No   • Drug use: No   • Sexual activity: Not Currently     Partners: Male     Birth control/protection: Post-menopausal      Allergies   Allergen Reactions   • Aspirin Hives   • Phenergan [Promethazine Hcl]      Anxiety/relessness   • Reglan [Metoclopramide]      Involuntary movement       Review of Systems   Constitutional: Positive for chills and fatigue. Negative for diaphoresis and fever.   HENT: Negative.  Negative for ear pain, nosebleeds, postnasal drip, rhinorrhea, sinus pressure, sneezing and sore throat.    Eyes: Negative.  Negative for redness and itching.   Respiratory: Positive for shortness of breath. Negative for cough and wheezing.    Cardiovascular: Negative.  Negative for chest pain, palpitations and leg swelling.   Gastrointestinal: Positive for abdominal pain (chronic), constipation, nausea and vomiting. Negative for diarrhea.        Pt has gastroparesis   Endocrine: Positive for cold intolerance. Negative for heat intolerance.   Genitourinary: Negative.  Negative for dysuria, frequency, hematuria and urgency.   Musculoskeletal: Negative.  Negative for arthralgias, back pain and neck pain.   Skin: Negative.  Negative for color change and rash.   Allergic/Immunologic: Positive for immunocompromised state. Negative for environmental allergies.   Neurological: Negative.  Negative for dizziness, syncope, light-headedness and headaches.   Hematological: Negative.  Negative for adenopathy. Does not bruise/bleed easily.   Psychiatric/Behavioral: Positive for sleep disturbance. Negative for dysphoric mood. The patient is nervous/anxious.        Objective   Physical Exam  Constitutional:       Comments: Video visit   HENT:      Head: Normocephalic and atraumatic.   Eyes:      Extraocular Movements: Extraocular movements intact.   Pulmonary:      Effort: Pulmonary effort is normal. No respiratory distress.    Musculoskeletal:      Cervical back: Normal range of motion.   Neurological:      Mental Status: She is alert and oriented to person, place, and time.   Psychiatric:         Mood and Affect: Mood normal.         Behavior: Behavior normal.         Thought Content: Thought content normal.         Judgment: Judgment normal.         Assessment & Plan   Diagnoses and all orders for this visit:    1. Pulmonary nodule, right (Primary)  -     Cancel: Ambulatory Referral to Pulmonology  -     Ambulatory Referral to Pulmonology    2. Abnormal finding on urinalysis  -     Urine Culture - , Urine, Clean Catch; Future  -     Urinalysis With Microscopic - Urine, Clean Catch; Future    3. Concern about end of life    4. Gastroparesis  -     ondansetron (ZOFRAN) 4 MG tablet; Take 2 tablets by mouth Every 8 (Eight) Hours As Needed for Nausea or Vomiting.  Dispense: 60 tablet; Refill: 3    5. Nausea and vomiting, unspecified vomiting type  -     ondansetron (ZOFRAN) 4 MG tablet; Take 2 tablets by mouth Every 8 (Eight) Hours As Needed for Nausea or Vomiting.  Dispense: 60 tablet; Refill: 3                   Current Outpatient Medications:   •  ondansetron (ZOFRAN) 4 MG tablet, Take 2 tablets by mouth Every 8 (Eight) Hours As Needed for Nausea or Vomiting., Disp: 60 tablet, Rfl: 3  •  ALPHA LIPOIC ACID PO, Take 400 mg by mouth Daily., Disp: , Rfl:   •  B Complex Vitamins (VITAMIN B COMPLEX PO), Take 1 tablet by mouth Daily., Disp: , Rfl:   •  Biotin 58958 MCG tablet, Take 1 tablet by mouth Daily., Disp: , Rfl:   •  Buprenorphine HCl (Belbuca) 75 MCG film, Apply 75 mcg to cheek Daily for 30 days., Disp: 30 each, Rfl: 0  •  busPIRone (BUSPAR) 5 MG tablet, TAKE ONE TABLET BY MOUTH TWICE A DAY, Disp: 180 tablet, Rfl: 1  •  Continuous Blood Gluc Sensor (Dexcom G6 Sensor), 1 each Take As Directed., Disp: 3 each, Rfl: 11  •  Continuous Blood Gluc Transmit (Dexcom G6 Transmitter) misc, 1 each Take As Directed., Disp: 1 each, Rfl: 3  •   glucagon (GLUCAGEN) 1 MG injection, Inject 1 mg into the appropriate muscle as directed by prescriber 1 (One) Time As Needed for Low Blood Sugar for up to 1 dose., Disp: 1 kit, Rfl: 11  •  glucose blood (Contour Next Test) test strip, TEST 4 TIMES DAILY dx:  e10.65, Disp: 150 each, Rfl: 11  •  Insulin Disposable Pump (Omnipod Classic Pods, Gen 3,) misc, USE 1 POD EVERY THIRD DAY, Disp: 30 each, Rfl: 1  •  insulin lispro (humaLOG) 100 UNIT/ML injection, USE IN PUMP 0.45 UNITS PER HOUR BASAL DOSE WITH 2 UNITS PER 30 GRAMS OF CARB BOLUS, WITH MEALS OR SNACKS.  MAX DAILY DOSE OF 45 UNITS., Disp: 40 mL, Rfl: 1  •  Insulin Lispro (humaLOG) 100 UNIT/ML injection, USE IN PUMP 0.45 UNITS PER HOUR BASAL DOSE WITH 2 UNITS PER 30 GRAMS OF CARB BOLUS, WITH MEALS OR SNACKS.  MAX DAILY DOSE OF 45 UNITS. MUST KEEP APPT. FOR FUTURE REFILLS, Disp: 40 mL, Rfl: 2  •  MAGNESIUM GLYCINATE PLUS PO, Take 1 tablet by mouth Daily., Disp: , Rfl:   •  Medium Chain Triglycerides (MCT OIL PO), Take 15 mL by mouth Daily., Disp: , Rfl:   •  Multiple Vitamins-Minerals (AQUADEKS PO), Take  by mouth., Disp: , Rfl:   •  pancrelipase, Lip-Prot-Amyl, (Creon) 66680-97739 units capsule delayed-release particles capsule, TAKE 4 TO 5 CAPSULES BY MOUTH THREE TIMES A DAY WITH MEALS AND 2 TO 3 CAPSULES WITH SNACK, Disp: 720 capsule, Rfl: 3  •  pantoprazole (PROTONIX) 40 MG EC tablet, Take 1 tablet by mouth Daily., Disp: 90 tablet, Rfl: 1  •  Probiotic Product (PROBIOTIC DAILY PO), Take  by mouth., Disp: , Rfl:   •  riFAXIMin (XIFAXAN) 550 MG tablet, Take 550 mg by mouth As Needed., Disp: , Rfl:   •  sertraline (ZOLOFT) 25 MG tablet, Take 1 tablet by mouth Daily., Disp: 30 tablet, Rfl: 3  •  tretinoin (Retin-A) 0.05 % cream, Apply  topically to the appropriate area as directed Every Night., Disp: 20 g, Rfl: 2  •  Unable to find, Take 500 mg by mouth Daily. Bacopa, Disp: , Rfl:   •  Unable to find, Take 1 each by mouth Daily. Med Name:Jannet Disp: , Rfl:    •  Unable to find, 1 each 1 (One) Time. Med Name: Triphala, Disp: , Rfl:   •  Unable to find, Take 1 each by mouth Daily. Med Name: Calcium, Disp: , Rfl:   •  vitamin C (ASCORBIC ACID) 500 MG tablet, Take 500 mg by mouth Daily., Disp: , Rfl:   •  vitamin D (ERGOCALCIFEROL) 46930 UNITS capsule capsule, TAKE ONE CAPSULE BY MOUTH EVERY 7 DAYS, Disp: 4 capsule, Rfl: 0  •  zolpidem (AMBIEN) 10 MG tablet, Take 1 tablet by mouth At Night As Needed for Sleep for up to 30 days., Disp: 30 tablet, Rfl: 0    Return in about 6 months (around 6/27/2023), or if symptoms worsen or fail to improve, for Recheck.     I spent 29 minutes caring for Renée on this date of service. This time includes time spent by me in the following activities: preparing for the visit, reviewing tests, obtaining and/or reviewing a separately obtained history, performing a medically appropriate examination and/or evaluation, counseling and educating the patient/family/caregiver, ordering medications, tests, or procedures, referring and communicating with other health care professionals and documenting information in the medical record

## 2022-12-27 NOTE — TELEPHONE ENCOUNTER
[facility] is needing a CT of the chest without contrast and are requesting that it be sent to them before the 17th of Jan.

## 2023-01-10 DIAGNOSIS — F41.9 ANXIETY AND DEPRESSION: ICD-10-CM

## 2023-01-10 DIAGNOSIS — F32.A ANXIETY AND DEPRESSION: ICD-10-CM

## 2023-01-10 RX ORDER — SERTRALINE HYDROCHLORIDE 25 MG/1
TABLET, FILM COATED ORAL
Qty: 30 TABLET | Refills: 3 | Status: SHIPPED | OUTPATIENT
Start: 2023-01-10

## 2023-01-11 DIAGNOSIS — G47.00 INSOMNIA, UNSPECIFIED TYPE: ICD-10-CM

## 2023-01-11 RX ORDER — ZOLPIDEM TARTRATE 10 MG/1
TABLET ORAL
Qty: 30 TABLET | Refills: 0 | Status: SHIPPED | OUTPATIENT
Start: 2023-01-13 | End: 2023-01-19 | Stop reason: SDUPTHER

## 2023-01-11 NOTE — TELEPHONE ENCOUNTER
YADI #: [id number]    Medication requested: Ambien 10mg    Last fill date: 12/14/2022    Last appointment: 12/14/2022    Next appointment: no appt on file

## 2023-01-16 ENCOUNTER — OFFICE VISIT (OUTPATIENT)
Dept: ENDOCRINOLOGY | Facility: CLINIC | Age: 63
End: 2023-01-16
Payer: MEDICARE

## 2023-01-16 VITALS
HEART RATE: 82 BPM | HEIGHT: 64 IN | WEIGHT: 135 LBS | SYSTOLIC BLOOD PRESSURE: 122 MMHG | OXYGEN SATURATION: 96 % | BODY MASS INDEX: 23.05 KG/M2 | DIASTOLIC BLOOD PRESSURE: 70 MMHG

## 2023-01-16 DIAGNOSIS — K31.84 GASTROPARESIS: ICD-10-CM

## 2023-01-16 DIAGNOSIS — Z90.410 DIABETES MELLITUS SECONDARY TO PANCREATECTOMY: ICD-10-CM

## 2023-01-16 DIAGNOSIS — Z46.81 INSULIN PUMP TITRATION: ICD-10-CM

## 2023-01-16 DIAGNOSIS — E89.1 DIABETES MELLITUS SECONDARY TO PANCREATECTOMY: ICD-10-CM

## 2023-01-16 DIAGNOSIS — E10.65 TYPE 1 DIABETES MELLITUS WITH HYPERGLYCEMIA: Primary | ICD-10-CM

## 2023-01-16 DIAGNOSIS — E13.9 DIABETES MELLITUS SECONDARY TO PANCREATECTOMY: ICD-10-CM

## 2023-01-16 DIAGNOSIS — R30.0 DYSURIA: ICD-10-CM

## 2023-01-16 LAB
ANION GAP SERPL CALCULATED.3IONS-SCNC: 12.3 MMOL/L (ref 5–15)
BACTERIA UR QL AUTO: ABNORMAL /HPF
BILIRUB UR QL STRIP: NEGATIVE
BUN SERPL-MCNC: 16 MG/DL (ref 8–23)
BUN/CREAT SERPL: 21.9 (ref 7–25)
CALCIUM SPEC-SCNC: 9.5 MG/DL (ref 8.6–10.5)
CHLORIDE SERPL-SCNC: 101 MMOL/L (ref 98–107)
CLARITY UR: CLEAR
CO2 SERPL-SCNC: 27.7 MMOL/L (ref 22–29)
COLOR UR: ABNORMAL
CREAT SERPL-MCNC: 0.73 MG/DL (ref 0.57–1)
EGFRCR SERPLBLD CKD-EPI 2021: 92 ML/MIN/1.73
EXPIRATION DATE: ABNORMAL
EXPIRATION DATE: NORMAL
GLUCOSE BLDC GLUCOMTR-MCNC: 160 MG/DL (ref 70–130)
GLUCOSE SERPL-MCNC: 118 MG/DL (ref 65–99)
GLUCOSE UR STRIP-MCNC: NEGATIVE MG/DL
HBA1C MFR BLD: 6.9 %
HGB UR QL STRIP.AUTO: NEGATIVE
HYALINE CASTS UR QL AUTO: ABNORMAL /LPF
KETONES UR QL STRIP: ABNORMAL
LEUKOCYTE ESTERASE UR QL STRIP.AUTO: ABNORMAL
Lab: ABNORMAL
Lab: NORMAL
NITRITE UR QL STRIP: NEGATIVE
PH UR STRIP.AUTO: 5.5 [PH] (ref 5–8)
POTASSIUM SERPL-SCNC: 4.1 MMOL/L (ref 3.5–5.2)
PROT UR QL STRIP: ABNORMAL
RBC # UR STRIP: ABNORMAL /HPF
REF LAB TEST METHOD: ABNORMAL
SODIUM SERPL-SCNC: 141 MMOL/L (ref 136–145)
SP GR UR STRIP: >=1.03 (ref 1–1.03)
SQUAMOUS #/AREA URNS HPF: ABNORMAL /HPF
UROBILINOGEN UR QL STRIP: ABNORMAL
WBC # UR STRIP: ABNORMAL /HPF

## 2023-01-16 PROCEDURE — 3044F HG A1C LEVEL LT 7.0%: CPT | Performed by: INTERNAL MEDICINE

## 2023-01-16 PROCEDURE — 87086 URINE CULTURE/COLONY COUNT: CPT | Performed by: INTERNAL MEDICINE

## 2023-01-16 PROCEDURE — 82947 ASSAY GLUCOSE BLOOD QUANT: CPT | Performed by: INTERNAL MEDICINE

## 2023-01-16 PROCEDURE — 84681 ASSAY OF C-PEPTIDE: CPT | Performed by: INTERNAL MEDICINE

## 2023-01-16 PROCEDURE — 80048 BASIC METABOLIC PNL TOTAL CA: CPT | Performed by: INTERNAL MEDICINE

## 2023-01-16 PROCEDURE — 99214 OFFICE O/P EST MOD 30 MIN: CPT | Performed by: INTERNAL MEDICINE

## 2023-01-16 PROCEDURE — 83036 HEMOGLOBIN GLYCOSYLATED A1C: CPT | Performed by: INTERNAL MEDICINE

## 2023-01-16 PROCEDURE — 81001 URINALYSIS AUTO W/SCOPE: CPT | Performed by: INTERNAL MEDICINE

## 2023-01-16 PROCEDURE — 87186 SC STD MICRODIL/AGAR DIL: CPT | Performed by: INTERNAL MEDICINE

## 2023-01-16 PROCEDURE — 36415 COLL VENOUS BLD VENIPUNCTURE: CPT | Performed by: INTERNAL MEDICINE

## 2023-01-16 PROCEDURE — 87077 CULTURE AEROBIC IDENTIFY: CPT | Performed by: INTERNAL MEDICINE

## 2023-01-16 RX ORDER — PROCHLORPERAZINE 25 MG/1
1 SUPPOSITORY RECTAL TAKE AS DIRECTED
Qty: 1 EACH | Refills: 3 | Status: SHIPPED | OUTPATIENT
Start: 2023-01-16

## 2023-01-16 RX ORDER — PROCHLORPERAZINE 25 MG/1
1 SUPPOSITORY RECTAL ONCE
Qty: 1 EACH | Refills: 0 | Status: SHIPPED | OUTPATIENT
Start: 2023-01-16 | End: 2023-01-16

## 2023-01-16 RX ORDER — INSULIN PMP CART,AUT,G6/7,CNTR
1 EACH SUBCUTANEOUS DAILY
Qty: 1 EACH | Refills: 0 | Status: SHIPPED | OUTPATIENT
Start: 2023-01-16 | End: 2023-02-23 | Stop reason: SDUPTHER

## 2023-01-16 RX ORDER — PROCHLORPERAZINE 25 MG/1
1 SUPPOSITORY RECTAL TAKE AS DIRECTED
Qty: 9 EACH | Refills: 3 | Status: SHIPPED | OUTPATIENT
Start: 2023-01-16

## 2023-01-16 NOTE — PROGRESS NOTES
Chief Complaint   Patient presents with   • Diabetes          HPI   Renée Laurent is a 62 y.o. female had concerns including Diabetes.    She is checking blood sugars 4+ times per day.   Insurance previously declined CGM.  Data reviewed from 1/3/2023 through 1/16/2023 shows an average daily dose of insulin 17.1 units, 68% basal, 32% bolus.  She has morning hypoglycemia, ranging 50s to 80s most of the time.  Occasional hyperglycemia later in the day.  Most BG's are in the 100s.  Average entered  with a standard deviation of 49, entering 4.6 glucose levels per day.    Pt is struggling with her multiple medical conditions - pain management, gastroparesis and flares, etc and is considering transition to end of life care - even may move out of the country to coordinate this. She denies depression. Has no suicidal ideation.    The following portions of the patient's history were reviewed and updated as appropriate: allergies, current medications, past family history, past medical history, past social history, past surgical history and problem list.      Review of Systems   Constitutional: Positive for activity change, appetite change and fatigue.   Gastrointestinal: Positive for abdominal pain.   Endocrine:        See HPI        Physical Exam  Vitals reviewed.   Constitutional:       Appearance: Normal appearance.   Cardiovascular:      Rate and Rhythm: Normal rate.      Pulses:           Dorsalis pedis pulses are 2+ on the right side and 2+ on the left side.   Pulmonary:      Effort: Pulmonary effort is normal.   Feet:      Right foot:      Skin integrity: Skin integrity normal.      Toenail Condition: Right toenails are normal.      Left foot:      Toenail Condition: Left toenails are normal.      Comments: Diabetic Foot Exam Performed and Monofilament Test Performed    Monofilament 5/5 bilaterally      Neurological:      General: No focal deficit present.      Mental Status: She is alert. Mental status  "is at baseline.   Psychiatric:         Mood and Affect: Mood normal.         Behavior: Behavior normal.        /70 (BP Location: Left arm, Patient Position: Sitting, Cuff Size: Adult)   Pulse 82   Ht 162.6 cm (64\")   Wt 61.2 kg (135 lb)   LMP  (LMP Unknown) Comment: scheduled for mammo  SpO2 96%   BMI 23.17 kg/m²      Labs and imaging    CMP:  Lab Results   Component Value Date    BUN 10 12/16/2022    CREATININE 0.76 12/16/2022    EGFRIFNONA 76 10/20/2021    BCR 13.2 12/16/2022     12/16/2022    K 4.4 12/16/2022    CO2 17.0 (L) 12/16/2022    CALCIUM 9.4 12/16/2022    PROTENTOTREF 7.7 01/13/2017    ALBUMIN 4.30 12/16/2022    LABGLOBREF 3.8 01/13/2017    LABIL2 1.0 01/13/2017    BILITOT 0.6 12/16/2022    ALKPHOS 148 (H) 12/16/2022    AST 29 12/16/2022    ALT 14 12/16/2022     Lipid Panel:  Lab Results   Component Value Date    CHOL 146 05/07/2021    TRIG 97 05/07/2021    HDL 42 05/07/2021    VLDL 18 05/07/2021    LDL 86 05/07/2021     HbA1c:  Lab Results   Component Value Date    HGBA1C 6.9 01/16/2023    HGBA1C 7.2 10/20/2021     Glucose:    Lab Results   Component Value Date    POCGLU 160 (A) 01/16/2023     Microalbumin:  Lab Results   Component Value Date    MALBCRERATIO  09/24/2020      Comment:      Unable to calculate     TSH:  Lab Results   Component Value Date    TSH 2.940 05/07/2021     Lab Results   Component Value Date    CPEPTIDE 0.7 (L) 10/20/2021       Assessment and plan  Diagnoses and all orders for this visit:    1. Type 1 diabetes mellitus with hyperglycemia (HCC) (Primary)  Uncontrolled with too frequent hypoglycemia. Patient has diabetes secondary to pancreatectomy but for all intents and purposes is a type 1 diabetic for which insulin pump and CGM should be covered.  Can we check C-peptide level today.  Diabetes complicated by gastroparesis.  Changes to the pump made as below.  Prescription sent for Dexcom and OmniPod 5.  Check urine microalbumin after symptoms of UTI have " resolved.  Monofilament updated today.  Ophtho exam should be updated yearly.  -     POC Glucose, Blood  -     POC Glycosylated Hemoglobin (Hb A1C)  -     Continuous Blood Gluc  (Dexcom G6 ) device; 1 each 1 (One) Time for 1 dose.  Dispense: 1 each; Refill: 0  -     Continuous Blood Gluc Sensor (Dexcom G6 Sensor); 1 each Take As Directed.  Dispense: 9 each; Refill: 3  -     Continuous Blood Gluc Transmit (Dexcom G6 Transmitter) misc; 1 each Take As Directed.  Dispense: 1 each; Refill: 3  -     C-Peptide  -     Basic Metabolic Panel  -     Insulin Disposable Pump (Omnipod 5 G6 Pod, Gen 5,) misc; 1 each Daily.  Dispense: 1 each; Refill: 0    2. 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    3. Insulin pump titration  Decreased basal rates between midnight to 8 AM to 0.4 units/h decreasing total daily basal insulin to 11.2 units.  Change BG correction threshold in target range to 130 from 100/110.    4. Gastroparesis  Complicating all management of diabetes and her quality of life.    5. Dysuria  PCP was checking urinalysis with culture.  Will forward results to PCP for management, reentered order to draw while in our office.  -     Urinalysis With Culture If Indicated - Urine, Clean Catch         Return in about 3 months (around 4/16/2023) for next scheduled follow up. The patient was instructed to contact the clinic with any interval questions or concerns.    Betty Mathis, DO   Endocrinologist    Please note that portions of this note were completed with a voice recognition program.

## 2023-01-17 ENCOUNTER — TELEPHONE (OUTPATIENT)
Dept: ENDOCRINOLOGY | Facility: CLINIC | Age: 63
End: 2023-01-17

## 2023-01-17 NOTE — TELEPHONE ENCOUNTER
WE SEND A PRESCRIPTION FOR 1 OMNI POD THE OMNI POD COMES IN A BOX OF 5. THEY NEED CLARIFICATION ON DISPENSING THIS PRESCRIPTION. PHONE NUMBER [phone]

## 2023-01-18 ENCOUNTER — PATIENT MESSAGE (OUTPATIENT)
Dept: INTERNAL MEDICINE | Facility: CLINIC | Age: 63
End: 2023-01-18
Payer: MEDICARE

## 2023-01-18 ENCOUNTER — TELEPHONE (OUTPATIENT)
Dept: INTERNAL MEDICINE | Facility: CLINIC | Age: 63
End: 2023-01-18
Payer: MEDICARE

## 2023-01-18 DIAGNOSIS — N30.00 ACUTE CYSTITIS WITHOUT HEMATURIA: Primary | ICD-10-CM

## 2023-01-18 LAB — C PEPTIDE SERPL-MCNC: 0.5 NG/ML (ref 1.1–4.4)

## 2023-01-18 RX ORDER — NITROFURANTOIN 25; 75 MG/1; MG/1
100 CAPSULE ORAL 2 TIMES DAILY
Qty: 20 CAPSULE | Refills: 0 | Status: SHIPPED | OUTPATIENT
Start: 2023-01-18

## 2023-01-18 NOTE — TELEPHONE ENCOUNTER
Macrobid 1 twice a day sent to [facility] at [facility] for UTI that was found at endocrinology.  Will send text

## 2023-01-19 ENCOUNTER — TELEMEDICINE (OUTPATIENT)
Dept: PALLIATIVE CARE | Facility: CLINIC | Age: 63
End: 2023-01-19
Payer: MEDICARE

## 2023-01-19 VITALS — WEIGHT: 135 LBS | HEIGHT: 64 IN | BODY MASS INDEX: 23.05 KG/M2

## 2023-01-19 DIAGNOSIS — G89.4 CHRONIC PAIN SYNDROME: ICD-10-CM

## 2023-01-19 DIAGNOSIS — K31.84 GASTROPARESIS: Primary | ICD-10-CM

## 2023-01-19 DIAGNOSIS — G47.00 INSOMNIA, UNSPECIFIED TYPE: ICD-10-CM

## 2023-01-19 PROCEDURE — 99214 OFFICE O/P EST MOD 30 MIN: CPT | Performed by: PHYSICIAN ASSISTANT

## 2023-01-19 RX ORDER — ZOLPIDEM TARTRATE 10 MG/1
10 TABLET ORAL NIGHTLY PRN
Qty: 30 TABLET | Refills: 0 | Status: SHIPPED | OUTPATIENT
Start: 2023-01-19 | End: 2023-02-14 | Stop reason: SDUPTHER

## 2023-01-19 NOTE — PROGRESS NOTES
Palliative Clinic Note      Name: Renée Laurent  Age: 62 y.o.  Sex: female  : 1960  MRN: [id number]  Date of Service: 2023   Mode of visit: video-conference  Location of patient: Home  [place] [facility]    Subjective:    Chief Complaint: Follow-up for symptom management    History of Present Illness: Renée Laurent is a 62 y.o. female with past medical history significant for gastroparesis, chronic pancreatitis, post pancreatectomy diabetes, status post splenectomy, hyperlipidemia, asthma, GERD, and mood disorder who presents via video-conference today as a follow up for pain and symptom management.     Treatment summary: History of chronic pancreatitis status post pancreatectomy in  with autotransplantation of the islets.  Also suffers from gastroparesis with placement of gastric pacemaker in 2018. Unfortunately, the abdominal pain, bloating, nausea/vomiting and weight loss continued despite intervention. Symptoms limit activity outside of home and have caused her to miss many medical appointments. The patient decided to against feeding tube. ED visit on 22 for severe nausea, vomiting and abdominal pain. Patient left AMA after her symptoms improved.  Patient notified by PCP of critical labs when she returned home. Patient shares that she has applied for medically assisted death out of country.  She has been considering this for more than 2 years and feels at peace with her decision.       Pain: Concurrent history of chronic cervical neck pain status post discectomy, fusion, and rhizotomy. Status post implantation of a spinal stimulator by Dr. Kumar in  with 60-70% improvement in pain. Discussed with patient that [facility] [place] [facility] prescribes opioid therapy for pain related to cancer. The patient was referred to pain specialist Dr. ESTEFANIA Knight, however decided this was not the route she wanted to go. Their clinic policy stated the patient  "would be dismissed for > 3 missed appointments and she has a hard time making appointments due to her complex medical history. The patient was referred to home palliative through [facility] but has not heard from them yet.  Due to the high cost of the medication, the patient decided taper off of the Belbuca. She she reports stopping the 75 mcg/h films 2 weeks ago and denies withdrawal symptoms.     Other symptoms: The patient reports occasional GI flare ups related to the gastroparesis. Patient is taking the Ambien for sleep.      Pyschosocial: The patient lives alone. GI flareups have a significant impact on her quality of life.  She reports feeling overwhelmed and tired of being \"unreliable.\"  Patient has children and grandchildren in [facility] but does not get to see them often due to her illness.  Overall mood is stable with some intermittent anxiety.  Patient managing this with Zoloft 25 mg daily and non-pharmacotherapy. Patient follows with a psychiatrist.      Goals:  Hoping to improve quality of life with management of symptoms.     The following portions of the patient's history were reviewed and updated as appropriate: allergies, current medications, past family history, past medical history, past social history, past surgical history and problem list.    ORT-R: Low risk  Decisional capacity: Full  ECOG: (1) Restricted in physically strenuous activity, ambulatory and able to do work of light nature   Palliative Performance Scale Score: 60%     Objective:    Constitutional: Awake, alert, sitting up   Eyes: PERRLA, EOMS intact  HENT: NCAT, face symmetric  Neck: Supple, trachea midline  Respiratory: Nonlabored respirations  Musculoskeletal: Moves all extremities   Psychiatric: Appropriate affect, cooperative  Neurologic: Oriented x 3, Cranial Nerves grossly intact to confrontation, speech clear    Medication Counts: Collected over the phone. See bottom of note for details. No " misuse or overuse evident.   I have reviewed the patient's [place] PDMP. YADI Req #[id number].   UDS (Y): Last 9/24/20. Patient is aware this needs to be collected.     Assessment & Plan:    1. Gastroparesis  -Patient shares that she has applied for medically assisted death out of country.  She has been considering this for more than 2 years and feels at peace with her decision.      2. Chronic pain syndrome  -The patient was referred to home palliative through [facility] but has not heard from them yet.  Will reach out to the home palliative program. Due to the high cost of the medication, the patient was tapered off of the Belbuca. She she reports stopping the 75 mcg/h films 2 weeks ago and denies withdrawal symptoms.    3. Insomnia, unspecified type  -zolpidem (AMBIEN) 10 MG tablet; Take 1 tablet by mouth At Night As Needed for Sleep for up to 30 days.  Dispense: 30 tablet; Refill: 0      No follow-ups on file.    The patient has chosen to receive care through a telehealth visit.   Does the patient consent to using a video visit for their medical care today? Yes   The visit included audio and video interaction. No technical issues occurred during this visit.  I spent 30 minutes caring for Renée Laurent on this date of service. This time includes time spent by me in the following activities: preparing for the visit, reviewing tests, obtaining and/or reviewing a separately obtained history, performing a medically appropriate examination and/or evaluation, counseling and educating the patient/family/caregiver, ordering medications, tests, or procedures, documenting information in the medical record, independently interpreting results and communicating that information with the patient/family/caregiver, and care coordination.    Riddhi Maddox PA-C  01/19/2023    Medication Date Filled # Filled Count Used # Days  KASSIE   Belbuca  12/16/22 30 --   0   Ambien 10 12/14/22 30 --   1

## 2023-01-19 NOTE — TELEPHONE ENCOUNTER
From: Renée Laurent  To: Karla Torres  Sent: 1/18/2023 2:56 PM EST  Subject: Results of lab tests    I saw Dr. Mathis on 1/16 and asked if I could do the urine tests you ordered while there so she re-entered the lab requests under her name. I’ve seen the results and looks like I might need antibiotics for a UTI? If so can you call them into [facility]? Kylie. Renée

## 2023-01-20 LAB
BACTERIA SPEC AEROBE CULT: ABNORMAL
BACTERIA SPEC AEROBE CULT: ABNORMAL

## 2023-02-06 ENCOUNTER — TELEPHONE (OUTPATIENT)
Dept: ENDOCRINOLOGY | Facility: CLINIC | Age: 63
End: 2023-02-06
Payer: MEDICARE

## 2023-02-06 ENCOUNTER — TELEPHONE (OUTPATIENT)
Dept: INTERNAL MEDICINE | Facility: CLINIC | Age: 63
End: 2023-02-06
Payer: MEDICARE

## 2023-02-06 ENCOUNTER — HOSPITAL ENCOUNTER (OUTPATIENT)
Dept: CT IMAGING | Facility: HOSPITAL | Age: 63
Discharge: HOME | End: 2023-02-06
Admitting: FAMILY MEDICINE
Payer: MEDICARE

## 2023-02-06 DIAGNOSIS — E10.65 TYPE 1 DIABETES MELLITUS WITH HYPERGLYCEMIA: Primary | ICD-10-CM

## 2023-02-06 DIAGNOSIS — R91.1 RIGHT LOWER LOBE PULMONARY NODULE: Primary | ICD-10-CM

## 2023-02-06 DIAGNOSIS — R91.1 PULMONARY NODULE, RIGHT: ICD-10-CM

## 2023-02-06 PROCEDURE — 71250 CT THORAX DX C-: CPT

## 2023-02-06 NOTE — TELEPHONE ENCOUNTER
Pt received her dexcom and omnipod 5 she needs to be scheduled with the trainers    pts number  [phone]

## 2023-02-07 ENCOUNTER — OFFICE VISIT (OUTPATIENT)
Dept: PULMONOLOGY | Facility: CLINIC | Age: 63
End: 2023-02-07
Payer: MEDICARE

## 2023-02-07 ENCOUNTER — NURSE NAVIGATOR (OUTPATIENT)
Dept: ONCOLOGY | Facility: CLINIC | Age: 63
End: 2023-02-07
Payer: MEDICARE

## 2023-02-07 VITALS
TEMPERATURE: 98 F | DIASTOLIC BLOOD PRESSURE: 80 MMHG | OXYGEN SATURATION: 97 % | WEIGHT: 134.8 LBS | SYSTOLIC BLOOD PRESSURE: 140 MMHG | HEART RATE: 101 BPM | HEIGHT: 64 IN | BODY MASS INDEX: 23.01 KG/M2

## 2023-02-07 DIAGNOSIS — R91.1 LUNG NODULE: Primary | ICD-10-CM

## 2023-02-07 DIAGNOSIS — R91.1 INCIDENTAL LUNG NODULE, GREATER THAN OR EQUAL TO 8MM: Primary | ICD-10-CM

## 2023-02-07 PROCEDURE — 99204 OFFICE O/P NEW MOD 45 MIN: CPT | Performed by: INTERNAL MEDICINE

## 2023-02-07 NOTE — PROGRESS NOTES
Initial Pulmonary Consult Note    Subjective   Reason for consultation/Chief Complaint: Lung Nodule    Renée Kasey Laurent is a 62 y.o. female is being seen for consultation today at the request of Karla EDDY MD    History of Present Illness    Ms. Laurent is a 61yo F with a history of gastroparesis, chronic pancreatitis, post pancreatectomy diabetes, splenectomy, HLD, Asthma, GERD and mood disorder who is referred for an incidental right lower lobe pulmonary nodule first seen on CT abdomen/pelvis from December 2022. She underwent a CT Chest on 2/6/23 which showed a stable nodule.    Active Ambulatory Problems     Diagnosis Date Noted   • Anxiety and depression 06/01/2016   • Epigastric pain 06/01/2016   • Post-pancreatectomy diabetes (HCC) 06/01/2016   • Status post splenectomy 06/01/2016   • Gastroparesis 06/01/2016   • Vitamin D deficiency 06/01/2016   • Gastroesophageal reflux disease with esophagitis 06/01/2016   • Myeloid leukemia (HCC) 06/01/2016   • Anxiety 06/01/2016   • Type 1 diabetes mellitus without complications (HCC) 09/04/2016   • Postsurgical malabsorption 09/04/2016   • Post splenectomy syndrome 09/04/2016   • Asthma 09/04/2016   • Hyperlipidemia 09/04/2016   • Iron deficiency anemia 09/04/2016   • Thyroid function test abnormal 09/04/2016   • Acquired total absence of pancreas 09/04/2016   • Osteopenia 09/04/2016   • Incisional hernia 09/04/2016   • Contact dermatitis due to plants, except food 09/04/2016   • Chronic abdominal pain 09/04/2016   • Adjustment disorder with depressed mood 09/04/2016   • Presence of gastric pacemaker 09/11/2019   • Leukocytosis 12/02/2019   • Pain medication agreement signed 02/20/2020   • Diabetic gastroparesis associated with type 1 diabetes mellitus (HCC) 06/30/2020   • Intractable abdominal pain 09/16/2020   • History of chronic pancreatitis 09/16/2020   • Encounter for fitting and adjustment of neuropacemaker of spinal cord 02/01/2021   • Abdominal  pain 03/10/2021   • Has health insurance with inadequate coverage of health expenses 05/04/2021   • UTI (urinary tract infection) 05/07/2021   • Constipation due to opioid therapy 08/26/2021   • Insomnia 09/28/2021     Resolved Ambulatory Problems     Diagnosis Date Noted   • No Resolved Ambulatory Problems     Past Medical History:   Diagnosis Date   • Absence of pancreas, acquired    • Allergic    • Anemia    • Basal cell carcinoma    • Bile reflux gastritis    • Chronic illness    • Contact dermatitis    • Depression    • Diabetes mellitus (HCC)    • Diabetes mellitus type I (HCC) 2011   • Eating disorder    • Encounter for dental examination 07/2014   • GERD (gastroesophageal reflux disease)    • History of medical problems 12/1/2011   • History of MRSA infection    • Hypoglycemia 2011   • Iron deficiency    • Kidney stone    • Osteoporosis 2015   • Pancreatitis    • Pneumonia 1/1/2017   • PONV (postoperative nausea and vomiting)    • Spinal headache    • Wears glasses        Past Surgical History:   Procedure Laterality Date   • ABDOMINAL SURGERY      x 10   • CERVICAL DISCECTOMY ANTERIOR  1997    ACDF w/ fusion -Dr. Jerald Lopez   • CHOLECYSTECTOMY     • CHOLECYSTECTOMY     • COLONOSCOPY     • COSMETIC SURGERY      Rhinoplasty   • GASTRIC STIMULATOR IMPLANT SURGERY  12/17/2018    [facility]   • HERNIA MESH REMOVAL  2014    abdominal hernia with mesh    • HYSTERECTOMY     • OTHER SURGICAL HISTORY      duodeum removed   • OTHER SURGICAL HISTORY      jejunem removed   • PANCREATECTOMY     • SMALL INTESTINE SURGERY  12/1/2011    Tp-ait   • SPINAL CORD STIMULATOR IMPLANT N/A 5/10/2021    Procedure: SPINAL CORD STIMULATOR INSERTION;  Surgeon: Kedar Estevez MD;  Location: [facility];  Service: Neurosurgery;  Laterality: N/A;   • TOTAL ABDOMINAL HYSTERECTOMY WITH SALPINGO OOPHORECTOMY     • TUBAL ABDOMINAL LIGATION     • UPPER GASTROINTESTINAL ENDOSCOPY  10/25/2017    Dr Mehta, gastritis, eosinphils in  esophagus       Family History   Problem Relation Age of Onset   • Osteoporosis Mother    • Cancer Mother         CLL   • Hashimoto's thyroiditis Mother    • Arthritis Mother    • Depression Mother    • Thyroid disease Mother         Hashimoto’s   • Lung cancer Father    • Alcohol abuse Father    • Cancer Father         Lung   • Liver disease Father    • Asthma Sister        Social History     Socioeconomic History   • Marital status: Single   Tobacco Use   • Smoking status: Never   • Smokeless tobacco: Never   Vaping Use   • Vaping Use: Never used   Substance and Sexual Activity   • Alcohol use: No   • Drug use: No   • Sexual activity: Not Currently     Partners: Male     Birth control/protection: Post-menopausal       Allergies   Allergen Reactions   • Aspirin Hives   • Phenergan [Promethazine Hcl]      Anxiety/relessness   • Reglan [Metoclopramide]      Involuntary movement       Current Outpatient Medications   Medication Sig Dispense Refill   • ALPHA LIPOIC ACID PO Take 400 mg by mouth Daily.     • B Complex Vitamins (VITAMIN B COMPLEX PO) Take 1 tablet by mouth Daily.     • Biotin 24835 MCG tablet Take 1 tablet by mouth Daily.     • busPIRone (BUSPAR) 5 MG tablet TAKE ONE TABLET BY MOUTH TWICE A  tablet 1   • Continuous Blood Gluc Sensor (Dexcom G6 Sensor) 1 each Take As Directed. 3 each 11   • Continuous Blood Gluc Sensor (Dexcom G6 Sensor) 1 each Take As Directed. 9 each 3   • Continuous Blood Gluc Transmit (Dexcom G6 Transmitter) misc 1 each Take As Directed. 1 each 3   • glucagon (GLUCAGEN) 1 MG injection Inject 1 mg into the appropriate muscle as directed by prescriber 1 (One) Time As Needed for Low Blood Sugar for up to 1 dose. 1 kit 11   • glucose blood (Contour Next Test) test strip TEST 4 TIMES DAILY dx:  e10.65 150 each 11   • Insulin Disposable Pump (Omnipod 5 G6 Pod, Gen 5,) misc 1 each Daily. 1 each 0   • Insulin Lispro (humaLOG) 100 UNIT/ML injection USE IN PUMP 0.45 UNITS PER HOUR BASAL  DOSE WITH 2 UNITS PER 30 GRAMS OF CARB BOLUS, WITH MEALS OR SNACKS.  MAX DAILY DOSE OF 45 UNITS. MUST KEEP APPT. FOR FUTURE REFILLS 40 mL 2   • MAGNESIUM GLYCINATE PLUS PO Take 1 tablet by mouth Daily.     • Medium Chain Triglycerides (MCT OIL PO) Take 15 mL by mouth Daily.     • Multiple Vitamins-Minerals (AQUADEKS PO) Take  by mouth.     • nitrofurantoin, macrocrystal-monohydrate, (Macrobid) 100 MG capsule Take 1 capsule by mouth 2 (Two) Times a Day. 20 capsule 0   • ondansetron (ZOFRAN) 4 MG tablet Take 2 tablets by mouth Every 8 (Eight) Hours As Needed for Nausea or Vomiting. 60 tablet 3   • pancrelipase, Lip-Prot-Amyl, (Creon) 13305-35200 units capsule delayed-release particles capsule TAKE 4 TO 5 CAPSULES BY MOUTH THREE TIMES A DAY WITH MEALS AND 2 TO 3 CAPSULES WITH SNACK 720 capsule 3   • pantoprazole (PROTONIX) 40 MG EC tablet Take 1 tablet by mouth Daily. 90 tablet 1   • Probiotic Product (PROBIOTIC DAILY PO) Take  by mouth.     • riFAXIMin (XIFAXAN) 550 MG tablet Take 550 mg by mouth As Needed.     • sertraline (ZOLOFT) 25 MG tablet TAKE ONE TABLET BY MOUTH DAILY 30 tablet 3   • vitamin C (ASCORBIC ACID) 500 MG tablet Take 500 mg by mouth Daily.     • zolpidem (AMBIEN) 10 MG tablet Take 1 tablet by mouth At Night As Needed for Sleep for up to 30 days. 30 tablet 0   • Unable to find Take 500 mg by mouth Daily. Bacopa     • Unable to find Take 1 each by mouth Daily. Med Name:Ashwaghanda     • Unable to find 1 each 1 (One) Time. Med Name: Triphala     • Unable to find Take 1 each by mouth Daily. Med Name: Calcium       No current facility-administered medications for this visit.       Review of Systems   Constitutional: Positive for fatigue.   HENT: Negative.    Eyes: Negative.    Respiratory: Positive for shortness of breath.    Cardiovascular: Negative.    Gastrointestinal: Positive for abdominal distention and abdominal pain.   Endocrine: Negative.    Genitourinary: Negative.    Musculoskeletal: Negative.  "   Skin: Negative.    Allergic/Immunologic: Negative.    Neurological: Negative.    Hematological: Negative.    Psychiatric/Behavioral: Negative.          Objective   Blood pressure 140/80, pulse 101, temperature 98 °F (36.7 °C), temperature source Infrared, height 162.6 cm (64.02\"), weight 61.1 kg (134 lb 12.8 oz), SpO2 97 %.  Physical Exam  Constitutional:       General: She is not in acute distress.     Appearance: She is well-developed.   HENT:      Head: Normocephalic and atraumatic.      Right Ear: External ear normal.      Left Ear: External ear normal.      Nose: Nose normal.   Eyes:      Pupils: Pupils are equal, round, and reactive to light.   Neck:      Trachea: No tracheal deviation.   Pulmonary:      Effort: Pulmonary effort is normal. No respiratory distress.   Musculoskeletal:         General: Normal range of motion.      Cervical back: Normal range of motion and neck supple.   Skin:     General: Skin is warm.      Findings: No erythema or rash.      Nails: There is no clubbing.   Neurological:      Mental Status: She is alert and oriented to person, place, and time.   Psychiatric:         Behavior: Behavior normal.         Thought Content: Thought content normal.         Judgment: Judgment normal.         PFTs:  No PFTs available.     Imaging:  CT Chest from 2/6/23 reviewed. There is a circumscribed subpleural 1.6cm nodule at the periphery of the right lower lobe near the diaphragm which has been stable since 12/16/22.     Assessment & Plan   Diagnoses and all orders for this visit:    1. Incidental lung nodule, greater than or equal to 8mm (Primary)  -     CT Chest Without Contrast Diagnostic; Future        Discussion:  Ms. Laurent is a 61yo F who is referred for a pulmonary nodule.     1. Right Lower Lobe Pulmonary Nodule  - Measures 1.6cm and first noticed on CT abdomen/pelvis from 12/16/22.   - There are faint central calcifications which suggest this is a poorly calcified granuloma.   - We " will plan to repeat a CT scan of the chest in 6 months for further evaluation.   - In the meantime, we will try and see if she has had any recent CT imaging at [facility] or at .     2. Chronic Pancreatitis/Pain  - She may be pursuing medical aid in dying overseas.   - I have asked her to let us know if she chooses to go this route and we will cancel any further CT scans.     Follow up in 6 months after repeat CT Chest.        I have spent 47 minutes reviewing the patient record, face to face with the patient in discussion of test results and plans for further management and in documentation and coordination of care. Excluding time spent on other separate services such as performing procedures or test interpretation, if applicable.        Kasey V Case, DO  Pulmonary and Critical Care Medicine  Note Electronically Signed

## 2023-02-07 NOTE — PROGRESS NOTES
Met patient in lung nodule clinic with Dr. Tony. She has significant history with pancreatitis and pain. She had CT abd incidentally revealing 1.6cm RLL nodule with faint calcifications. Repeat CT chest revealed stability to this area. She does c/o SOA but attributes this to pain. Patient is considering going abroad to receive medically aided death. Scans reviewed. Per Dr. Tony repeat CT chest x6mo. Will try to obtain old images from [facility] or [facility] showing stability in this nodule. She v/u and agreeable to plan. Introduced lung navigator role and provided contact information. She knows to call with questions or concerns.

## 2023-02-09 NOTE — TELEPHONE ENCOUNTER
PATIENT CALLED TODAY TO FOLLOW UP ON APPT WITH DIABETES EDUCATION. PATIENT STATES THAT SHE ONLY HAS ABOUT ONE MONTH OF MEDICATION LEFT AND WOULD LIKE TO GET THIS TAKEN CARE OF AS SOON AS POSSIBLE.

## 2023-02-14 DIAGNOSIS — G47.00 INSOMNIA, UNSPECIFIED TYPE: ICD-10-CM

## 2023-02-14 RX ORDER — ZOLPIDEM TARTRATE 10 MG/1
10 TABLET ORAL NIGHTLY PRN
Qty: 30 TABLET | Refills: 0 | Status: SHIPPED | OUTPATIENT
Start: 2023-02-16 | End: 2023-03-15 | Stop reason: SDUPTHER

## 2023-02-23 DIAGNOSIS — E10.65 TYPE 1 DIABETES MELLITUS WITH HYPERGLYCEMIA: ICD-10-CM

## 2023-02-23 RX ORDER — INSULIN PMP CART,AUT,G6/7,CNTR
1 EACH SUBCUTANEOUS DAILY
Qty: 10 EACH | Refills: 5 | Status: SHIPPED | OUTPATIENT
Start: 2023-02-23

## 2023-02-23 NOTE — TELEPHONE ENCOUNTER
Brandon called office, she is needing an order placed for omnipod 5 pods. She would like this sent to [facility] [place]. Thank you!

## 2023-03-15 ENCOUNTER — PATIENT MESSAGE (OUTPATIENT)
Dept: PALLIATIVE CARE | Facility: CLINIC | Age: 63
End: 2023-03-15
Payer: MEDICARE

## 2023-03-15 ENCOUNTER — TELEPHONE (OUTPATIENT)
Dept: ENDOCRINOLOGY | Facility: CLINIC | Age: 63
End: 2023-03-15
Payer: MEDICARE

## 2023-03-15 DIAGNOSIS — G47.00 INSOMNIA, UNSPECIFIED TYPE: ICD-10-CM

## 2023-03-15 RX ORDER — ZOLPIDEM TARTRATE 10 MG/1
10 TABLET ORAL NIGHTLY PRN
Qty: 30 TABLET | Refills: 0 | Status: SHIPPED | OUTPATIENT
Start: 2023-03-18

## 2023-03-15 NOTE — TELEPHONE ENCOUNTER
Spoke with patient.  She states since her settings were adjusted, she is trending 170's-200 all day.  She states settings were changed due to hypoglycemia and she would like to know if this needs to be adjusted or to continue to do the same.  Omnipod download placed in Dr. Rhodes's box.

## 2023-03-15 NOTE — TELEPHONE ENCOUNTER
Called and spoke with patient. Gave her Dr. Mathis's corrections and she verbalized understanding. Recommended she update us again regarding her BG levels after using this change for a while. She returns for her f/u appt at the end of April.

## 2023-03-15 NOTE — TELEPHONE ENCOUNTER
PT CALLED STATING HER BS HAS BEEN RUNNING HIGH (AROUND 179) FOR THE PAST WEEK. SHE REQUESTED A CALL BACK TO CONSULT.

## 2023-03-15 NOTE — TELEPHONE ENCOUNTER
Pt needing refill of  zolpizolpidem (AMBIEN) 10 MG tabletdem (AMBIEN) 10 MG tablet    [facility] #:  [id number]    Medication requested:  (AMBIEN) 10 MG tablet    Last fill date: 2/16/2023    Last appointment: 1/19/2023    Next appointment: no appt on file

## 2023-03-27 ENCOUNTER — TELEPHONE (OUTPATIENT)
Dept: ENDOCRINOLOGY | Facility: CLINIC | Age: 63
End: 2023-03-27

## 2023-03-27 NOTE — TELEPHONE ENCOUNTER
Nini was calling from [facility] for a medical records request that was sent via fax on 3/24... she said they also sent it through another system without response as well.. She was just checking to see if we had gotten the request and are working on it... if we have not received it she asks that we call her to refax it to us.  Otherwise she will wait for our response. Thank you

## 2023-04-19 DIAGNOSIS — G47.00 INSOMNIA, UNSPECIFIED TYPE: ICD-10-CM

## 2023-04-19 RX ORDER — ZOLPIDEM TARTRATE 10 MG/1
10 TABLET ORAL NIGHTLY PRN
Qty: 30 TABLET | Refills: 0 | Status: SHIPPED | OUTPATIENT
Start: 2023-04-19 | End: 2023-05-19

## 2023-04-19 NOTE — TELEPHONE ENCOUNTER
Pt requesting medication refill listed below      [facility] #: [id number]    Medication requested: zolpidem (AMBIEN) 10 MG tablet    Last fill date: 3/19/23    Last appointment: 1/19/2023    Next appointment: no follow ups on file

## 2023-04-26 ENCOUNTER — TELEMEDICINE (OUTPATIENT)
Dept: ENDOCRINOLOGY | Facility: CLINIC | Age: 63
End: 2023-04-26
Payer: MEDICARE

## 2023-04-26 DIAGNOSIS — E10.65 TYPE 1 DIABETES MELLITUS WITH HYPERGLYCEMIA: Primary | ICD-10-CM

## 2023-04-26 DIAGNOSIS — Z46.81 INSULIN PUMP TITRATION: ICD-10-CM

## 2023-04-26 PROCEDURE — 3044F HG A1C LEVEL LT 7.0%: CPT | Performed by: INTERNAL MEDICINE

## 2023-04-26 PROCEDURE — 95251 CONT GLUC MNTR ANALYSIS I&R: CPT | Performed by: INTERNAL MEDICINE

## 2023-04-26 PROCEDURE — 99214 OFFICE O/P EST MOD 30 MIN: CPT | Performed by: INTERNAL MEDICINE

## 2023-04-26 NOTE — PROGRESS NOTES
Chief Complaint   Patient presents with   • Diabetes        HPI   Renée Laurent is a 62 y.o. female had concerns including Diabetes.      Visit was conducted via telemedicine. Consent was obtained from the patient to conduct a video visit.    Pump data reviewed from 4/13/2023 through 4/26/2023 shows she is in auto mode 95% of the time.  Total daily insulin 18.5 units, 58% basal (10.7 units.  CGM data reviewed over that time.  Shows an average glucose of 245 with a standard deviation of 86.  She in target range 29% the time, high 21%, very high 50%.  No low readings.  Significant postprandial hyperglycemia.,  BGs tend to stay high once elevated.    Due to gastroparesis she has a hard time tolerating complex carbs.     The following portions of the patient's history were reviewed and updated as appropriate: allergies, current medications, past family history, past medical history, past social history, past surgical history and problem list.    Review of Systems   Constitutional: Positive for activity change, appetite change and fatigue.   Gastrointestinal: Positive for abdominal pain.   Endocrine:        See HPI        LMP  (LMP Unknown) Comment: scheduled for mammo     Constitutional:  no acute distress   ENT/Thyroid: Not examined    Eyes: Not examined    Respiratory:  No conversational dyspnea    Cardiovascular:  Not performed.   Chest:  Not performed.   Abdomen: Not performed.   : Not performed.   Musculoskeletal: Not examined   Skin: not performed.   Neuro: mental status, speech normal, alert and oriented x3   Psych: oriented to time, place and person, mood and affect are within normal limits     CMP:  Lab Results   Component Value Date    BUN 16 01/16/2023    CREATININE 0.73 01/16/2023    EGFRIFNONA 76 10/20/2021    BCR 21.9 01/16/2023     01/16/2023    K 4.1 01/16/2023    CO2 27.7 01/16/2023    CALCIUM 9.5 01/16/2023    PROTENTOTREF 7.7 01/13/2017    ALBUMIN 4.30 12/16/2022    LABGLOBREF 3.8  01/13/2017    LABIL2 1.0 01/13/2017    BILITOT 0.6 12/16/2022    ALKPHOS 148 (H) 12/16/2022    AST 29 12/16/2022    ALT 14 12/16/2022     Lipid Panel:  Lab Results   Component Value Date    CHOL 146 05/07/2021    TRIG 97 05/07/2021    HDL 42 05/07/2021    VLDL 18 05/07/2021    LDL 86 05/07/2021     HbA1c:  Lab Results   Component Value Date    HGBA1C 6.9 01/16/2023    HGBA1C 7.2 10/20/2021     Glucose:    Lab Results   Component Value Date    POCGLU 160 (A) 01/16/2023     Microalbumin:  Lab Results   Component Value Date    MALBCRERATIO  09/24/2020      Comment:      Unable to calculate     TSH:  Lab Results   Component Value Date    TSH 2.940 05/07/2021       Assessment and Plan    Diagnoses and all orders for this visit:    1. Type 1 diabetes mellitus with hyperglycemia (Primary)  Uncontrolled with postprandial hyperglycemia.  Pump adjustments made as below.  Complicated by gastroparesis.    2. Insulin pump titration  Changed carb ratio from 25 to 20, BG target range from 120 to 130.       Return in about 3 months (around 7/26/2023) for next scheduled follow up. The patient was instructed to contact the clinic with any interval questions or concerns.    Betty Mathis, DO   Endocrinologist    Please note that portions of this note were completed with a voice recognition program.

## 2023-05-10 DIAGNOSIS — F41.9 ANXIETY: ICD-10-CM

## 2023-05-11 RX ORDER — BUSPIRONE HYDROCHLORIDE 5 MG/1
TABLET ORAL
Qty: 180 TABLET | Refills: 0 | Status: SHIPPED | OUTPATIENT
Start: 2023-05-11

## 2023-05-19 DIAGNOSIS — F41.9 ANXIETY AND DEPRESSION: ICD-10-CM

## 2023-05-19 DIAGNOSIS — F32.A ANXIETY AND DEPRESSION: ICD-10-CM

## 2023-05-19 RX ORDER — SERTRALINE HYDROCHLORIDE 25 MG/1
TABLET, FILM COATED ORAL
Qty: 30 TABLET | Refills: 3 | Status: SHIPPED | OUTPATIENT
Start: 2023-05-19

## 2023-05-19 NOTE — TELEPHONE ENCOUNTER
Medication requested: sertraline (ZOLOFT) 25 MG tablet    Last fill date: 1/10/23    Last appointment: 1/19/23    Next appointment: No follow up notes for next visit

## 2023-06-13 DIAGNOSIS — F32.A ANXIETY AND DEPRESSION: ICD-10-CM

## 2023-06-13 DIAGNOSIS — F41.9 ANXIETY AND DEPRESSION: ICD-10-CM

## 2023-06-13 DIAGNOSIS — G47.00 INSOMNIA, UNSPECIFIED TYPE: ICD-10-CM

## 2023-06-14 DIAGNOSIS — G47.00 INSOMNIA, UNSPECIFIED TYPE: ICD-10-CM

## 2023-06-14 RX ORDER — ZOLPIDEM TARTRATE 10 MG/1
TABLET ORAL
Qty: 30 TABLET | OUTPATIENT
Start: 2023-06-14

## 2023-06-14 RX ORDER — ZOLPIDEM TARTRATE 10 MG/1
10 TABLET ORAL NIGHTLY PRN
Qty: 30 TABLET | Refills: 0 | Status: SHIPPED | OUTPATIENT
Start: 2023-06-20 | End: 2023-07-20

## 2023-06-14 RX ORDER — SERTRALINE HYDROCHLORIDE 25 MG/1
25 TABLET, FILM COATED ORAL DAILY
Qty: 30 TABLET | Refills: 3 | Status: SHIPPED | OUTPATIENT
Start: 2023-06-18

## 2023-06-14 NOTE — TELEPHONE ENCOUNTER
[facility] #: [id number]    Medication requested: Zolpidem 10    Last fill date: 5/21/23    Last appointment: 1/19/23    Next appointment: none on file      Medication requested: sertraline (ZOLOFT) 25 MG tablet     Last fill date: 5/19/23

## 2023-08-08 DIAGNOSIS — G47.00 INSOMNIA, UNSPECIFIED TYPE: ICD-10-CM

## 2023-08-08 DIAGNOSIS — R11.2 NAUSEA AND VOMITING, UNSPECIFIED VOMITING TYPE: ICD-10-CM

## 2023-08-08 DIAGNOSIS — K31.84 GASTROPARESIS: ICD-10-CM

## 2023-08-08 DIAGNOSIS — E10.65 TYPE 1 DIABETES MELLITUS WITH HYPERGLYCEMIA: ICD-10-CM

## 2023-08-09 DIAGNOSIS — R11.2 NAUSEA AND VOMITING, UNSPECIFIED VOMITING TYPE: ICD-10-CM

## 2023-08-09 DIAGNOSIS — K31.84 GASTROPARESIS: ICD-10-CM

## 2023-08-09 RX ORDER — ONDANSETRON 4 MG/1
TABLET, FILM COATED ORAL
Qty: 60 TABLET | Refills: 3 | OUTPATIENT
Start: 2023-08-09

## 2023-08-09 RX ORDER — ZOLPIDEM TARTRATE 10 MG/1
10 TABLET ORAL NIGHTLY PRN
Qty: 30 TABLET | Refills: 0 | Status: SHIPPED | OUTPATIENT
Start: 2023-08-17 | End: 2023-09-16

## 2023-08-09 RX ORDER — INSULIN PMP CART,AUT,G6/7,CNTR
1 EACH SUBCUTANEOUS DAILY
Qty: 10 EACH | Refills: 5 | Status: SHIPPED | OUTPATIENT
Start: 2023-08-09

## 2023-08-09 NOTE — TELEPHONE ENCOUNTER
[facility] #: [id number]    Medication requested: zolpidem (AMBIEN) 10 MG tablet     Last fill date: 7/16/2023    Last appointment: 1/19/23    Next appointment: none on file

## 2023-09-06 DIAGNOSIS — G47.00 INSOMNIA, UNSPECIFIED TYPE: ICD-10-CM

## 2023-09-07 RX ORDER — ZOLPIDEM TARTRATE 10 MG/1
10 TABLET ORAL NIGHTLY PRN
Qty: 30 TABLET | Refills: 0 | Status: SHIPPED | OUTPATIENT
Start: 2023-09-13 | End: 2023-10-13

## 2023-09-07 NOTE — TELEPHONE ENCOUNTER
[facility] #: [id number]    Medication requested: zolpidem (AMBIEN) 10 MG tablet     Last fill date: 8/14/23    Last appointment: 1/19/23    Next appointment: no follow ups on file

## 2023-09-11 RX ORDER — INSULIN LISPRO 100 [IU]/ML
INJECTION, SOLUTION INTRAVENOUS; SUBCUTANEOUS
Qty: 40 ML | Refills: 1 | Status: SHIPPED | OUTPATIENT
Start: 2023-09-11

## 2023-09-11 NOTE — TELEPHONE ENCOUNTER
Rx Refill Note  Requested Prescriptions     Pending Prescriptions Disp Refills    Insulin Lispro (humaLOG) 100 UNIT/ML injection [Pharmacy Med Name: INSULIN LISPRO 100 UNIT/ML VL] 40 mL 2     Sig: USE 0.45 UNITS PER HOUR BASAL DOSE WITH 2 UNITS PER 30 GRAMS OF CARB BOLUS WITH MEALS OR SNACKS      Last office visit with prescribing clinician: 1/16/2023     Next office visit with prescribing clinician: Visit date not found       Heath Pompa MA  09/11/23, 08:16 EDT

## 2023-09-22 ENCOUNTER — PATIENT MESSAGE (OUTPATIENT)
Dept: INTERNAL MEDICINE | Facility: CLINIC | Age: 63
End: 2023-09-22
Payer: MEDICARE

## 2023-10-03 DIAGNOSIS — G47.00 INSOMNIA, UNSPECIFIED TYPE: ICD-10-CM

## 2023-10-04 RX ORDER — ZOLPIDEM TARTRATE 10 MG/1
10 TABLET ORAL NIGHTLY PRN
Qty: 30 TABLET | Refills: 0 | Status: SHIPPED | OUTPATIENT
Start: 2023-10-11 | End: 2023-11-10

## 2023-10-04 NOTE — TELEPHONE ENCOUNTER
[facility] #: [id number]    Medication requested: zolpidem (AMBIEN) 10 MG tablet     Last fill date: 9/11/23    Last appointment: 1/19/23    Next appointment: none on file

## 2023-10-04 NOTE — TELEPHONE ENCOUNTER
From: Renée Laurent  To: Karla Torres  Sent: 9/22/2023 2:17 PM EDT  Subject: Portable Oxygen request    Dr. Torres,    I wanted to see if it’s possible for me to get a prescription for supplemental oxygen (POC - concentrator) that I can use between now and November 8, 2023. I’m having difficulty with my breathing which is further impacting my ability to function or go anywhere. In the remaining 7 weeks until my medical aid in dying I’d like to be able to have portable O2 available - especially for travel to [place] on 11/4 - to make me more comfortable. Is this something I can get through Medicare? These are the symptoms I’ve been experiencing:  Tiredness  Shortness of breath (which makes me lightheaded and dizzy)  Memory and concentration problems  Confusion or “brain fog”  Coughing - dry  Headache  Rapid heart rate (causes shaking)  Bluish color in my fingernails and lips - at times    A few weeks ago I aspirated gastric juices into my lungs during a gastroparesis flare which has made the above symptoms worse.    Thanks so much for your help with this. Renée

## 2023-10-18 ENCOUNTER — TELEMEDICINE (OUTPATIENT)
Dept: INTERNAL MEDICINE | Facility: CLINIC | Age: 63
End: 2023-10-18
Payer: MEDICARE

## 2023-10-18 DIAGNOSIS — R10.11 RIGHT UPPER QUADRANT ABDOMINAL PAIN: ICD-10-CM

## 2023-10-18 DIAGNOSIS — R05.2 SUBACUTE COUGH: Primary | ICD-10-CM

## 2023-10-18 PROCEDURE — 99213 OFFICE O/P EST LOW 20 MIN: CPT | Performed by: FAMILY MEDICINE

## 2023-10-18 PROCEDURE — 3044F HG A1C LEVEL LT 7.0%: CPT | Performed by: FAMILY MEDICINE

## 2023-10-18 RX ORDER — TRAMADOL HYDROCHLORIDE 50 MG/1
50 TABLET ORAL EVERY 6 HOURS PRN
Qty: 12 TABLET | Refills: 0 | Status: SHIPPED | OUTPATIENT
Start: 2023-10-18

## 2023-10-18 RX ORDER — DICYCLOMINE HYDROCHLORIDE 10 MG/1
10 CAPSULE ORAL
Qty: 40 CAPSULE | Refills: 1 | Status: SHIPPED | OUTPATIENT
Start: 2023-10-18

## 2023-10-18 RX ORDER — AZITHROMYCIN 250 MG/1
TABLET, FILM COATED ORAL
Qty: 6 TABLET | Refills: 0 | Status: SHIPPED | OUTPATIENT
Start: 2023-10-18

## 2023-10-18 NOTE — PROGRESS NOTES
Subjective   Renée Laurent is a 62 y.o. female.     Chief Complaint   Patient presents with    Cough    Abdominal Pain     You have chosen to receive care through a telehealth visit.  Do you consent to use a video/audio connection for your medical care today? Yes    This was an audio and video enabled telemedicine encounter.    Pt is home in [facility] and I am in my office in [facility]  Visit Vitals  LMP  (LMP Unknown) Comment: scheduled for mammo         Abdominal Pain  This is a chronic problem. The current episode started more than 1 year ago. The onset quality is gradual. The problem occurs daily. The most recent episode lasted 12 months. The problem has been gradually worsening (pain radiating to back). The pain is located in the suprapubic region and RUQ. The quality of the pain is burning, cramping and a sensation of fullness. The abdominal pain radiates to the periumbilical region, suprapubic region and back. Associated symptoms include anorexia, constipation, diarrhea, nausea and vomiting. Pertinent negatives include no arthralgias, belching, dysuria, fever, flatus, frequency, headaches, hematochezia, hematuria, melena, myalgias or weight loss. The pain is aggravated by eating, movement and vomiting. Prior diagnostic workup includes CT scan. Her past medical history is significant for abdominal surgery.   Cough  This is a new problem. The current episode started 1 to 4 weeks ago. The problem has been unchanged. The problem occurs every few minutes. The cough is Non-productive. Associated symptoms include ear pain, nasal congestion and shortness of breath. Pertinent negatives include no chest pain, chills, ear congestion, fever, headaches, heartburn, hemoptysis, myalgias, postnasal drip, rash, rhinorrhea, sore throat, sweats, weight loss or wheezing. The symptoms are aggravated by exercise and lying down. She has tried nothing for the symptoms. The treatment provided no relief.      Pt will  be going to [place] on Nov 4th.   Pt had an Ambien scrip filled. Pt requests some pain med for travel.   Pt had vomiting a month ago and has some aspiration.  Pt has cough and congestion. Pt is not bringing up any colored phlegm.     The following portions of the patient's history were reviewed and updated as appropriate: allergies, current medications, past family history, past medical history, past social history, past surgical history, and problem list.    Past Medical History:   Diagnosis Date    Abdominal pain     Absence of pancreas, acquired     Allergic     Anemia     Anxiety     Asthma     Basal cell carcinoma     Bile reflux gastritis     Chronic illness     Contact dermatitis     due to plants    Depression     Diabetes mellitus     Diabetes mellitus type I 2011    Type 3c - surgically induced    Eating disorder     Resolved    Encounter for dental examination 07/2014    Gastroparesis     GERD (gastroesophageal reflux disease)     History of medical problems 12/1/2011    Gastroparesis    History of MRSA infection     [facility] 2009 abdominal incisional infection    Hyperlipidemia     Hypoglycemia 2011    Incisional hernia     Insomnia     Iron deficiency     Kidney stone     Left kidney    Myeloid leukemia     Osteopenia     Osteoporosis 2015    Osteopenia    Pancreatitis     Pneumonia 1/1/2017    PONV (postoperative nausea and vomiting)     Spinal headache     Vitamin D deficiency     Wears glasses       Past Surgical History:   Procedure Laterality Date    ABDOMINAL SURGERY      x 10    CERVICAL DISCECTOMY ANTERIOR  1997    ACDF w/ fusion -Dr. Jerald Lopez    CHOLECYSTECTOMY      CHOLECYSTECTOMY      COLONOSCOPY      COSMETIC SURGERY      Rhinoplasty    GASTRIC STIMULATOR IMPLANT SURGERY  12/17/2018    [facility]    HERNIA MESH REMOVAL  2014    abdominal hernia with mesh     HYSTERECTOMY      OTHER SURGICAL HISTORY      duodeum removed    OTHER SURGICAL HISTORY      jejunem removed     PANCREATECTOMY      SMALL INTESTINE SURGERY  12/1/2011    Tp-ait    SPINAL CORD STIMULATOR IMPLANT N/A 5/10/2021    Procedure: SPINAL CORD STIMULATOR INSERTION;  Surgeon: Kedar Estevez MD;  [facility]: [facility];  Service: Neurosurgery;  Laterality: N/A;    TOTAL ABDOMINAL HYSTERECTOMY WITH SALPINGO OOPHORECTOMY      TUBAL ABDOMINAL LIGATION      UPPER GASTROINTESTINAL ENDOSCOPY  10/25/2017    Dr Mehta, gastritis, eosinphils in esophagus      Family History   Problem Relation Age of Onset    Osteoporosis Mother     Cancer Mother         CLL    Hashimoto's thyroiditis Mother     Arthritis Mother     Depression Mother     Thyroid disease Mother         Hashimoto’s    Lung cancer Father     Alcohol abuse Father     Cancer Father         Lung    Liver disease Father     Asthma Sister       Social History     Socioeconomic History    Marital status: Single   Tobacco Use    Smoking status: Never    Smokeless tobacco: Never   Vaping Use    Vaping Use: Never used   Substance and Sexual Activity    Alcohol use: No    Drug use: No    Sexual activity: Not Currently     Partners: Male     Birth control/protection: Post-menopausal      Allergies   Allergen Reactions    Aspirin Hives    Phenergan [Promethazine Hcl]      Anxiety/relessness    Reglan [Metoclopramide]      Involuntary movement       Review of Systems   Constitutional:  Positive for appetite change (markedly decreased). Negative for chills, fever and weight loss.   HENT:  Positive for ear pain. Negative for postnasal drip, rhinorrhea and sore throat.    Respiratory:  Positive for cough and shortness of breath. Negative for hemoptysis and wheezing.    Cardiovascular:  Negative for chest pain.   Gastrointestinal:  Positive for abdominal pain, anorexia, constipation, diarrhea, nausea and vomiting. Negative for flatus, heartburn, hematochezia and melena.   Genitourinary:  Negative for dysuria, frequency and hematuria.   Musculoskeletal:  Positive for back pain.  Negative for arthralgias and myalgias.   Skin:  Negative for rash.   Neurological:  Negative for headaches.       Objective   Physical Exam  Constitutional:       Comments: Video visit   HENT:      Head: Normocephalic and atraumatic.      Nose: Nose normal.   Eyes:      Extraocular Movements: Extraocular movements intact.   Musculoskeletal:      Cervical back: Normal range of motion.   Neurological:      Mental Status: She is alert and oriented to person, place, and time.   Psychiatric:         Mood and Affect: Mood normal.         Thought Content: Thought content normal.         Judgment: Judgment normal.         Assessment & Plan   Problems Addressed this Visit          Gastrointestinal Abdominal     Abdominal pain    Relevant Medications    dicyclomine (BENTYL) 10 MG capsule    traMADol (ULTRAM) 50 MG tablet     Other Visit Diagnoses       Subacute cough    -  Primary    Relevant Medications    azithromycin (Zithromax Z-Jorge Luis) 250 MG tablet          Diagnoses         Codes Comments    Subacute cough    -  Primary ICD-10-CM: R05.2  ICD-9-CM: 786.2     Right upper quadrant abdominal pain     ICD-10-CM: R10.11  ICD-9-CM: 789.01             Don't take the tramadol with the ambien. Pt agreed.   Pt advised if needing a longer scrip for pain meds will need to come in.            Current Outpatient Medications:     azithromycin (Zithromax Z-Jorge Luis) 250 MG tablet, Take 2 tablets the first day, then 1 tablet daily for 4 days., Disp: 6 tablet, Rfl: 0    busPIRone (BUSPAR) 10 MG tablet, Take 1 tablet by mouth 3 (Three) Times a Day., Disp: 180 tablet, Rfl: 1    Continuous Blood Gluc Sensor (Dexcom G6 Sensor), 1 each Take As Directed., Disp: 3 each, Rfl: 11    Continuous Blood Gluc Sensor (Dexcom G6 Sensor), 1 each Take As Directed., Disp: 9 each, Rfl: 3    Continuous Blood Gluc Transmit (Dexcom G6 Transmitter) misc, 1 each Take As Directed., Disp: 1 each, Rfl: 3    dicyclomine (BENTYL) 10 MG capsule, Take 1 capsule by mouth 4  (Four) Times a Day Before Meals & at Bedtime. For abdominal cramping, Disp: 40 capsule, Rfl: 1    glucagon (GLUCAGEN) 1 MG injection, Inject 1 mg into the appropriate muscle as directed by prescriber 1 (One) Time As Needed for Low Blood Sugar for up to 1 dose., Disp: 1 kit, Rfl: 11    glucose blood (Contour Next Test) test strip, TEST 4 TIMES DAILY dx:  e10.65, Disp: 150 each, Rfl: 11    Insulin Disposable Pump (Omnipod 5 G6 Pod, Gen 5,) misc, 1 each Daily., Disp: 10 each, Rfl: 5    Insulin Lispro (humaLOG) 100 UNIT/ML injection, Use daily per insulin pump, MDD 40 units, Disp: 40 mL, Rfl: 1    MAGNESIUM GLYCINATE PLUS PO, Take 1 tablet by mouth Daily., Disp: , Rfl:     Multiple Vitamins-Minerals (AQUADEKS PO), Take  by mouth., Disp: , Rfl:     ondansetron (ZOFRAN) 4 MG tablet, TAKE TWO TABLETS BY MOUTH EVERY 8 HOURS AS NEEDED FOR NAUSEA OR VOMITING, Disp: 60 tablet, Rfl: 3    Pancrelipase, Lip-Prot-Amyl, (CREON) 48151-051120 units capsule delayed-release particles capsule, Take 1 capsule by mouth 3 (Three) Times a Day With Meals., Disp: , Rfl:     pantoprazole (PROTONIX) 40 MG EC tablet, Take 1 tablet by mouth Daily., Disp: 90 tablet, Rfl: 1    Probiotic Product (PROBIOTIC DAILY PO), Take  by mouth., Disp: , Rfl:     sertraline (ZOLOFT) 25 MG tablet, Take 1 tablet by mouth Daily., Disp: 30 tablet, Rfl: 3    traMADol (ULTRAM) 50 MG tablet, Take 1 tablet by mouth Every 6 (Six) Hours As Needed for Moderate Pain., Disp: 12 tablet, Rfl: 0    Unable to find, Take 1 each by mouth Daily. Med Name: Calcium, Disp: , Rfl:     vitamin C (ASCORBIC ACID) 500 MG tablet, Take 500 mg by mouth Daily., Disp: , Rfl:     zolpidem (AMBIEN) 10 MG tablet, Take 1 tablet by mouth At Night As Needed for Sleep for up to 30 days., Disp: 30 tablet, Rfl: 0    Return if symptoms worsen or fail to improve, for Recheck.  Answers submitted by the patient for this visit:  Primary Reason for Visit (Submitted on 10/18/2023)  What is the primary reason  for your visit?: Abdominal Pain

## (undated) DEVICE — PENCL ROCKRSWCH MEGADYNE W/HOLSTR 10FT SS

## (undated) DEVICE — ELECTRD BLD EZ CLN MOD 4IN

## (undated) DEVICE — PROGRAMMER CONTRL INTELLIS NEUROSTM

## (undated) DEVICE — IRRIGATOR BULB ASEPTO 60CC STRL

## (undated) DEVICE — ANTIBACTERIAL UNDYED BRAIDED (POLYGLACTIN 910), SYNTHETIC ABSORBABLE SUTURE: Brand: COATED VICRYL

## (undated) DEVICE — CHRGR BATRY STIM INTELLIS NEUROSTM SYS

## (undated) DEVICE — ACCY PA700 LUBRICANT DIFFUSER MR7 4 PACK: Brand: MIDAS REX

## (undated) DEVICE — GLV SURG PREMIERPRO ORTHO LTX PF SZ7.5 BRN

## (undated) DEVICE — ADHS SKIN PREMIERPRO EXOFIN TOPICAL HI/VISC .5ML

## (undated) DEVICE — SUT VIC 3/0 PS2 27IN J427H

## (undated) DEVICE — BLANKT WARM UPPR/BDY ARM/OUT 57X196CM

## (undated) DEVICE — ELECTRD BLD EZ CLN STD 2.5IN

## (undated) DEVICE — TOOL 14MH30D LEGEND 14CM 3MM MH DIAM: Brand: MIDAS REX ™

## (undated) DEVICE — PATIENT RETURN ELECTRODE, SINGLE-USE, CONTACT QUALITY MONITORING, ADULT, WITH 9FT CORD, FOR PATIENTS WEIGING OVER 33LBS. (15KG): Brand: MEGADYNE

## (undated) DEVICE — CODMAN® DISPOSABLE CATHETER PASSER: Brand: CODMAN®

## (undated) DEVICE — DRSNG WND BORDR/ADHS NONADHR/GZ LF 4X4IN STRL

## (undated) DEVICE — CVR HNDL LIGHT RIGID

## (undated) DEVICE — SUT SILK 2/0 SH CR8 18IN CR8 C012D

## (undated) DEVICE — SUT VIC PLS CTD ANTIB BR 3/0 8/18IN 45CM

## (undated) DEVICE — PK NEURO DISC 10

## (undated) DEVICE — BRAIN SPATULA, 7 7/8", (200 MM), DOUBLE ENDED, MALLEABLE, WIDTH: 10 MM, SILICONE, STERILE, DISPOSABLE, PACKAGE OF 10 PIECES: Brand: AESCULAP

## (undated) DEVICE — STRAP POSTN KN/BDY FM 5X72IN DISP

## (undated) DEVICE — GLV SURG PREMIERPRO MIC LTX PF SZ7 BRN

## (undated) DEVICE — SNAP KOVER: Brand: UNBRANDED

## (undated) DEVICE — HDRST INTUB GENTLETOUCH SLOT 7IN RT

## (undated) DEVICE — GLV SURG PREMIERPRO MIC LTX PF SZ6.5 BRN

## (undated) DEVICE — APPL CHLORAPREP TINTED 26ML TEAL